# Patient Record
Sex: MALE | Race: WHITE | NOT HISPANIC OR LATINO | Employment: OTHER | URBAN - METROPOLITAN AREA
[De-identification: names, ages, dates, MRNs, and addresses within clinical notes are randomized per-mention and may not be internally consistent; named-entity substitution may affect disease eponyms.]

---

## 2017-06-27 ENCOUNTER — OFFICE VISIT (OUTPATIENT)
Dept: FAMILY MEDICINE | Facility: OTHER | Age: 82
End: 2017-06-27
Payer: COMMERCIAL

## 2017-06-27 ENCOUNTER — RADIANT APPOINTMENT (OUTPATIENT)
Dept: GENERAL RADIOLOGY | Facility: OTHER | Age: 82
End: 2017-06-27
Attending: PHYSICIAN ASSISTANT
Payer: COMMERCIAL

## 2017-06-27 VITALS
DIASTOLIC BLOOD PRESSURE: 86 MMHG | HEART RATE: 84 BPM | OXYGEN SATURATION: 99 % | BODY MASS INDEX: 26.49 KG/M2 | RESPIRATION RATE: 20 BRPM | TEMPERATURE: 98.2 F | HEIGHT: 67 IN | SYSTOLIC BLOOD PRESSURE: 148 MMHG | WEIGHT: 168.8 LBS

## 2017-06-27 DIAGNOSIS — M54.50 ACUTE BILATERAL LOW BACK PAIN WITHOUT SCIATICA: ICD-10-CM

## 2017-06-27 DIAGNOSIS — M54.50 ACUTE BILATERAL LOW BACK PAIN WITHOUT SCIATICA: Primary | ICD-10-CM

## 2017-06-27 PROCEDURE — 99214 OFFICE O/P EST MOD 30 MIN: CPT | Performed by: PHYSICIAN ASSISTANT

## 2017-06-27 PROCEDURE — 72100 X-RAY EXAM L-S SPINE 2/3 VWS: CPT

## 2017-06-27 RX ORDER — HYDROCODONE BITARTRATE AND ACETAMINOPHEN 5; 325 MG/1; MG/1
1-2 TABLET ORAL EVERY 8 HOURS PRN
Qty: 45 TABLET | Refills: 0 | Status: SHIPPED | OUTPATIENT
Start: 2017-06-27 | End: 2017-07-13

## 2017-06-27 RX ORDER — HYDROCODONE BITARTRATE AND ACETAMINOPHEN 5; 325 MG/1; MG/1
1-2 TABLET ORAL EVERY 8 HOURS PRN
Qty: 45 TABLET | Refills: 0 | Status: SHIPPED | OUTPATIENT
Start: 2017-06-27 | End: 2017-06-27

## 2017-06-27 ASSESSMENT — PAIN SCALES - GENERAL: PAINLEVEL: WORST PAIN (10)

## 2017-06-27 NOTE — NURSING NOTE
"No chief complaint on file.      Initial /86  Pulse 84  Temp 98.2  F (36.8  C) (Oral)  Resp 20  Ht 5' 6.93\" (1.7 m)  Wt 168 lb 12.8 oz (76.6 kg)  SpO2 99%  BMI 26.49 kg/m2 Estimated body mass index is 26.49 kg/(m^2) as calculated from the following:    Height as of this encounter: 5' 6.93\" (1.7 m).    Weight as of this encounter: 168 lb 12.8 oz (76.6 kg).  Medication Reconciliation: complete    "

## 2017-06-27 NOTE — MR AVS SNAPSHOT
After Visit Summary   6/27/2017    Luther Kam    MRN: [id number]           Patient Information     Date Of Birth          1935        Visit Information        Provider Department      6/27/2017 10:00 AM Sarah Hickey PA-C [facility]        Today's Diagnoses     Acute bilateral low back pain without sciatica    -  1      Care Instructions    - Pain medications - 2 per day (may take up to 4 per day)   - Can take over-the-counter Ibuprofen or Naproxen as needed for break through pain      Take Senna or Milk of Magnesia if gets constipated   - Make appointment with neurosurgery   - Daily stretching                      Acute Low Back Pain: Self Care at Home Instructions  Conservative Treatment    Remaining active supports a quicker recovery, keep moving. (ex. Walking, increase time & speed as tolerated).    Bed rest is not recommended. Minimize sitting. Do not remain in one position for extended periods of time.    Maintain routine activity with attention to correct posture; stop aggravating activities.    Over-the-counter pain medications for short term symptom control. (See below)    Muscle relaxants are sometimes helpful for few days, but may cause drowsiness. (See below)    Cold packs or heat based upon your preference.    Most people improve within 2 weeks; most have significant improvement within 4 weeks.    If symptoms worsen or you experience numbness, contact your provider immediately.    Medications for Pain Relief  Recommended over-the-counter non-steroidal anti-inflammatories:     Ibuprofen (Advil, Motrin) 600 mg. three times a day as needed for pain      OR   Naproxen Sodium (Aleve) 220-440 mg. two times per day as needed for pain      Low Back Pain Exercises   Exercises that stretch and strengthen the muscles of your abdomen and spine can help prevent back problems. If your back and abdominal muscles are strong, you can maintain good posture and keep  your spine in its correct position.     If your muscles are tight, take a warm shower or bath before doing the exercises. Exercise on a rug or mat. Stop doing any exercise that causes pain until you have talked with your provider.     These exercises are intended only as suggestions. Ask your provider or physical therapist to help you develop an exercise program. Check with your provider before starting the exercises. Ask your provider how many times a week you need to do the exercises.     Low Back Pain Exercises                                            Cat and camel: Get down on your hands and knees. Let your stomach sag, allowing your back to curve downward. Hold this position for 5 seconds. Then arch your back and hold for 5 seconds. Do 3 sets of 10.       Pelvic tilt: Lie on your back with your knees bent and your feet flat on the floor. Tighten your abdominal muscles and push your lower back into the floor. Hold this position for 5 seconds, then relax. Do 3 sets of 10.       Extension exercise: Lie face down on the floor for 5 minutes. If this hurts too much, lie face down with a pillow under your stomach. This should relieve your leg or back pain. When you can lie on your stomach for 5 minutes without a pillow, then you can continue with the rest of this exercise.     After lying on your stomach for 5 minutes, prop yourself up on your elbows for another 5 minutes. Lie flat again for 1 minute, then press down on your hands and extend your elbows while keeping your hips flat on the floor. Hold for 1 second and lower yourself to the floor. Repeat 10 times. Do 4 sets. Rest for 2 minutes between sets. You should have no pain in your legs when you do this, but it is normal to feel pain in your lower back. Do this several times a day.          Developed by [employer]   Published by [employer].   Last modified: 2009-02-08   Last reviewed: 2008-07-07   This content is reviewed periodically and is subject to change  as new health information becomes available. The information is intended to inform and educate and is not a replacement for medical evaluation, advice, diagnosis or treatment by a healthcare professional.   Adult Health Advisor 2009.1 Index  Adult Health Advisor 2009.1 Credits     2009 [facility] and/or its affiliates. All Rights Reserved.                  Low Back Pain            What is low back pain?   Low back pain is pain and stiffness in the lower back. It is one of the most common reasons people miss work.   How does it occur?   Your lower back is called your lumbar spine. It is made up of 5 bones called lumbar vertebrae. In between the vertebrae are shock absorbers called disks. Back pain can occur from an injury to the vertebrae or when a disk bulges or herniates.   Low back pain is usually caused when a ligament or muscle holding a vertebra in its proper position is strained. Vertebrae are bones that make up the spinal column through which the spinal cord passes. When these muscles or ligaments become weak or strained, the spine loses its stability, resulting in pain.   Low back pain can occur if your job involves lifting and carrying heavy objects, or if you spend a lot of time sitting or standing in one position or bending over. It can be caused by a fall or by unusually strenuous exercise. It can be brought on by the tension and stress that cause headaches in some people. It can even be brought on by violent sneezing or coughing.   People who are overweight may have low back pain because of the added stress on their back.   Back pain may occur when the muscles, joints, bones, and connective tissues of the back become inflamed as a result of an infection or an immune system problem. Arthritic disorders as well as some congenital and degenerative conditions may cause back pain.   Back pain accompanied by loss of bladder or bowel control, trouble moving your legs, or numbness or tingling in your arms or  legs requires immediate medical treatment.   What are the symptoms?   Symptoms include:   pain in the back or legs   stiffness, spasm, or limited motion   The pain may be constant or may happen only in certain positions. It may get worse when you cough, sneeze, bend, twist, or strain during a bowel movement. The pain may be in only one spot or may spread to other areas, most commonly down the buttocks and into the back of the thigh.   A low back strain typically does not produce pain past the knee into the calf or foot. Tingling or numbness in the calf or foot may indicate a herniated disk or pinched nerve.   Be sure to see your healthcare provider if:   You have weakness in your leg, especially if you cannot lift your foot, because this may be a sign of nerve damage.   You have new bowel or bladder problems as well as back pain, which may be a sign of severe injury to your spinal cord.   You have pain that gets worse despite treatment.   How is it diagnosed?   Your healthcare provider will review your medical history and examine you. You may have X-rays, an MRI, CT scan, or a bone scan.   How is it treated?   To treat this condition:   Put an ice pack, gel pack, or package of frozen vegetables, wrapped in a cloth on the area every 3 to 4 hours, for up to 20 minutes at a time for the first 2 or 3 days.   Use a heating pad or hot water bottle. Don't let the heating pad get too hot, and don't fall asleep with it. You could get a burn.   Rest in bed on a firm mattress. Often it helps to lie on your back with your knees raised on a pillow. However, some people prefer to lie on their side with their knees bent. It's best to try to stay active, so try not to rest in bed longer than 1 to 2 days.   Take muscle relaxants as recommended by your healthcare provider.   Take an anti-inflammatory such as ibuprofen, or other medicine as directed by your provider. Nonsteroidal anti-inflammatory medicines (NSAIDs) may cause stomach  bleeding and other problems. These risks increase with age. Read the label and take as directed. Unless recommended by your healthcare provider, do not take for more than 10 days.   Get a back massage by a trained person.   Wear a belt or corset to support your back.   Do the exercises recommended by your provider. Your provider may also prescribe physical therapy.   Talk with a counselor, if your back pain is related to tension caused by emotional problems.   When the pain is gone, ask your healthcare provider about starting an exercise program such as the following:   Exercise moderately every day, using stretching and warm-up exercises suggested by your provider or physical therapist.   Exercise vigorously for about 30 minutes 3 times a week by walking, swimming, using a stationary bicycle, or doing low-impact aerobics.   Exercising regularly will not only help your back, it will also help keep you healthier overall.   How long will the effects last?   The effects of back pain last as long as the cause exists or until your body recovers from the strain, usually a day or two but sometimes weeks.   How can I take care of myself?   In addition to the treatment described above, keep in mind these suggestions:   Practice good posture. Stand with your head up, shoulders straight, chest forward, weight balanced evenly on both feet, and pelvis tucked in.   Lose weight if you are overweight   Keep your core muscles strong. These are your abdominal and back muscles.   Sleep without a pillow under your head.   Pain is the best way to  the pace you should set in increasing your activity and exercise. Minor discomfort, stiffness, soreness, and mild aches need not interfere with activity. However, limit your activities temporarily if:   Your symptoms return.   The pain increases when you are more active.   The pain increases within 24 hours after a new or higher level of activity.   When can I return to my normal  activities?   Everyone recovers from an injury at a different rate. Return to your activities depends on how soon your back recovers, not by how many days or weeks it has been since your injury has occurred. In general, the longer you have symptoms before you start treatment, the longer it will take to get better. The goal is to return to your normal activities as soon as is safely possible. If you return too soon you may worsen your injury.   It is important that you have fully recovered from your low back pain before you return to any strenuous activity. You must be able to have the same range of motion that you had before your injury. You must be able to walk and twist without pain.   What can I do to help prevent low back pain?   You can reduce the strain on your back by doing the following:   Don't push with your arms when you move a heavy object. Turn around and push backwards so the strain is taken by your legs.   Whenever you sit, sit in a straight-backed chair and hold your spine against the back of the chair.   Bend your knees and hips and keep your back straight when you lift a heavy object.   Avoid lifting heavy objects higher than your waist.   Hold packages you carry close to your body, with your arms bent.   Use a footrest for one foot when you stand or sit in one spot for a long time. This keeps your back straight.   Bend your knees when you bend over.   Sit close to the pedals when you drive and use your seat belt and a hard backrest or pillow.   Lie on your side with your knees bent when you sleep or rest. It may help to put a pillow between your knees.   Put a pillow under your knees when you sleep on your back.   Raise the foot of the bed 8 inches to discourage sleeping on your stomach unless you have other problems that require that you keep your head elevated.   To rest your back, hold each of these positions for 5?minutes or longer:   Lie on your back, bend your knees, and put pillows under  your knees.   Lie on your back on the floor with a pillow under your neck. Bend your knees to a 90-degree angle, and put your lower legs and feet on a chair.   Lie on your back, bend your knees, and bring one knee up to your chest and hold it there. Repeat with the other knee, then bring both knees to your chest. When holding your knee to your chest, grab your thigh rather than your lower leg to avoid over flexing your knee.     Published by [employer].  This content is reviewed periodically and is subject to change as new health information becomes available. The information is intended to inform and educate and is not a replacement for medical evaluation, advice, diagnosis or treatment by a healthcare professional.   Developed by Nathalie Marx RN, [place], and [facility].   ? 2010 [facility] and/or its affiliates. All Rights Reserved.           Low Back Pain Exercise          Standing hamstring stretch: Put the heel of one leg on a stool about 15 inches high. Keep your leg straight. Lean forward, bending at the hips until you feel a mild stretch in the back of your thigh. Make sure you do not roll your shoulders or bend at the waist when doing this. You want to stretch your leg, not your lower back. Hold the stretch for 15 to 30 seconds. Repeat with each leg 3 times.   Cat and camel: Get down on your hands and knees. Let your stomach sag, allowing your back to curve downward. Hold this position for 5 seconds. Then arch your back and hold for 5 seconds. Do 3 sets of 10.   Quadruped arm and leg raise: Get down on your hands and knees. Pull in your belly button and tighten your abdominal muscles to stiffen your spine. While keeping your abdominals tight, raise one arm and the opposite leg away from you. Hold this position for 5 seconds. Lower your arm and leg slowly and change sides. Do this 10 times on each side.   Pelvic tilt: Lie on your back with your knees bent and your feet flat on the floor. Tighten your  abdominal muscles and push your lower back into the floor. Hold this position for 5 seconds, then relax. Do 3 sets of 10.   Partial curl: Lie on your back with your knees bent and your feet flat on the floor. Tighten your stomach muscles. Tuck your chin to your chest. With your hands stretched out in front of you, curl your upper body forward until your shoulders clear the floor. Hold this position for 3 seconds. Don't hold your breath. It helps to breathe out as you lift your shoulders up. Relax back to the floor. Repeat 10 times. Build to 3 sets of 10. To challenge yourself, clasp your hands behind your head and keep your elbows out to the side.   Gluteal stretch: Lie on your back with both knees bent. Rest the ankle of one leg over the knee of your other leg. Grasp the thigh of the bottom leg and pull toward your chest. You will feel a stretch along the buttocks and possibly along the outside of your hip. Hold the stretch for 15 to 30 seconds. Repeat 3 times with each leg.   Extension exercise:   0. Lie face down on the floor for 5 minutes. If this hurts too much, lie face down with a pillow under your stomach. This should relieve your leg or back pain. When you can lie on your stomach for 5 minutes without a pillow, you can continue with Part B of this exercise.   0. After lying on your stomach for 5 minutes, prop yourself up on your elbows for another 5 minutes. If you can do this without having more leg or buttock pain, you can start doing part C of this exercise.   0. Lie on your stomach with your hands under your shoulders. Then press down on your hands and extend your elbows while keeping your hips flat on the floor. Hold for 1 second and lower yourself to the floor. Do 3 to 5 sets of 10 repetitions. Rest for 1 minute between sets. You should have no pain in your legs when you do this, but it is normal to feel some pain in your lower back.   Do this exercise several times a day.   Side plank: Lie on your  side with your legs, hips, and shoulders in a straight line. Prop yourself up onto your forearm so your elbow is directly under your shoulder. Lift your hips off the floor and balance on your forearm and the outside of your foot. Try to hold this position for 15 seconds, then slowly lower your hip to the ground. Switch sides and repeat. Work up to holding for 1 minute or longer. This exercise can be made easier by starting with your knees and hips flexed toward your chest.   Published by [employer].  This content is reviewed periodically and is subject to change as new health information becomes available. The information is intended to inform and educate and is not a replacement for medical evaluation, advice, diagnosis or treatment by a healthcare professional.   Written by Makayla Ybarra MS, PT, and Nathalie Joshua PT, [facility], [facility] [place], for [facility]   ? 2010 [facility] and/or its affiliates. All Rights Reserved.         Copyright   [employer] Systems 2011                      Follow-ups after your visit        Additional Services     NEUROSURGERY REFERRAL       Your provider has referred you to: PREFERRED PROVIDERS:  [facility]: [facility] [phone]   [email or web address]    Please be aware that coverage of these services is subject to the terms and limitations of your health insurance plan.  Call member services at your health plan with any benefit or coverage questions.      Please bring the following with you to your appointment:    (1) Any X-Rays, CTs or MRIs which have been performed.  Contact the facility where they were done to arrange for  prior to your scheduled appointment.   (2) List of current medications  (3) This referral request   (4) Any documents/labs given to you for this referral                  Who to contact     If you have questions or need follow up information about today's clinic visit or your schedule please contact  "[facility] directly at [phone].  Normal or non-critical lab and imaging results will be communicated to you by MyChart, letter or phone within 4 business days after the clinic has received the results. If you do not hear from us within 7 days, please contact the clinic through MyChart or phone. If you have a critical or abnormal lab result, we will notify you by phone as soon as possible.  Submit refill requests through [employer] or call your pharmacy and they will forward the refill request to us. Please allow 3 business days for your refill to be completed.          Additional Information About Your Visit        [employer] Translate Information     [employer] lets you send messages to your doctor, view your test results, renew your prescriptions, schedule appointments and more. To sign up, go to [email or web address] . Click on \"Log in\" on the left side of the screen, which will take you to the Welcome page. Then click on \"Sign up Now\" on the right side of the page.     You will be asked to enter the access code listed below, as well as some personal information. Please follow the directions to create your username and password.     Your access code is: [email or web address]  Expires: 2017 10:51 AM     Your access code will  in 90 days. If you need help or a new code, please call your [facility] clinic or [phone].        Care EveryWhere ID     This is your Care EveryWhere ID. This could be used by other organizations to access your [facility] medical records  [id number]        Your Vitals Were     Pulse Temperature Respirations Height Pulse Oximetry BMI (Body Mass Index)    84 98.2  F (36.8  C) (Oral) 20 5' 6.93\" (1.7 m) 99% 26.49 kg/m2       Blood Pressure from Last 3 Encounters:   17 148/86   16 118/68   16 130/80    Weight from Last 3 Encounters:   17 168 lb 12.8 oz (76.6 kg)   16 172 lb 12.8 oz (78.4 kg)   16 175 lb (79.4 kg)              We Performed the Following "     NEUROSURGERY REFERRAL          Today's Medication Changes          These changes are accurate as of: 6/27/17 11:59 PM.  If you have any questions, ask your nurse or doctor.               Start taking these medicines.        Dose/Directions    HYDROcodone-acetaminophen 5-325 MG per tablet   Commonly known as:  NORCO   Used for:  Acute bilateral low back pain without sciatica   Started by:  Sarah Hickey PA-C        Dose:  1-2 tablet   Take 1-2 tablets by mouth every 8 hours as needed for moderate to severe pain (Max 4 per day)   Quantity:  45 tablet   Refills:  0            Where to get your medicines      Some of these will need a paper prescription and others can be bought over the counter.  Ask your nurse if you have questions.     Bring a paper prescription for each of these medications     HYDROcodone-acetaminophen 5-325 MG per tablet                Primary Care Provider Office Phone # Fax #    Osmar Kelby Natarajan PA-C [phone]       [address]  [facility] [address]        Equal Access to Services     DANII LEE : Hadii david ku hadasho Soomaali, waaxda luqadaha, qaybta kaalmada adeegyada, waxay maurilioin hayvalerie freeman . So [facility] [phone].    ATENCIÓN: Si habla español, tiene a coyne disposición servicios gratuitos de asistencia lingüística. [facility] [phone].    We comply with applicable federal civil rights laws and [place] laws. We do not discriminate on the basis of race, color, national origin, age, disability sex, sexual orientation or gender identity.            Thank you!     Thank you for choosing [facility]  for your care. Our goal is always to provide you with excellent care. Hearing back from our patients is one way we can continue to improve our services. Please take a few minutes to complete the written survey that you may receive in the mail after your visit with us. Thank you!              Your Updated Medication List - Protect others around you: Learn how to safely use, store and throw away your medicines at www.disposemymeds.org.          This list is accurate as of: 6/27/17 11:59 PM.  Always use your most recent med list.                   Brand Name Dispense Instructions for use Diagnosis    HYDROcodone-acetaminophen 5-325 MG per tablet    NORCO    45 tablet    Take 1-2 tablets by mouth every 8 hours as needed for moderate to severe pain (Max 4 per day)    Acute bilateral low back pain without sciatica       metroNIDAZOLE 0.75 % topical gel    METROGEL     Apply topically twice per day as needed

## 2017-06-27 NOTE — PATIENT INSTRUCTIONS
- Pain medications - 2 per day (may take up to 4 per day)   - Can take over-the-counter Ibuprofen or Naproxen as needed for break through pain      Take Senna or Milk of Magnesia if gets constipated   - Make appointment with neurosurgery   - Daily stretching                      Acute Low Back Pain: Self Care at Home Instructions  Conservative Treatment    Remaining active supports a quicker recovery, keep moving. (ex. Walking, increase time & speed as tolerated).    Bed rest is not recommended. Minimize sitting. Do not remain in one position for extended periods of time.    Maintain routine activity with attention to correct posture; stop aggravating activities.    Over-the-counter pain medications for short term symptom control. (See below)    Muscle relaxants are sometimes helpful for few days, but may cause drowsiness. (See below)    Cold packs or heat based upon your preference.    Most people improve within 2 weeks; most have significant improvement within 4 weeks.    If symptoms worsen or you experience numbness, contact your provider immediately.    Medications for Pain Relief  Recommended over-the-counter non-steroidal anti-inflammatories:     Ibuprofen (Advil, Motrin) 600 mg. three times a day as needed for pain      OR   Naproxen Sodium (Aleve) 220-440 mg. two times per day as needed for pain      Low Back Pain Exercises   Exercises that stretch and strengthen the muscles of your abdomen and spine can help prevent back problems. If your back and abdominal muscles are strong, you can maintain good posture and keep your spine in its correct position.     If your muscles are tight, take a warm shower or bath before doing the exercises. Exercise on a rug or mat. Stop doing any exercise that causes pain until you have talked with your provider.     These exercises are intended only as suggestions. Ask your provider or physical therapist to help you develop an exercise program. Check with your provider before  starting the exercises. Ask your provider how many times a week you need to do the exercises.     Low Back Pain Exercises                                            Cat and camel: Get down on your hands and knees. Let your stomach sag, allowing your back to curve downward. Hold this position for 5 seconds. Then arch your back and hold for 5 seconds. Do 3 sets of 10.       Pelvic tilt: Lie on your back with your knees bent and your feet flat on the floor. Tighten your abdominal muscles and push your lower back into the floor. Hold this position for 5 seconds, then relax. Do 3 sets of 10.       Extension exercise: Lie face down on the floor for 5 minutes. If this hurts too much, lie face down with a pillow under your stomach. This should relieve your leg or back pain. When you can lie on your stomach for 5 minutes without a pillow, then you can continue with the rest of this exercise.     After lying on your stomach for 5 minutes, prop yourself up on your elbows for another 5 minutes. Lie flat again for 1 minute, then press down on your hands and extend your elbows while keeping your hips flat on the floor. Hold for 1 second and lower yourself to the floor. Repeat 10 times. Do 4 sets. Rest for 2 minutes between sets. You should have no pain in your legs when you do this, but it is normal to feel pain in your lower back. Do this several times a day.          Developed by [employer]   Published by [employer].   Last modified: 2009-02-08   Last reviewed: 2008-07-07   This content is reviewed periodically and is subject to change as new health information becomes available. The information is intended to inform and educate and is not a replacement for medical evaluation, advice, diagnosis or treatment by a healthcare professional.   Adult Health Advisor 2009.1 Index  Adult Health Advisor 2009.1 Credits     2009 [facility] and/or its affiliates. All Rights Reserved.                  Low Back Pain            What is low  back pain?   Low back pain is pain and stiffness in the lower back. It is one of the most common reasons people miss work.   How does it occur?   Your lower back is called your lumbar spine. It is made up of 5 bones called lumbar vertebrae. In between the vertebrae are shock absorbers called disks. Back pain can occur from an injury to the vertebrae or when a disk bulges or herniates.   Low back pain is usually caused when a ligament or muscle holding a vertebra in its proper position is strained. Vertebrae are bones that make up the spinal column through which the spinal cord passes. When these muscles or ligaments become weak or strained, the spine loses its stability, resulting in pain.   Low back pain can occur if your job involves lifting and carrying heavy objects, or if you spend a lot of time sitting or standing in one position or bending over. It can be caused by a fall or by unusually strenuous exercise. It can be brought on by the tension and stress that cause headaches in some people. It can even be brought on by violent sneezing or coughing.   People who are overweight may have low back pain because of the added stress on their back.   Back pain may occur when the muscles, joints, bones, and connective tissues of the back become inflamed as a result of an infection or an immune system problem. Arthritic disorders as well as some congenital and degenerative conditions may cause back pain.   Back pain accompanied by loss of bladder or bowel control, trouble moving your legs, or numbness or tingling in your arms or legs requires immediate medical treatment.   What are the symptoms?   Symptoms include:   pain in the back or legs   stiffness, spasm, or limited motion   The pain may be constant or may happen only in certain positions. It may get worse when you cough, sneeze, bend, twist, or strain during a bowel movement. The pain may be in only one spot or may spread to other areas, most commonly down the  buttocks and into the back of the thigh.   A low back strain typically does not produce pain past the knee into the calf or foot. Tingling or numbness in the calf or foot may indicate a herniated disk or pinched nerve.   Be sure to see your healthcare provider if:   You have weakness in your leg, especially if you cannot lift your foot, because this may be a sign of nerve damage.   You have new bowel or bladder problems as well as back pain, which may be a sign of severe injury to your spinal cord.   You have pain that gets worse despite treatment.   How is it diagnosed?   Your healthcare provider will review your medical history and examine you. You may have X-rays, an MRI, CT scan, or a bone scan.   How is it treated?   To treat this condition:   Put an ice pack, gel pack, or package of frozen vegetables, wrapped in a cloth on the area every 3 to 4 hours, for up to 20 minutes at a time for the first 2 or 3 days.   Use a heating pad or hot water bottle. Don't let the heating pad get too hot, and don't fall asleep with it. You could get a burn.   Rest in bed on a firm mattress. Often it helps to lie on your back with your knees raised on a pillow. However, some people prefer to lie on their side with their knees bent. It's best to try to stay active, so try not to rest in bed longer than 1 to 2 days.   Take muscle relaxants as recommended by your healthcare provider.   Take an anti-inflammatory such as ibuprofen, or other medicine as directed by your provider. Nonsteroidal anti-inflammatory medicines (NSAIDs) may cause stomach bleeding and other problems. These risks increase with age. Read the label and take as directed. Unless recommended by your healthcare provider, do not take for more than 10 days.   Get a back massage by a trained person.   Wear a belt or corset to support your back.   Do the exercises recommended by your provider. Your provider may also prescribe physical therapy.   Talk with a counselor, if  your back pain is related to tension caused by emotional problems.   When the pain is gone, ask your healthcare provider about starting an exercise program such as the following:   Exercise moderately every day, using stretching and warm-up exercises suggested by your provider or physical therapist.   Exercise vigorously for about 30 minutes 3 times a week by walking, swimming, using a stationary bicycle, or doing low-impact aerobics.   Exercising regularly will not only help your back, it will also help keep you healthier overall.   How long will the effects last?   The effects of back pain last as long as the cause exists or until your body recovers from the strain, usually a day or two but sometimes weeks.   How can I take care of myself?   In addition to the treatment described above, keep in mind these suggestions:   Practice good posture. Stand with your head up, shoulders straight, chest forward, weight balanced evenly on both feet, and pelvis tucked in.   Lose weight if you are overweight   Keep your core muscles strong. These are your abdominal and back muscles.   Sleep without a pillow under your head.   Pain is the best way to  the pace you should set in increasing your activity and exercise. Minor discomfort, stiffness, soreness, and mild aches need not interfere with activity. However, limit your activities temporarily if:   Your symptoms return.   The pain increases when you are more active.   The pain increases within 24 hours after a new or higher level of activity.   When can I return to my normal activities?   Everyone recovers from an injury at a different rate. Return to your activities depends on how soon your back recovers, not by how many days or weeks it has been since your injury has occurred. In general, the longer you have symptoms before you start treatment, the longer it will take to get better. The goal is to return to your normal activities as soon as is safely possible. If you  return too soon you may worsen your injury.   It is important that you have fully recovered from your low back pain before you return to any strenuous activity. You must be able to have the same range of motion that you had before your injury. You must be able to walk and twist without pain.   What can I do to help prevent low back pain?   You can reduce the strain on your back by doing the following:   Don't push with your arms when you move a heavy object. Turn around and push backwards so the strain is taken by your legs.   Whenever you sit, sit in a straight-backed chair and hold your spine against the back of the chair.   Bend your knees and hips and keep your back straight when you lift a heavy object.   Avoid lifting heavy objects higher than your waist.   Hold packages you carry close to your body, with your arms bent.   Use a footrest for one foot when you stand or sit in one spot for a long time. This keeps your back straight.   Bend your knees when you bend over.   Sit close to the pedals when you drive and use your seat belt and a hard backrest or pillow.   Lie on your side with your knees bent when you sleep or rest. It may help to put a pillow between your knees.   Put a pillow under your knees when you sleep on your back.   Raise the foot of the bed 8 inches to discourage sleeping on your stomach unless you have other problems that require that you keep your head elevated.   To rest your back, hold each of these positions for 5?minutes or longer:   Lie on your back, bend your knees, and put pillows under your knees.   Lie on your back on the floor with a pillow under your neck. Bend your knees to a 90-degree angle, and put your lower legs and feet on a chair.   Lie on your back, bend your knees, and bring one knee up to your chest and hold it there. Repeat with the other knee, then bring both knees to your chest. When holding your knee to your chest, grab your thigh rather than your lower leg to avoid  over flexing your knee.     Published by [employer].  This content is reviewed periodically and is subject to change as new health information becomes available. The information is intended to inform and educate and is not a replacement for medical evaluation, advice, diagnosis or treatment by a healthcare professional.   Developed by Nathalie Marx RN, [place], and [facility].   ? 2010 [facility] and/or its affiliates. All Rights Reserved.           Low Back Pain Exercise          Standing hamstring stretch: Put the heel of one leg on a stool about 15 inches high. Keep your leg straight. Lean forward, bending at the hips until you feel a mild stretch in the back of your thigh. Make sure you do not roll your shoulders or bend at the waist when doing this. You want to stretch your leg, not your lower back. Hold the stretch for 15 to 30 seconds. Repeat with each leg 3 times.   Cat and camel: Get down on your hands and knees. Let your stomach sag, allowing your back to curve downward. Hold this position for 5 seconds. Then arch your back and hold for 5 seconds. Do 3 sets of 10.   Quadruped arm and leg raise: Get down on your hands and knees. Pull in your belly button and tighten your abdominal muscles to stiffen your spine. While keeping your abdominals tight, raise one arm and the opposite leg away from you. Hold this position for 5 seconds. Lower your arm and leg slowly and change sides. Do this 10 times on each side.   Pelvic tilt: Lie on your back with your knees bent and your feet flat on the floor. Tighten your abdominal muscles and push your lower back into the floor. Hold this position for 5 seconds, then relax. Do 3 sets of 10.   Partial curl: Lie on your back with your knees bent and your feet flat on the floor. Tighten your stomach muscles. Tuck your chin to your chest. With your hands stretched out in front of you, curl your upper body forward until your shoulders clear the floor. Hold this position for 3  seconds. Don't hold your breath. It helps to breathe out as you lift your shoulders up. Relax back to the floor. Repeat 10 times. Build to 3 sets of 10. To challenge yourself, clasp your hands behind your head and keep your elbows out to the side.   Gluteal stretch: Lie on your back with both knees bent. Rest the ankle of one leg over the knee of your other leg. Grasp the thigh of the bottom leg and pull toward your chest. You will feel a stretch along the buttocks and possibly along the outside of your hip. Hold the stretch for 15 to 30 seconds. Repeat 3 times with each leg.   Extension exercise:   0. Lie face down on the floor for 5 minutes. If this hurts too much, lie face down with a pillow under your stomach. This should relieve your leg or back pain. When you can lie on your stomach for 5 minutes without a pillow, you can continue with Part B of this exercise.   0. After lying on your stomach for 5 minutes, prop yourself up on your elbows for another 5 minutes. If you can do this without having more leg or buttock pain, you can start doing part C of this exercise.   0. Lie on your stomach with your hands under your shoulders. Then press down on your hands and extend your elbows while keeping your hips flat on the floor. Hold for 1 second and lower yourself to the floor. Do 3 to 5 sets of 10 repetitions. Rest for 1 minute between sets. You should have no pain in your legs when you do this, but it is normal to feel some pain in your lower back.   Do this exercise several times a day.   Side plank: Lie on your side with your legs, hips, and shoulders in a straight line. Prop yourself up onto your forearm so your elbow is directly under your shoulder. Lift your hips off the floor and balance on your forearm and the outside of your foot. Try to hold this position for 15 seconds, then slowly lower your hip to the ground. Switch sides and repeat. Work up to holding for 1 minute or longer. This exercise can be made  easier by starting with your knees and hips flexed toward your chest.   Published by [employer].  This content is reviewed periodically and is subject to change as new health information becomes available. The information is intended to inform and educate and is not a replacement for medical evaluation, advice, diagnosis or treatment by a healthcare professional.   Written by Makayla Ybarra, MS, PT, and Nathalie Joshua PT, [facility], [facility], for [facility]   ? 2010 [facility] and/or its affiliates. All Rights Reserved.         Copyright   [employer] 2011

## 2017-06-27 NOTE — PROGRESS NOTES
SUBJECTIVE:                                                    Luther Kam is a 82 year old male who presents to clinic today for the following health issues:    Back Pain       Duration: June         Specific cause: none    Description:   Location of pain: low back both  Character of pain: sharp  Pain radiation:none  New numbness or weakness in legs, not attributed to pain:  no     Intensity: Currently 6/10, At its worst 10/10    History:   Pain interferes with job: Not applicable  History of back problems: Yes 1980 was a work comp case   Any previous MRI or X-rays: MRI but unsure where it was, pt was told he had a herniated disc    Sees a specialist for back pain:  Yes unsure   Therapies tried without relief: none     Alleviating factors:   Improved by: none       Precipitating factors:  Worsened by: Lifting, Bending and twisting, leaning     Functional and Psychosocial Screen (Aung STarT Back):      Not performed today      Accompanying Signs & Symptoms:  Risk of Fracture:  Age >64  Risk of Cauda Equina:  None  Risk of Infection:  None  Risk of Cancer:  None  Risk of Ankylosing Spondylitis:  Onset at age <35, male, AND morning back stiffness. no     - PT states when he is in a lot of pain when trying to get out of bed, 10/10 almost can't do it   - History of back problems in the 80's and 90's   - Told back then herniated disc   - Got some pain pills in the ED   - Only took 1 per day, would take at noon, did really help just didn't last long       Problem list and histories reviewed & adjusted, as indicated.  Additional history: as documented    Labs reviewed in EPIC    Reviewed and updated as needed this visit by clinical staff  Tobacco  Allergies  Meds  Problems  Med Hx  Surg Hx  Fam Hx  Soc Hx        Reviewed and updated as needed this visit by Provider  Allergies  Meds  Problems         ROS:  Constitutional, HEENT, cardiovascular, pulmonary, gi and gu systems are negative, except as otherwise  "noted.    OBJECTIVE:   /86  Pulse 84  Temp 98.2  F (36.8  C) (Oral)  Resp 20  Ht 5' 6.93\" (1.7 m)  Wt 168 lb 12.8 oz (76.6 kg)  SpO2 99%  BMI 26.49 kg/m2  Body mass index is 26.49 kg/(m^2).  GENERAL APPEARANCE: healthy, alert and no distress  EYES: Eyes grossly normal to inspection, PERRLA, conjunctivae and sclerae without injection or discharge, EOM intact   MS: No musculoskeletal defects are noted and gait is age appropriate without ataxia   SKIN: No suspicious lesions or rashes, hydration status appears adeuqate with normal skin turgor   NEURO: Strength 5+ bilateral lower extremities, sensation intact in distal bilateral lower extremities, mentation- intact, speech- normal, reflexes- symmetric in bilateral lower extremities  BACK: No CVA tenderness, bilateral upper paralumbar tenderness, upper lumbar midline tenderness, decreased ROM with all movements    PSYCH: Alert and oriented x3; speech- coherent , normal rate and volume; able to articulate logical thoughts, able to abstract reason, no tangential thoughts, no hallucinations or delusions, mentation appears normal, Mood is euthymic. Affect is appropriate for this mood state and bright. Thought content is free of suicidal ideation, hallucinations, and delusions. Dress is adequate and upkept. Eye contact is good during conversation.       Diagnostic Test Results:  XR lumbar spine: Preliminary reading - normal bone structure with no evidence of fracture. Final pending review by radiology.       ASSESSMENT/PLAN:       ICD-10-CM    1. Acute bilateral low back pain without sciatica M54.5 XR Lumbar Spine 2/3 Views     NEUROSURGERY REFERRAL     HYDROcodone-acetaminophen (NORCO) 5-325 MG per tablet     - Concern for pathology due to pain   - Recommend more aggressive evaluation and treatment than conservative therapy   - Pain medications - 2 per day (may take up to 4 per day)      Discussed use and side effects including sedation and addiction      Watch " for sedation and falls that could happen if too sedated   - Can take over-the-counter Ibuprofen or Naproxen as needed for break through pain      Take Senna or Milk of Magnesia if gets constipated, watch for this carefully   - Make appointment with neurosurgery   - Daily stretching     Hand out given    The patient indicates understanding of these issues and agrees with the plan.    Follow up: with specialist       Sarah Hickey PA-C  [facility]

## 2017-06-28 ENCOUNTER — TELEPHONE (OUTPATIENT)
Dept: FAMILY MEDICINE | Facility: OTHER | Age: 82
End: 2017-06-28

## 2017-06-28 NOTE — TELEPHONE ENCOUNTER
Patient stopped by with his After visit summary, he noticed it said something about his wrist on there and he never talked about that.( it was under his diagnosis) Can you please take that off?   He would like to stop by tomorrow and get a new one without anything about his wrist being on there.

## 2017-07-13 ENCOUNTER — OFFICE VISIT (OUTPATIENT)
Dept: NEUROSURGERY | Facility: OTHER | Age: 82
End: 2017-07-13
Payer: COMMERCIAL

## 2017-07-13 VITALS — WEIGHT: 165 LBS | HEIGHT: 67 IN | BODY MASS INDEX: 25.9 KG/M2 | TEMPERATURE: 97.6 F

## 2017-07-13 DIAGNOSIS — M54.50 ACUTE BILATERAL LOW BACK PAIN WITHOUT SCIATICA: ICD-10-CM

## 2017-07-13 DIAGNOSIS — M54.50 CHRONIC BILATERAL LOW BACK PAIN WITHOUT SCIATICA: Primary | ICD-10-CM

## 2017-07-13 DIAGNOSIS — G89.29 CHRONIC BILATERAL LOW BACK PAIN WITHOUT SCIATICA: Primary | ICD-10-CM

## 2017-07-13 PROCEDURE — 99203 OFFICE O/P NEW LOW 30 MIN: CPT | Performed by: PHYSICIAN ASSISTANT

## 2017-07-13 RX ORDER — HYDROCODONE BITARTRATE AND ACETAMINOPHEN 5; 325 MG/1; MG/1
1-2 TABLET ORAL EVERY 8 HOURS PRN
Qty: 45 TABLET | Refills: 0 | Status: SHIPPED | OUTPATIENT
Start: 2017-07-13 | End: 2017-08-08

## 2017-07-13 NOTE — TELEPHONE ENCOUNTER
Reason for Call:  Medication or medication refill:    Do you use a [facility]?  Name of the pharmacy and phone number for the current request:  [facility] - [phone]    Name of the medication requested: HYDROcodone-acetaminophen (NORCO) 5-325 MG per tablet    Other request:  Pt states needs refill of medication please advise and contact pt with questions or once rx is ready. Pt states voicemail isnt activated but will stop in on Monday to check on refill    Can we leave a detailed message on this number? YES    Phone number patient can be reached at: Home number on file [phone] (home)    Best Time: ANY    Call taken on 7/13/2017 at 10:23 AM by Elizabeth Henao

## 2017-07-13 NOTE — TELEPHONE ENCOUNTER
Medication(s) reviewed and approved. Rx. was signed and placed in MA task.       Please notify that this has been done.      Please close the encounter when finished with it.     Sarah Hickey PA-C

## 2017-07-13 NOTE — TELEPHONE ENCOUNTER
Tried to contact patient regarding message below. No answer/no VM- will try later.  Prescription brought to the .  Nayeli Kline CMA ([facility])

## 2017-07-13 NOTE — PROGRESS NOTES
"Dr. Shiv Flores  [facility]  Neurosurgery Clinic Visit      CC: Low back pain    Primary care Provider: Osmar Natarajan      Reason For Visit:   I was asked by Sarah Hickey PA-C to consult on the patient for low back pain.      HPI: Luther Kam is a 82 year old male who presents for evaluation of low back pain x 1 month with no specific injury. Pain is located in bilateral low back R>L. Describes the pain as intermittent and sharp. He has a remote history of a back injury where he was told he had a \"slipped disc\". Pain worsens with getting out of bed. Cannot recall anything that causes the pain to decrease. He has been using a heating pad which works for a short period. No physical therapy, injections, or previous back surgeries. Denies bladder or bowel incontinence.  Current pain: 2/10 At worst: 10/10    Past Medical History:   Diagnosis Date     Irritable bowel syndrome      Rosacea        Past Medical History reviewed with patient during visit.    Past Surgical History:   Procedure Laterality Date     HC HEMORRHOIDECT EXTERNAL, COMPLETE  1962     HC REPAIR ING HERNIA,5+Y/O,REDUCIBL  2007    right     Past Surgical History reviewed with patient during visit.    Current Outpatient Prescriptions   Medication     HYDROcodone-acetaminophen (NORCO) 5-325 MG per tablet     metroNIDAZOLE (METROGEL) 0.75 % gel     No current facility-administered medications for this visit.        No Known Allergies    Social History     Social History     Marital status:      Spouse name: N/A     Number of children: N/A     Years of education: N/A     Social History Main Topics     Smoking status: Former Smoker     Packs/day: 0.50     Types: Cigarettes     Smokeless tobacco: Never Used      Comment: socially, not daily     Alcohol use Yes      Comment: 3-4 day to rare     Drug use: No     Sexual activity: Not Currently     Partners: Female      Comment: no bc     Other Topics Concern     " Not on file     Social History Narrative       Family History   Problem Relation Age of Onset     Hypertension Mother           ROS: 10 point ROS neg other than the symptoms noted above in the HPI.    Vital Signs: There were no vitals taken for this visit.    Examination:  Constitutional:  Alert, well nourished, NAD.  HEENT: Normocephalic, atraumatic.   Pulmonary:  Without shortness of breath, normal effort.   Lymph: no lymphadenopathy to low back or LE.   Integumentary: Skin is free of rashes or lesions.   Cardiovascular:  No pitting edema of BLE.      Neurological:  Awake  Alert  Oriented x 3  Speech clear  Cranial nerves II - XII grossly intact  PERRL  EOMI  Face symmetric  Tongue midline  Motor exam   Hip Flexor:                Right: 5/5  Left:  5/5  Hip Adductor:             Right:  5/5  Left:  5/5  Hip Abductor:             Right:  5/5  Left:  5/5  Gastroc Soleus:        Right:  5/5  Left:  5/5  Tib/Ant:                      Right:  5/5  Left:  5/5  EHL:                          Right:  5/5  Left:  5/5       Sensation normal to bilateral upper and lower extremities.    Reflexes are 2+ in the patellar and Achilles. There is no clonus. Downgoing Babinski.  Musculoskeletal:  Gait: Able to stand from a seated position. Ambulates with a cane.  Able to heel/toe walk without loss of balance  Lumbar examination reveals no tenderness of the spine. Tenderness noted to right paraspinous muscles.  Hip height is symmetrical. Negative SI joint, sciatic notch or greater trochanteric tenderness to palpation bilaterally.  Straight leg raise is negative bilaterally.      Imaging:   XR LUMBAR SPINE 2-3 VIEWS 6/27/2017 10:21 AM     HISTORY: Pain.     COMPARISON: None.      IMPRESSION: There is height loss of the T12 and L1 vertebral bodies, suggestive of old compression injuries. Grade 1 retrolisthesis of L2 on L3. Mild to moderate degenerative changes with anterior osteophytes.     BAILEY BECERRA MD    Assessment/Plan:   Luther  Eddy is a 82 year old male who presents for evaluation of low back pain x 1 month with no specific injury. Pain is located in bilateral low back R>L. Describes the pain as intermittent and sharp. I have reviewed xray with patient and discussed that obtaining an MRI would be beneficial, which patient is in agreement with. I will have him follow-up in clinic after his MRI is completed. Patient voiced understanding and agreement.          Yuni Lancaster PA-C  [facility]  [address] [address]    Tel [phone]  Pager [phone]

## 2017-07-13 NOTE — MR AVS SNAPSHOT
"              After Visit Summary   7/13/2017    Luther Kam    MRN: [id number]           Patient Information     Date Of Birth          1935        Visit Information        Provider Department      7/13/2017 10:05 AM Yuni Lancaster PA-C [facility]        Today's Diagnoses     Chronic bilateral low back pain without sciatica    -  1      Care Instructions    Lumbar MRI ordered - I will call you with the results    Call [phone] to schedule          Follow-ups after your visit        Future tests that were ordered for you today     Open Future Orders        Priority Expected Expires Ordered    MR Lumbar Spine w/o Contrast Routine  7/13/2018 7/13/2017            Who to contact     If you have questions or need follow up information about today's clinic visit or your schedule please contact [facility] directly at [phone].  Normal or non-critical lab and imaging results will be communicated to you by [email or web address], letter or phone within 4 business days after the clinic has received the results. If you do not hear from us within 7 days, please contact the clinic through MyChart or phone. If you have a critical or abnormal lab result, we will notify you by phone as soon as possible.  Submit refill requests through [employer] or call your pharmacy and they will forward the refill request to us. Please allow 3 business days for your refill to be completed.          Additional Information About Your Visit        MyChart Information     [employer] lets you send messages to your doctor, view your test results, renew your prescriptions, schedule appointments and more. To sign up, go to [email or web address] . Click on \"Log in\" on the left side of the screen, which will take you to the Welcome page. Then click on \"Sign up Now\" on the right side of the page.     You will be asked to enter the access code listed below, as well as some personal information. Please follow the directions to " "create your username and password.     Your access code is: [email or web address]  Expires: 2017 10:51 AM     Your access code will  in 90 days. If you need help or a new code, please call your [facility] clinic or [phone].        Care EveryWhere ID     This is your Care EveryWhere ID. This could be used by other organizations to access your [facility] medical records  [id number]        Your Vitals Were     Temperature Height BMI (Body Mass Index)             97.6  F (36.4  C) (Temporal) 5' 6.93\" (1.7 m) 25.9 kg/m2          Blood Pressure from Last 3 Encounters:   17 148/86   16 118/68   16 130/80    Weight from Last 3 Encounters:   17 165 lb (74.8 kg)   17 168 lb 12.8 oz (76.6 kg)   16 172 lb 12.8 oz (78.4 kg)               Primary Care Provider Office Phone # Fax #    Osmar Natarajan PA-C [phone]       [facility] [address]  [facility] [address]        Equal Access to Services     DANII LEE AH: Hadii david capone hadasho Soomaali, waaxda luqadaha, qaybta kaalmada adeegyada, elke freeman . So [facility] [phone].    ATENCIÓN: Si habla español, tiene a coyne disposición servicios gratuitos de asistencia lingüística. Llame al [phone].    We comply with applicable federal civil rights laws and [place] laws. We do not discriminate on the basis of race, color, national origin, age, disability sex, sexual orientation or gender identity.            Thank you!     Thank you for choosing [facility]  for your care. Our goal is always to provide you with excellent care. Hearing back from our patients is one way we can continue to improve our services. Please take a few minutes to complete the written survey that you may receive in the mail after your visit with us. Thank you!             Your Updated Medication List - Protect others around you: Learn how to safely use, store and throw away your " medicines at www.disposemymeds.org.          This list is accurate as of: 7/13/17 10:18 AM.  Always use your most recent med list.                   Brand Name Dispense Instructions for use Diagnosis    HYDROcodone-acetaminophen 5-325 MG per tablet    NORCO    45 tablet    Take 1-2 tablets by mouth every 8 hours as needed for moderate to severe pain (Max 4 per day)    Acute bilateral low back pain without sciatica       metroNIDAZOLE 0.75 % topical gel    METROGEL     Apply topically twice per day as needed

## 2017-07-13 NOTE — NURSING NOTE
"Luther Kam is a 82 year old male who presents for:  Chief Complaint   Patient presents with     Consult     low back pain     Neurologic Problem        Initial Vitals:  Temp 97.6  F (36.4  C) (Temporal)  Ht 5' 6.93\" (1.7 m)  Wt 165 lb (74.8 kg)  BMI 25.9 kg/m2 Estimated body mass index is 25.9 kg/(m^2) as calculated from the following:    Height as of this encounter: 5' 6.93\" (1.7 m).    Weight as of this encounter: 165 lb (74.8 kg).. Body surface area is 1.88 meters squared. BP completed using cuff size: NA (Not Taken)  Data Unavailable    Do you feel safe in your environment?  Yes  Do you need any refills today? No    Nursing Comments:         Mukund Marquez    "

## 2017-07-13 NOTE — TELEPHONE ENCOUNTER
Norco       Last Written Prescription Date: 06/27/17  Last Fill Quantity: 45,  # refills: 0   Last Office Visit with G, P or [facility] prescribing provider: 06/27/17                                             Angelica Marinelli MA  July 13, 2017

## 2017-07-20 ENCOUNTER — HOSPITAL ENCOUNTER (OUTPATIENT)
Dept: MRI IMAGING | Facility: CLINIC | Age: 82
Discharge: HOME | End: 2017-07-20
Attending: PHYSICIAN ASSISTANT | Admitting: PHYSICIAN ASSISTANT
Payer: MEDICARE

## 2017-07-20 DIAGNOSIS — M54.50 CHRONIC BILATERAL LOW BACK PAIN WITHOUT SCIATICA: ICD-10-CM

## 2017-07-20 DIAGNOSIS — G89.29 CHRONIC BILATERAL LOW BACK PAIN WITHOUT SCIATICA: ICD-10-CM

## 2017-07-20 PROCEDURE — 72148 MRI LUMBAR SPINE W/O DYE: CPT

## 2017-07-21 ENCOUNTER — TELEPHONE (OUTPATIENT)
Dept: NEUROSURGERY | Facility: CLINIC | Age: 82
End: 2017-07-21

## 2017-07-21 DIAGNOSIS — S32.000A COMPRESSION FRACTURE OF LUMBAR VERTEBRA, CLOSED, INITIAL ENCOUNTER (H): Primary | ICD-10-CM

## 2017-07-21 NOTE — TELEPHONE ENCOUNTER
Called patient but does not have VM set up. MRI reveals acute/subacute T12 and L1 compression fractures. Will order José brace and have f/u in 6 weeks with lumbar xray prior.

## 2017-08-07 NOTE — PROGRESS NOTES
SUBJECTIVE:                                                    Luther Kam is a 82 year old male who presents to clinic today for the following health issues:      HPI    Pt would also like to discuss swelling in feet, left foot worse than right  - Just noticed it about a week ago  - No other sx's just the swelling  - Did have this in the past, was told due to twisted spine, walking kept under control  - Always left foot worse   - 1+ pitting left, barely any on right   - Doesn't hurt         Back Pain       Duration: ongoing - since early june        Specific cause: none    Description:   Location of pain: low back middle  Character of pain: sharp, dull ache and stabbing  Pain radiation:radiates into the right buttocks, radiates into the right leg, radiates into the left buttocks and radiates into the left leg  New numbness or weakness in legs, not attributed to pain:  no     Intensity: Currently 1/10    History:   Pain interferes with job: Not applicable  History of back problems: no prior back problems  Any previous MRI or X-rays: Yes-  Sees a specialist for back pain:  No  Therapies tried without relief: none    Alleviating factors:   Improved by: opioids and sitting      Precipitating factors:  Worsened by: Lifting, Bending, Standing and Walking    Functional and Psychosocial Screen (Aung STarT Back):      Not performed today    Accompanying Signs & Symptoms:  Risk of Fracture:  None  Risk of Cauda Equina:  None  Risk of Infection:  None  Risk of Cancer:  None  Risk of Ankylosing Spondylitis:  Onset at age <35, male, AND morning back stiffness. no     - 4 per day Norco was prescribed, avoid as much as possible, <2/day   - Wearing brace   - BP in stores 130/89   - No family history of blood pressure issues   - No drinking in 28 days, prior to was drinking a bit more, 4-6/night 2-3x/week   - Tight socks   - No SHORTNESS OF BREATH, no chest pain, no dyspnea   - Can walk 2 miles without issues before the brace  for back      Plan from last visit on 6/27/17  - Concern for pathology due to pain   - Recommend more aggressive evaluation and treatment than conservative therapy   - Pain medications - 2 per day (may take up to 4 per day)      Discussed use and side effects including sedation and addiction      Watch for sedation and falls that could happen if too sedated   - Can take over-the-counter Ibuprofen or Naproxen as needed for break through pain      Take Senna or Milk of Magnesia if gets constipated, watch for this carefully   - Make appointment with neurosurgery   - Daily stretching     Hand out given    MRI on 7/21/17    Note from neurosurgery  Called patient but does not have VM set up. MRI reveals acute/subacute T12 and L1 compression fractures. Will order José brace and have f/u in 6 weeks with lumbar xray prior.       Problem list and histories reviewed & adjusted, as indicated.  Additional history: as documented    ROS:  Constitutional, HEENT, cardiovascular, pulmonary, gi and gu systems are negative, except as otherwise noted.      OBJECTIVE:   /86  Pulse 91  Temp 98.1  F (36.7  C) (Oral)  Resp 20  Wt 176 lb (79.8 kg)  SpO2 97%  BMI 27.62 kg/m2  Body mass index is 27.62 kg/(m^2).  GENERAL APPEARANCE: healthy, alert and no distress  EYES: Eyes grossly normal to inspection, PERRLA, conjunctivae and sclerae without injection or discharge, EOM intact   RESP: Lungs clear to auscultation - no rales, rhonchi or wheezes   CV: Regular rates and rhythm, normal S1 S2, no S3 or S4, no murmur, click or rub, 1+ pitting edema on  peripheral edema and peripheral pulses strong and symmetric bilaterally   MS: No musculoskeletal defects are noted and gait is age appropriate without ataxia   SKIN: No suspicious lesions or rashes, hydration status appears adeuqate with normal skin turgor   BACK: Deferred - patient in brace   PSYCH: Alert and oriented x3; speech- coherent , normal rate and volume; able to articulate  logical thoughts, able to abstract reason, no tangential thoughts, no hallucinations or delusions, mentation appears normal, Mood is euthymic. Affect is appropriate for this mood state and bright. Thought content is free of suicidal ideation, hallucinations, and delusions. Dress is adequate and upkept. Eye contact is good during conversation.       Diagnostic Test Results:  See orders pending in Epic         ASSESSMENT/PLAN:       ICD-10-CM    1. Acute bilateral low back pain without sciatica M54.5 HYDROcodone-acetaminophen (NORCO) 5-325 MG per tablet   2. T12 compression fracture, with routine healing, subsequent encounter S22.080D    3. Compression fracture of L1 lumbar vertebra, with routine healing, subsequent encounter S32.010D    4. Alcohol abuse, episodic drinking behavior F10.10 Comprehensive metabolic panel   5. CARDIOVASCULAR SCREENING; LDL GOAL LESS THAN 160 Z13.6 **Lipid panel reflex to direct LDL FUTURE 2mo     Comprehensive metabolic panel   6. Screening for prostate cancer Z12.5 PSA, screen   7. Edema, unspecified type R60.9 TSH with free T4 reflex     CBC with platelets     1. Back Pain  - Patient continues to follow with neurosurgery recommendations   - Wearing brace as tolerated   - Using 1-2 Norco per day, reviewed use and side effects, ok to take up to 4 if pain severe, does have 2 compression fractures   - Plan to follow up with neurosurgery 6 weeks after he got his brace (took awhile, states only had it a week or two)   - Return to clinic as needed for pain medication refill   -  reviewed - as expected   - Encouraged sobriety, absolutely never should he drink while using his pain medications, discussed severe risks      2. Edema   - Unclear etiology and this time, however no signs of CHF and per chart review and patient report it does seem like hereditary lymphedema has been worked up in the past   - Recommend labs today to rule out other etiology   - Recommend compression socks and  "elevation of legs as tolerated   - If remains greater than 2 weeks or findings on labs, will need him to return to clinic   - Discussed warning signs that would warrant return to clinic     - Patient states \"been a long time\" since he had labs, would like all screenings   - Orders as above   - Results will be communicated to patient when available and appropriate medication changes made if necessary     - BP continues to be elevated, but comes down a bit with rest, patient does have significant pain, labs today will help to rule out reversible etiology, will continue to monitor     The patient indicates understanding of these issues and agrees with the plan.    Follow up: as outlined above          Sarah Hickey PA-C  [facility]  "

## 2017-08-08 ENCOUNTER — OFFICE VISIT (OUTPATIENT)
Dept: FAMILY MEDICINE | Facility: OTHER | Age: 82
End: 2017-08-08
Payer: COMMERCIAL

## 2017-08-08 VITALS
BODY MASS INDEX: 27.62 KG/M2 | WEIGHT: 176 LBS | OXYGEN SATURATION: 97 % | HEART RATE: 91 BPM | RESPIRATION RATE: 20 BRPM | SYSTOLIC BLOOD PRESSURE: 140 MMHG | TEMPERATURE: 98.1 F | DIASTOLIC BLOOD PRESSURE: 86 MMHG

## 2017-08-08 DIAGNOSIS — M54.50 ACUTE BILATERAL LOW BACK PAIN WITHOUT SCIATICA: Primary | ICD-10-CM

## 2017-08-08 DIAGNOSIS — F10.10 ALCOHOL ABUSE, EPISODIC DRINKING BEHAVIOR: ICD-10-CM

## 2017-08-08 DIAGNOSIS — R03.0 ELEVATED BLOOD PRESSURE READING WITHOUT DIAGNOSIS OF HYPERTENSION: ICD-10-CM

## 2017-08-08 DIAGNOSIS — S32.010D COMPRESSION FRACTURE OF L1 LUMBAR VERTEBRA, WITH ROUTINE HEALING, SUBSEQUENT ENCOUNTER: ICD-10-CM

## 2017-08-08 DIAGNOSIS — Z13.6 CARDIOVASCULAR SCREENING; LDL GOAL LESS THAN 160: ICD-10-CM

## 2017-08-08 DIAGNOSIS — R60.9 EDEMA, UNSPECIFIED TYPE: ICD-10-CM

## 2017-08-08 DIAGNOSIS — S22.080D T12 COMPRESSION FRACTURE, WITH ROUTINE HEALING, SUBSEQUENT ENCOUNTER: ICD-10-CM

## 2017-08-08 DIAGNOSIS — Z12.5 SCREENING FOR PROSTATE CANCER: ICD-10-CM

## 2017-08-08 LAB
ERYTHROCYTE [DISTWIDTH] IN BLOOD BY AUTOMATED COUNT: 14 % (ref 10–15)
HCT VFR BLD AUTO: 38.6 % (ref 40–53)
HGB BLD-MCNC: 12.6 G/DL (ref 13.3–17.7)
MCH RBC QN AUTO: 31.3 PG (ref 26.5–33)
MCHC RBC AUTO-ENTMCNC: 32.6 G/DL (ref 31.5–36.5)
MCV RBC AUTO: 96 FL (ref 78–100)
PLATELET # BLD AUTO: 290 10E9/L (ref 150–450)
RBC # BLD AUTO: 4.03 10E12/L (ref 4.4–5.9)
WBC # BLD AUTO: 6.4 10E9/L (ref 4–11)

## 2017-08-08 PROCEDURE — G0103 PSA SCREENING: HCPCS | Performed by: PHYSICIAN ASSISTANT

## 2017-08-08 PROCEDURE — 80053 COMPREHEN METABOLIC PANEL: CPT | Performed by: PHYSICIAN ASSISTANT

## 2017-08-08 PROCEDURE — 84443 ASSAY THYROID STIM HORMONE: CPT | Performed by: PHYSICIAN ASSISTANT

## 2017-08-08 PROCEDURE — 99214 OFFICE O/P EST MOD 30 MIN: CPT | Performed by: PHYSICIAN ASSISTANT

## 2017-08-08 PROCEDURE — 85027 COMPLETE CBC AUTOMATED: CPT | Performed by: PHYSICIAN ASSISTANT

## 2017-08-08 PROCEDURE — 36415 COLL VENOUS BLD VENIPUNCTURE: CPT | Performed by: PHYSICIAN ASSISTANT

## 2017-08-08 RX ORDER — HYDROCODONE BITARTRATE AND ACETAMINOPHEN 5; 325 MG/1; MG/1
1-2 TABLET ORAL EVERY 8 HOURS PRN
Qty: 60 TABLET | Refills: 0 | Status: SHIPPED | OUTPATIENT
Start: 2017-08-08 | End: 2017-10-06

## 2017-08-08 NOTE — MR AVS SNAPSHOT
After Visit Summary   8/8/2017    Luther Kam    MRN: [id number]           Patient Information     Date Of Birth          1935        Visit Information        Provider Department      8/8/2017 1:30 PM Sarah Hickey PA-C [facility]        Today's Diagnoses     Acute bilateral low back pain without sciatica    -  1    T12 compression fracture, with routine healing, subsequent encounter        Compression fracture of L1 lumbar vertebra, with routine healing, subsequent encounter        Alcohol abuse, episodic drinking behavior        CARDIOVASCULAR SCREENING; LDL GOAL LESS THAN 160        Screening for prostate cancer        Edema, unspecified type          Care Instructions    - Return to clinic in 2 weeks if swelling/edema remains           Leg Swelling in Both Legs    Swelling of the feet, ankles, and legs is called edema. It is caused by excess fluid that has collected in the tissues. Extra fluid in the body settles in the lowest part because of gravity. This is why the legs and feet are most affected.  Some of the causes for edema include:    Disease of the heart like congestive heart failure    Standing or sitting for long periods of time    Infection of the feet or legs    Blood pooling in the veins of your legs (venous insufficiency)    Dilated veins in your lower leg (varicose veins)    Garters or other clothing that is tight on your legs. This will cause blood to pool in your legs because the clothing limits blood flow.    Some medicines such as hormones like birth control pills, some blood pressure medicines like calcium channel blockers (amlodipine) and steroids, some antidepressants like MAO inhibitors and tricyclics    Menstrual periods that cause you to retain fluids    Many types of renal disease    Liver failure or cirrhosis    Pregnancy, some swelling is normal, but a sudden increase in leg swelling or weight gain can be a sign of a dangerous  complication of pregnancy    Poor nutrition    Thyroid disease  Medical treatment will depend on what is causing the swelling in your legs. Your healthcare provider may prescribe water pills (diuretics) to get rid of the extra fluid.  Home care  Follow these guidelines when caring for yourself at home:    Don't wear clothing like garters that is tight on your legs.    Keep your legs up while lying or sitting.    If infection, injury, or recent surgery is causing the swelling, stay off your legs as much as possible until symptoms get better.    If your healthcare provider says that your leg swelling is caused by venous insufficiency or varicose veins, don't sit or  one place for long periods of time. Take breaks and walk about every few hours. Brisk walking is a good exercise. It helps circulate the blood that has collected in your leg. Talk with your provider about using support stockings to stop daytime leg swelling.    If your provider says that heart disease is causing your leg swelling, follow a low-salt diet to stop extra fluid from staying in your body. You may also need medicine.  Follow-up care  Follow up with your healthcare provider, or as advised.  When to seek medical advice  Call your healthcare provider right away if any of these occur:    New shortness of breath or chest pain    Shortness of breath or chest pain that gets worse    Swelling in both legs or ankles that gets worse    Swelling of the abdomen    Redness, warmth, or swelling in one leg    Fever of 100.4 F (38 C) or higher, or as directed by your healthcare provider    Yellow color to your skin or eyes    Rapid, unexplained weight gain    Having to sleep upright or use an increased number of pillows  Date Last Reviewed: 3/31/2016    [id number] [employer]. [address] [place] [address], [place] [address]. All rights reserved. This information is not intended as a substitute for professional medical care. Always follow your  "healthcare professional's instructions.                Follow-ups after your visit        Future tests that were ordered for you today     Open Future Orders        Priority Expected Expires Ordered    **Lipid panel reflex to direct LDL FUTURE 2mo Routine 10/7/2017 2017 2017            Who to contact     If you have questions or need follow up information about today's clinic visit or your schedule please contact [facility] [place] directly at [phone].  Normal or non-critical lab and imaging results will be communicated to you by [email or web address], letter or phone within 4 business days after the clinic has received the results. If you do not hear from us within 7 days, please contact the clinic through [employer] or phone. If you have a critical or abnormal lab result, we will notify you by phone as soon as possible.  Submit refill requests through [employer] or call your pharmacy and they will forward the refill request to us. Please allow 3 business days for your refill to be completed.          Additional Information About Your Visit        [employer] Information     [employer] lets you send messages to your doctor, view your test results, renew your prescriptions, schedule appointments and more. To sign up, go to [email or web address] . Click on \"Log in\" on the left side of the screen, which will take you to the Welcome page. Then click on \"Sign up Now\" on the right side of the page.     You will be asked to enter the access code listed below, as well as some personal information. Please follow the directions to create your username and password.     Your access code is: [email or web address]  Expires: 2017 10:51 AM     Your access code will  in 90 days. If you need help or a new code, please call your [facility] or [phone].        Care EveryWhere ID     This is your Care EveryWhere ID. This could be used by other organizations to access your [facility] medical records  [id number]        Your " Vitals Were     Pulse Temperature Respirations Pulse Oximetry BMI (Body Mass Index)       91 98.1  F (36.7  C) (Oral) 20 97% 27.62 kg/m2        Blood Pressure from Last 3 Encounters:   08/08/17 140/86   06/27/17 148/86   11/16/16 118/68    Weight from Last 3 Encounters:   08/08/17 176 lb (79.8 kg)   07/13/17 165 lb (74.8 kg)   06/27/17 168 lb 12.8 oz (76.6 kg)              We Performed the Following     CBC with platelets     Comprehensive metabolic panel     PSA, screen     TSH with free T4 reflex          Where to get your medicines      Some of these will need a paper prescription and others can be bought over the counter.  Ask your nurse if you have questions.     Bring a paper prescription for each of these medications     HYDROcodone-acetaminophen 5-325 MG per tablet          Primary Care Provider Office Phone # Fax #    Osmar Natarajan PA-C [phone]       [address]  [facility] [address]        Equal Access to Services     DANII LEE : Hadii aad ku hadasho Soomaali, waaxda luqadaha, qaybta kaalmada adeegyasolitario, elke freeman . So [facility] [phone].    ATENCIÓN: Si habla español, tiene a coyne disposición servicios gratuitos de asistencia lingüística. [facility] [phone].    We comply with applicable federal civil rights laws and [place] laws. We do not discriminate on the basis of race, color, national origin, age, disability sex, sexual orientation or gender identity.            Thank you!     Thank you for choosing [facility]  for your care. Our goal is always to provide you with excellent care. Hearing back from our patients is one way we can continue to improve our services. Please take a few minutes to complete the written survey that you may receive in the mail after your visit with us. Thank you!             Your Updated Medication List - Protect others around you: Learn how to safely use, store and  throw away your medicines at www.disposemymeds.org.          This list is accurate as of: 8/8/17  2:11 PM.  Always use your most recent med list.                   Brand Name Dispense Instructions for use Diagnosis    HYDROcodone-acetaminophen 5-325 MG per tablet    NORCO    60 tablet    Take 1-2 tablets by mouth every 8 hours as needed for moderate to severe pain (Max 4 per day)    Acute bilateral low back pain without sciatica       metroNIDAZOLE 0.75 % topical gel    METROGEL     Apply topically twice per day as needed

## 2017-08-08 NOTE — NURSING NOTE
"Chief Complaint   Patient presents with     Edema     Back Pain     Panel Management     pcv, myhcart, fall risk, honoring choices, smoking cessation       Initial /86 (BP Location: Right arm, Patient Position: Chair, Cuff Size: Adult Regular)  Pulse 91  Temp 98.1  F (36.7  C) (Oral)  Resp 20  Wt 176 lb (79.8 kg)  SpO2 97%  BMI 27.62 kg/m2 Estimated body mass index is 27.62 kg/(m^2) as calculated from the following:    Height as of 7/13/17: 5' 6.93\" (1.7 m).    Weight as of this encounter: 176 lb (79.8 kg).  Medication Reconciliation: complete  "

## 2017-08-08 NOTE — PATIENT INSTRUCTIONS
- Return to clinic in 2 weeks if swelling/edema remains           Leg Swelling in Both Legs    Swelling of the feet, ankles, and legs is called edema. It is caused by excess fluid that has collected in the tissues. Extra fluid in the body settles in the lowest part because of gravity. This is why the legs and feet are most affected.  Some of the causes for edema include:    Disease of the heart like congestive heart failure    Standing or sitting for long periods of time    Infection of the feet or legs    Blood pooling in the veins of your legs (venous insufficiency)    Dilated veins in your lower leg (varicose veins)    Garters or other clothing that is tight on your legs. This will cause blood to pool in your legs because the clothing limits blood flow.    Some medicines such as hormones like birth control pills, some blood pressure medicines like calcium channel blockers (amlodipine) and steroids, some antidepressants like MAO inhibitors and tricyclics    Menstrual periods that cause you to retain fluids    Many types of renal disease    Liver failure or cirrhosis    Pregnancy, some swelling is normal, but a sudden increase in leg swelling or weight gain can be a sign of a dangerous complication of pregnancy    Poor nutrition    Thyroid disease  Medical treatment will depend on what is causing the swelling in your legs. Your healthcare provider may prescribe water pills (diuretics) to get rid of the extra fluid.  Home care  Follow these guidelines when caring for yourself at home:    Don't wear clothing like garters that is tight on your legs.    Keep your legs up while lying or sitting.    If infection, injury, or recent surgery is causing the swelling, stay off your legs as much as possible until symptoms get better.    If your healthcare provider says that your leg swelling is caused by venous insufficiency or varicose veins, don't sit or  one place for long periods of time. Take breaks and walk about  every few hours. Brisk walking is a good exercise. It helps circulate the blood that has collected in your leg. Talk with your provider about using support stockings to stop daytime leg swelling.    If your provider says that heart disease is causing your leg swelling, follow a low-salt diet to stop extra fluid from staying in your body. You may also need medicine.  Follow-up care  Follow up with your healthcare provider, or as advised.  When to seek medical advice  Call your healthcare provider right away if any of these occur:    New shortness of breath or chest pain    Shortness of breath or chest pain that gets worse    Swelling in both legs or ankles that gets worse    Swelling of the abdomen    Redness, warmth, or swelling in one leg    Fever of 100.4 F (38 C) or higher, or as directed by your healthcare provider    Yellow color to your skin or eyes    Rapid, unexplained weight gain    Having to sleep upright or use an increased number of pillows  Date Last Reviewed: 3/31/2016    [id number] [employer]. [address] [place] [address], [place] [address]. All rights reserved. This information is not intended as a substitute for professional medical care. Always follow your healthcare professional's instructions.

## 2017-08-09 ENCOUNTER — TELEPHONE (OUTPATIENT)
Dept: FAMILY MEDICINE | Facility: OTHER | Age: 82
End: 2017-08-09

## 2017-08-09 DIAGNOSIS — R79.89 ELEVATED SERUM CREATININE: Primary | ICD-10-CM

## 2017-08-09 LAB
ALBUMIN SERPL-MCNC: 3.8 G/DL (ref 3.4–5)
ALP SERPL-CCNC: 70 U/L (ref 40–150)
ALT SERPL W P-5'-P-CCNC: 25 U/L (ref 0–70)
ANION GAP SERPL CALCULATED.3IONS-SCNC: 7 MMOL/L (ref 3–14)
AST SERPL W P-5'-P-CCNC: 21 U/L (ref 0–45)
BILIRUB SERPL-MCNC: 0.6 MG/DL (ref 0.2–1.3)
BUN SERPL-MCNC: 23 MG/DL (ref 7–30)
CALCIUM SERPL-MCNC: 9 MG/DL (ref 8.5–10.1)
CHLORIDE SERPL-SCNC: 104 MMOL/L (ref 94–109)
CO2 SERPL-SCNC: 26 MMOL/L (ref 20–32)
CREAT SERPL-MCNC: 1.46 MG/DL (ref 0.66–1.25)
GFR SERPL CREATININE-BSD FRML MDRD: 46 ML/MIN/1.7M2
GLUCOSE SERPL-MCNC: 91 MG/DL (ref 70–99)
POTASSIUM SERPL-SCNC: 4.6 MMOL/L (ref 3.4–5.3)
PROT SERPL-MCNC: 7.3 G/DL (ref 6.8–8.8)
PSA SERPL-ACNC: 1.81 UG/L (ref 0–4)
SODIUM SERPL-SCNC: 137 MMOL/L (ref 133–144)
TSH SERPL DL<=0.005 MIU/L-ACNC: 2.13 MU/L (ref 0.4–4)

## 2017-08-09 NOTE — TELEPHONE ENCOUNTER
Please notify patient that his hemoglobin is stable along with normal PSA and thyroid testing. His glucose, electrolytes and liver function are normal but his kidney function is slightly lower than normal. This can sometime be due to dehydration so he should make sure he drinks at least 60 oz of water daily and recheck kidney labs in 1 week.    Osmar Natarajan PA-C

## 2017-08-09 NOTE — TELEPHONE ENCOUNTER
Patient presented to the clinic looking to hear his lab results from 08/08/2017. Routing to  to review and advise on labs. Patient is currently in the lobby waiting on results. Beth Goodson RN, BSN

## 2017-08-09 NOTE — TELEPHONE ENCOUNTER
Discussed lab values with the patient. Drinking about 6 cups of coffee per day. Discussed increase water for the next week and repeating the lab in 1 week. Patient has been wearing compression stockings. Scheduled lab appointment.   Beth Goodson RN, BSN

## 2017-08-10 ENCOUNTER — TELEPHONE (OUTPATIENT)
Dept: FAMILY MEDICINE | Facility: OTHER | Age: 82
End: 2017-08-10

## 2017-08-10 PROBLEM — R03.0 ELEVATED BLOOD PRESSURE READING WITHOUT DIAGNOSIS OF HYPERTENSION: Status: ACTIVE | Noted: 2017-08-10

## 2017-08-10 NOTE — PROGRESS NOTES
Please call patient with the following message    I would like him to make an appointment to discuss some findings on his lab results.     Ruddy Hickey PA-C

## 2017-08-10 NOTE — TELEPHONE ENCOUNTER
----- Message from Sarah Hickey PA-C sent at 8/10/2017  9:01 AM CDT -----  Please call patient with the following message    I would like him to make an appointment to discuss some findings on his lab results.     Ruddy Hickey PA-C

## 2017-08-11 NOTE — TELEPHONE ENCOUNTER
Luther said that he has already discussed this with a nurse and he feels comfortable with results, and doesn't feel he needs to schedule an appointment.   He received a print out on 8/8.     He has another lab on 8/17.

## 2017-08-15 NOTE — PROGRESS NOTES
SUBJECTIVE:                                                    Luther Kam is a 82 year old male who presents to clinic today for the following health issues:      HPI    Would like foot swelling recheck, left foot  Just noticed it a couple weeks ago - better after sleep  Does wear compression stockings, but doesn't seem to help with the swelling  - Sleeps with feet elevated   - Throughout the day comes back   - Really started since stopped walking due to compression fractures in back    - Used to walk 3 miles per day, not since hurt his back     Review labs from 8/8/17 and 8/17/17   - Drinks 8 cups coffee per day  - Since stopped in, added in 8 cups of water     - Frequent urination   - Getting up 6x/night to sleep         Problem list and histories reviewed & adjusted, as indicated.  Additional history: as documented    BP Readings from Last 3 Encounters:   08/08/17 140/86   06/27/17 148/86   11/16/16 118/68    Wt Readings from Last 3 Encounters:   08/08/17 176 lb (79.8 kg)   07/13/17 165 lb (74.8 kg)   06/27/17 168 lb 12.8 oz (76.6 kg)            Labs reviewed in EPIC      Message from 8/9/17  Please notify patient that his hemoglobin is stable along with normal PSA and thyroid testing. His glucose, electrolytes and liver function are normal but his kidney function is slightly lower than normal. This can sometime be due to dehydration so he should make sure he drinks at least 60 oz of water daily and recheck kidney labs in 1 week.          ROS:  Constitutional, HEENT, cardiovascular, pulmonary, gi and gu systems are negative, except as otherwise noted.      OBJECTIVE:   /70 (BP Location: Left arm, Patient Position: Chair, Cuff Size: Adult Regular)  Pulse 100  Temp 97.9  F (36.6  C) (Oral)  Resp 20  Wt 172 lb (78 kg)  SpO2 100%  BMI 27 kg/m2  Body mass index is 27 kg/(m^2).  GENERAL APPEARANCE: healthy, alert and no distress  EYES: Eyes grossly normal to inspection, PERRLA, conjunctivae and sclerae  without injection or discharge, EOM intact   MS: No musculoskeletal defects are noted and gait is age appropriate without ataxia   SKIN: No suspicious lesions or rashes, hydration status appears adeuqate with normal skin turgor   PSYCH: Alert and oriented x3; speech- coherent , normal rate and volume; able to articulate logical thoughts, able to abstract reason, no tangential thoughts, no hallucinations or delusions, mentation appears normal, Mood is euthymic. Affect is appropriate for this mood state and bright. Thought content is free of suicidal ideation, hallucinations, and delusions. Dress is adequate and upkept. Eye contact is good during conversation.       Diagnostic Test Results:  Results for orders placed or performed in visit on 08/17/17   **Lipid panel reflex to direct LDL FUTURE 2mo   Result Value Ref Range    Cholesterol 153 <200 mg/dL    Triglycerides 47 <150 mg/dL    HDL Cholesterol 82 >39 mg/dL    LDL Cholesterol Calculated 62 <100 mg/dL    Non HDL Cholesterol 71 <130 mg/dL   Basic metabolic panel  (Ca, Cl, CO2, Creat, Gluc, K, Na, BUN)   Result Value Ref Range    Sodium 140 133 - 144 mmol/L    Potassium 4.4 3.4 - 5.3 mmol/L    Chloride 104 94 - 109 mmol/L    Carbon Dioxide 29 20 - 32 mmol/L    Anion Gap 7 3 - 14 mmol/L    Glucose 83 70 - 99 mg/dL    Urea Nitrogen 21 7 - 30 mg/dL    Creatinine 0.82 0.66 - 1.25 mg/dL    GFR Estimate 90 >60 mL/min/1.7m2    GFR Estimate If Black >90 >60 mL/min/1.7m2    Calcium 9.1 8.5 - 10.1 mg/dL       Labs from 8/8/17    Creatinine 1.46 (H) 0.66 - 1.25 mg/dL Final 08/08/2017  2:14 PM [facility]   GFR Estimate 46 (L) >60 mL/min/1.7m2 Final 08/08/2017  2:14 PM [facility]   Comment:   Non  GFR Calc   GFR Estimate If Black 56 (L              ASSESSMENT/PLAN:       ICD-10-CM    1. Elevated blood pressure reading without diagnosis of hypertension R03.0    2. 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 Localized edema R60.0    4. Urinary frequency R35.0 tamsulosin  (FLOMAX) 0.4 MG capsule     DISCONTINUED: tamsulosin (FLOMAX) 0.4 MG capsule   5. Compression fracture of L1 lumbar vertebra, with routine healing, subsequent encounter S32.010D    6. Compression fracture of T12 vertebra, with routine healing, subsequent encounter S22.080D      - BP lower today, likely elevated at past visits due to his pain   - Reviewed all labs as above, slight increase in Cr likely due to rehydration, has now improved, spent time discussing each result   - No further concerns, recheck yearly   - Discussed hemoglobin was not recheck, borderline low, no concerns, patient then stated has been told that since his early 20's   - Edema      Labs support no concerns, no exam findings for edema this time, no concerns for HF      Discussed likely venous insufficiency, worsened due to lack of exercise from his back injury      Continue to elevate and wear compression socks     Likely will resolve when he is able to walk again once back is healed   - Urinary frequency      Again, labs show nothing concerning, PSA in normal range     Been ongoing for year     Likely due to BPH      Discussed trial of Flomax, discussed use and side effects      Should also limit fluid intake after 8 pm      Recheck 1 month   - Continues to use pain medication periodically for his compression fractures, doesn't need refill today, has follow up with neurosurgery, wearing his back brace daily       The patient indicates understanding of these issues and agrees with the plan.    Follow up: 1 month       Sarah Hickey PA-C  [facility]

## 2017-08-17 DIAGNOSIS — R79.89 ELEVATED SERUM CREATININE: ICD-10-CM

## 2017-08-17 DIAGNOSIS — Z13.6 CARDIOVASCULAR SCREENING; LDL GOAL LESS THAN 160: ICD-10-CM

## 2017-08-17 LAB
ANION GAP SERPL CALCULATED.3IONS-SCNC: 7 MMOL/L (ref 3–14)
BUN SERPL-MCNC: 21 MG/DL (ref 7–30)
CALCIUM SERPL-MCNC: 9.1 MG/DL (ref 8.5–10.1)
CHLORIDE SERPL-SCNC: 104 MMOL/L (ref 94–109)
CHOLEST SERPL-MCNC: 153 MG/DL
CO2 SERPL-SCNC: 29 MMOL/L (ref 20–32)
CREAT SERPL-MCNC: 0.82 MG/DL (ref 0.66–1.25)
GFR SERPL CREATININE-BSD FRML MDRD: 90 ML/MIN/1.7M2
GLUCOSE SERPL-MCNC: 83 MG/DL (ref 70–99)
HDLC SERPL-MCNC: 82 MG/DL
LDLC SERPL CALC-MCNC: 62 MG/DL
NONHDLC SERPL-MCNC: 71 MG/DL
POTASSIUM SERPL-SCNC: 4.4 MMOL/L (ref 3.4–5.3)
SODIUM SERPL-SCNC: 140 MMOL/L (ref 133–144)
TRIGL SERPL-MCNC: 47 MG/DL

## 2017-08-17 PROCEDURE — 36415 COLL VENOUS BLD VENIPUNCTURE: CPT | Performed by: PHYSICIAN ASSISTANT

## 2017-08-17 PROCEDURE — 80048 BASIC METABOLIC PNL TOTAL CA: CPT | Performed by: PHYSICIAN ASSISTANT

## 2017-08-17 PROCEDURE — 80061 LIPID PANEL: CPT | Performed by: PHYSICIAN ASSISTANT

## 2017-08-18 ENCOUNTER — OFFICE VISIT (OUTPATIENT)
Dept: FAMILY MEDICINE | Facility: OTHER | Age: 82
End: 2017-08-18
Payer: COMMERCIAL

## 2017-08-18 VITALS
DIASTOLIC BLOOD PRESSURE: 70 MMHG | RESPIRATION RATE: 20 BRPM | HEART RATE: 100 BPM | SYSTOLIC BLOOD PRESSURE: 124 MMHG | TEMPERATURE: 97.9 F | BODY MASS INDEX: 27 KG/M2 | OXYGEN SATURATION: 100 % | WEIGHT: 172 LBS

## 2017-08-18 DIAGNOSIS — R35.0 URINARY FREQUENCY: ICD-10-CM

## 2017-08-18 DIAGNOSIS — S32.010D COMPRESSION FRACTURE OF L1 LUMBAR VERTEBRA, WITH ROUTINE HEALING, SUBSEQUENT ENCOUNTER: ICD-10-CM

## 2017-08-18 DIAGNOSIS — D64.9 LOW HEMOGLOBIN: ICD-10-CM

## 2017-08-18 DIAGNOSIS — S22.080D COMPRESSION FRACTURE OF T12 VERTEBRA, WITH ROUTINE HEALING, SUBSEQUENT ENCOUNTER: ICD-10-CM

## 2017-08-18 DIAGNOSIS — R60.0 LOCALIZED EDEMA: ICD-10-CM

## 2017-08-18 DIAGNOSIS — R03.0 ELEVATED BLOOD PRESSURE READING WITHOUT DIAGNOSIS OF HYPERTENSION: Primary | ICD-10-CM

## 2017-08-18 PROCEDURE — 99214 OFFICE O/P EST MOD 30 MIN: CPT | Performed by: PHYSICIAN ASSISTANT

## 2017-08-18 RX ORDER — TAMSULOSIN HYDROCHLORIDE 0.4 MG/1
0.4 CAPSULE ORAL DAILY
Qty: 30 CAPSULE | Refills: 1 | Status: SHIPPED | OUTPATIENT
Start: 2017-08-18 | End: 2018-02-05

## 2017-08-18 RX ORDER — TAMSULOSIN HYDROCHLORIDE 0.4 MG/1
0.4 CAPSULE ORAL DAILY
Qty: 30 CAPSULE | Refills: 1 | Status: SHIPPED | OUTPATIENT
Start: 2017-08-18 | End: 2017-08-18

## 2017-08-18 NOTE — NURSING NOTE
"Chief Complaint   Patient presents with     RECHECK     labs     Panel Management     mychart, pcv, fall risk, honoring choices, smoking cessation.         Initial /70 (BP Location: Left arm, Patient Position: Chair, Cuff Size: Adult Regular)  Pulse 100  Temp 97.9  F (36.6  C) (Oral)  Resp 20  Wt 172 lb (78 kg)  SpO2 100%  BMI 27 kg/m2 Estimated body mass index is 27 kg/(m^2) as calculated from the following:    Height as of 7/13/17: 5' 6.93\" (1.7 m).    Weight as of this encounter: 172 lb (78 kg).  Medication Reconciliation: complete  "

## 2017-08-18 NOTE — MR AVS SNAPSHOT
"              After Visit Summary   8/18/2017    Luther Kam    MRN: [id number]           Patient Information     Date Of Birth          1935        Visit Information        Provider Department      8/18/2017 11:00 AM Sarah Hickey PA-C [facility]        Today's Diagnoses     Elevated blood pressure reading without diagnosis of hypertension    -  1    Tobacco use disorder        Need for prophylactic vaccination against Streptococcus pneumoniae (pneumococcus)        Alcohol abuse, episodic drinking behavior        Urinary frequency          Care Instructions    - Flomax tablet once a day  - Recheck 1 month   - No drinking after 8 pm               Follow-ups after your visit        Who to contact     If you have questions or need follow up information about today's clinic visit or your schedule please contact [facility] directly at [phone].  Normal or non-critical lab and imaging results will be communicated to you by AmVachart, letter or phone within 4 business days after the clinic has received the results. If you do not hear from us within 7 days, please contact the clinic through AmVachart or phone. If you have a critical or abnormal lab result, we will notify you by phone as soon as possible.  Submit refill requests through UUCUN or call your pharmacy and they will forward the refill request to us. Please allow 3 business days for your refill to be completed.          Additional Information About Your Visit        MyChart Information     UUCUN lets you send messages to your doctor, view your test results, renew your prescriptions, schedule appointments and more. To sign up, go to [email or web address] . Click on \"Log in\" on the left side of the screen, which will take you to the Welcome page. Then click on \"Sign up Now\" on the right side of the page.     You will be asked to enter the access code listed below, as well as some personal information. " Please follow the directions to create your username and password.     Your access code is: [email or web address]  Expires: 2017 10:51 AM     Your access code will  in 90 days. If you need help or a new code, please call your [place] clinic or [phone].        Care EveryWhere ID     This is your Care EveryWhere ID. This could be used by other organizations to access your [place] medical records  [id number]        Your Vitals Were     Pulse Temperature Respirations Pulse Oximetry BMI (Body Mass Index)       100 97.9  F (36.6  C) (Oral) 20 100% 27 kg/m2        Blood Pressure from Last 3 Encounters:   17 124/70   17 140/86   17 148/86    Weight from Last 3 Encounters:   17 172 lb (78 kg)   17 176 lb (79.8 kg)   17 165 lb (74.8 kg)              We Performed the Following     TOBACCO CESSATION ORDER FOR [facility]          Today's Medication Changes          These changes are accurate as of: 17 11:27 AM.  If you have any questions, ask your nurse or doctor.               Start taking these medicines.        Dose/Directions    tamsulosin 0.4 MG capsule   Commonly known as:  FLOMAX   Used for:  Urinary frequency   Started by:  Sarah Hickey PA-C        Dose:  0.4 mg   Take 1 capsule (0.4 mg) by mouth daily   Quantity:  30 capsule   Refills:  1            Where to get your medicines      These medications were sent to [facility] [address] [place] [id number] [address] [address]     Phone:  [phone]     tamsulosin 0.4 MG capsule                Primary Care Provider Office Phone # Fax #    Osmar Natarajan PA-C [phone]       [address] [place] [address]        Equal Access to Services     MITZY LEE AH: Michael bellamy Somaddie, waleti luqadaha, qaybta kaalelke wilson. [facility] [phone].    ATENCIÓN: Si olga joyce coyne  disposición servicios gratuitos de asistencia lingüística. Stacia thompson [phone].    We comply with applicable federal civil rights laws and [place] laws. We do not discriminate on the basis of race, color, national origin, age, disability sex, sexual orientation or gender identity.            Thank you!     Thank you for choosing [facility]  for your care. Our goal is always to provide you with excellent care. Hearing back from our patients is one way we can continue to improve our services. Please take a few minutes to complete the written survey that you may receive in the mail after your visit with us. Thank you!             Your Updated Medication List - Protect others around you: Learn how to safely use, store and throw away your medicines at www.disposemymeds.org.          This list is accurate as of: 8/18/17 11:27 AM.  Always use your most recent med list.                   Brand Name Dispense Instructions for use Diagnosis    HYDROcodone-acetaminophen 5-325 MG per tablet    NORCO    60 tablet    Take 1-2 tablets by mouth every 8 hours as needed for moderate to severe pain (Max 4 per day)    Acute bilateral low back pain without sciatica       metroNIDAZOLE 0.75 % topical gel    METROGEL     Apply topically twice per day as needed        tamsulosin 0.4 MG capsule    FLOMAX    30 capsule    Take 1 capsule (0.4 mg) by mouth daily    Urinary frequency

## 2017-09-07 ENCOUNTER — RADIANT APPOINTMENT (OUTPATIENT)
Dept: GENERAL RADIOLOGY | Facility: OTHER | Age: 82
End: 2017-09-07
Attending: PHYSICIAN ASSISTANT
Payer: COMMERCIAL

## 2017-09-07 ENCOUNTER — OFFICE VISIT (OUTPATIENT)
Dept: NEUROSURGERY | Facility: OTHER | Age: 82
End: 2017-09-07
Payer: COMMERCIAL

## 2017-09-07 VITALS — BODY MASS INDEX: 27 KG/M2 | HEIGHT: 67 IN | WEIGHT: 172 LBS | TEMPERATURE: 97.7 F

## 2017-09-07 DIAGNOSIS — S32.000A COMPRESSION FRACTURE OF LUMBAR VERTEBRA, CLOSED, INITIAL ENCOUNTER (H): ICD-10-CM

## 2017-09-07 DIAGNOSIS — S22.080D COMPRESSION FRACTURE OF T12 VERTEBRA, WITH ROUTINE HEALING, SUBSEQUENT ENCOUNTER: Primary | ICD-10-CM

## 2017-09-07 DIAGNOSIS — S32.010D COMPRESSION FRACTURE OF L1 LUMBAR VERTEBRA WITH ROUTINE HEALING: ICD-10-CM

## 2017-09-07 PROCEDURE — 99213 OFFICE O/P EST LOW 20 MIN: CPT | Performed by: PHYSICIAN ASSISTANT

## 2017-09-07 PROCEDURE — 72100 X-RAY EXAM L-S SPINE 2/3 VWS: CPT

## 2017-09-07 NOTE — MR AVS SNAPSHOT
"              After Visit Summary   9/7/2017    Luther Kam    MRN: [id number]           Patient Information     Date Of Birth          1935        Visit Information        Provider Department      9/7/2017 11:05 AM Yuni Lancaster PA-C [facility]        Today's Diagnoses     Compression fracture of T12 vertebra, with routine healing, subsequent encounter    -  1    Compression fracture of L1 lumbar vertebra with routine healing          Care Instructions    1. Follow up in 6 weeks with xray prior    2. Continue to wear brace when out of bed          Follow-ups after your visit        Future tests that were ordered for you today     Open Future Orders        Priority Expected Expires Ordered    XR Lumbar Spine 2/3 Views Routine 10/19/2017 9/7/2018 9/7/2017            Who to contact     If you have questions or need follow up information about today's clinic visit or your schedule please contact [facility] directly at [phone].  Normal or non-critical lab and imaging results will be communicated to you by MyChart, letter or phone within 4 business days after the clinic has received the results. If you do not hear from us within 7 days, please contact the clinic through Global Renewableshart or phone. If you have a critical or abnormal lab result, we will notify you by phone as soon as possible.  Submit refill requests through [employer] or call your pharmacy and they will forward the refill request to us. Please allow 3 business days for your refill to be completed.          Additional Information About Your Visit        Global Renewableshart Information     F[employer] Focus lets you send messages to your doctor, view your test results, renew your prescriptions, schedule appointments and more. To sign up, go to [email or web address] . Click on \"Log in\" on the left side of the screen, which will take you to the Welcome page. Then click on \"Sign up Now\" on the right side of the page.     You will be asked to " "enter the access code listed below, as well as some personal information. Please follow the directions to create your username and password.     Your access code is: [email or web address]  Expires: 2017 10:51 AM     Your access code will  in 90 days. If you need help or a new code, please call your [place] clinic or [phone].        Care EveryWhere ID     This is your Care EveryWhere ID. This could be used by other organizations to access your [place] medical records  [id number]        Your Vitals Were     Temperature Height BMI (Body Mass Index)             97.7  F (36.5  C) (Temporal) 5' 6.93\" (1.7 m) 27 kg/m2          Blood Pressure from Last 3 Encounters:   17 124/70   17 140/86   17 148/86    Weight from Last 3 Encounters:   17 172 lb (78 kg)   17 172 lb (78 kg)   17 176 lb (79.8 kg)               Primary Care Provider Office Phone # Fax #    Osmar Natarajan PA-C [phone]       [address] [facility] [address]  [facility] [address]        Equal Access to Services     MITZY LEE : Hadii david capone hadasho Soomaali, waaxda luqadaha, qaybta kaalmada adeegyada, elke freeman . So [facility] [phone].    ATENCIÓN: Si habla español, tiene a coyne disposición servicios gratuitos de asistencia lingüística. Llame al [phone].    We comply with applicable federal civil rights laws and [place] laws. We do not discriminate on the basis of race, color, national origin, age, disability sex, sexual orientation or gender identity.            Thank you!     Thank you for choosing [facility]  for your care. Our goal is always to provide you with excellent care. Hearing back from our patients is one way we can continue to improve our services. Please take a few minutes to complete the written survey that you may receive in the mail after your visit with us. Thank you!             Your Updated Medication List - Protect others around " you: Learn how to safely use, store and throw away your medicines at www.disposemymeds.org.          This list is accurate as of: 9/7/17 11:25 AM.  Always use your most recent med list.                   Brand Name Dispense Instructions for use Diagnosis    HYDROcodone-acetaminophen 5-325 MG per tablet    NORCO    60 tablet    Take 1-2 tablets by mouth every 8 hours as needed for moderate to severe pain (Max 4 per day)    Acute bilateral low back pain without sciatica       metroNIDAZOLE 0.75 % topical gel    METROGEL     Apply topically twice per day as needed        tamsulosin 0.4 MG capsule    FLOMAX    30 capsule    Take 1 capsule (0.4 mg) by mouth daily    Urinary frequency

## 2017-09-07 NOTE — NURSING NOTE
"Luther Kam is a 82 year old male who presents for:  Chief Complaint   Patient presents with     Consult     LBP with sciatica     Neurologic Problem        Initial Vitals:  Temp 97.7  F (36.5  C) (Temporal)  Ht 5' 6.93\" (1.7 m)  Wt 172 lb (78 kg)  BMI 27 kg/m2 Estimated body mass index is 27 kg/(m^2) as calculated from the following:    Height as of this encounter: 5' 6.93\" (1.7 m).    Weight as of this encounter: 172 lb (78 kg).. Body surface area is 1.92 meters squared. BP completed using cuff size: NA (Not Taken)  Data Unavailable    Do you feel safe in your environment?  Yes  Do you need any refills today? No    Nursing Comments:         Mukund Marquez    "

## 2017-09-07 NOTE — PROGRESS NOTES
[facility]  Neurosurgery followup:    HPI: 82 year old male presents for a 7 week f/u of an acute/subacute T12 and L1 compression fracture. He has been wearing [place] brace. States his pain is still located in low back and has been improving. States when he is sitting still he does not have pain. Pain mainly worsens with walking and bending. He has been taking Norco for pain, which is helpful. Denies radicular pain.   Exam:  Constitutional:  Alert, well nourished, NAD.  HEENT: Normocephalic, atraumatic.   Pulm:  Without shortness of breath   CV:  No pitting edema of BLE.      Neurological:  Awake  Alert  Oriented x 3  Motor exam:        IP Q DF PF EHL  R   5  5   5   5    5  L   5  5   5   5    5     Reflexes are 2+ in the patellar and Achilles. There is no clonus. Downgoing Babinski.    Able to spontaneously move L/E bilaterally  Sensation intact throughout all L/E dermatomes  Imaging:   LUMBAR SPINE TWO TO THREE VIEWS   9/7/2017 11:14 AM      HISTORY: Wedge compression fracture of unspecified lumbar vertebra,  initial encounter for closed fracture.     COMPARISON: MRI lumbar spine dated 7/20/2017 and plain films lumbar spine dated 6/27/2017.     FINDINGS:  There are five non-rib bearing lumbar type vertebrae. There has been further progression of the superoanterior endplate compression fracture of T12 which now measures approximately 50% of vertebral body height. Superior endplate compression of L1 is again noted and is unchanged since the prior MRI. No other fractures are identified. Anterior spondylotic spurring is seen throughout the lumbar spine. Left lateral spondylotic spurring is also noted in the upper lumbar spine. Disc spaces are grossly maintained. Facet arthropathy seen from L4 through S1, worst at L5-S1. Pedicles are  intact. SI joints demonstrate mild degenerative change but otherwise unremarkable. Psoas margins are maintained. Moderate amount of stool is projected over the colon. Aortic  atherosclerotic calculations are noted.      IMPRESSION:  1. Slight progression of the T12 vertebral compression fracture since the prior study.  2. No change in the superior endplate compression fracture of L1.  3. Mild degenerative changes throughout lumbar spine are again noted.  4. Aortic atherosclerosis.  A/P: 7 week f/u of an acute/subacute T12 and L1 compression fracture. His pain is improving, which he is happy about. Lumbar xray reviewed in office with patient today. Advised to continue to wear brace when out of bed and follow up in 6 weeks with xray prior. Patient voiced understanding and agreement.      Yuni Lancaster PA-C  [facility]  [address] [address]    Tel [phone]  Pager [phone]

## 2017-10-05 NOTE — PROGRESS NOTES
SUBJECTIVE:                                                    Luther Kam is a 82 year old male who presents to clinic today for the following health issues:      HPI     Pt also states he needs a refill of his hydrocodone  Is not taking every day taking just as needed for severe pain.     Back Pain Follow Up      Description:   Location of pain:  bilateral  Character of pain: sharp and dull ache  Pain radiation: Does not radiate  Since last visit, pain is:  Improved, slowly  New numbness or weakness in legs, not attributed to pain:  no     Intensity: Currently 2/10, At its worst 7/10    History:   Pain interferes with job: Not applicable  Therapies tried without relief: na  Therapies tried with relief: pain meds    Accompanying Signs & Symptoms:  Risk of Fracture:  None  Risk of Cauda Equina:  None  Risk of Infection:  None  Risk of Cancer:  None    Amount of exercise or physical activity: 2-3 days/week for an average of 15-30 minutes    Problems taking medications regularly: No    Medication side effects: none    Concern - lump on back of thigh  Onset: 3-4 days ago    Description:   Back of right thigh    Intensity: mild    Progression of Symptoms:  same    Accompanying Signs & Symptoms:  Red, raised, itches when he touches    Previous history of similar problem:   none    Precipitating factors:   Worsened by: none    Alleviating factors:  Improved by: none    Therapies Tried and outcome: none      Problem list and histories reviewed & adjusted, as indicated.  Additional history: as documented      BP Readings from Last 3 Encounters:   10/06/17 138/80   08/18/17 124/70   08/08/17 140/86    Wt Readings from Last 3 Encounters:   10/06/17 170 lb (77.1 kg)   09/07/17 172 lb (78 kg)   08/18/17 172 lb (78 kg)                  Labs reviewed in EPIC      ROS:  Constitutional, HEENT, cardiovascular, pulmonary, gi and gu systems are negative, except as otherwise noted.      OBJECTIVE:   /80 (BP Location: Left arm,  Patient Position: Chair, Cuff Size: Adult Regular)  Pulse 88  Temp 98.1  F (36.7  C) (Oral)  Resp 16  Wt 170 lb (77.1 kg)  SpO2 99%  BMI 26.68 kg/m2  Body mass index is 26.68 kg/(m^2).  GENERAL: healthy, alert and no distress  NECK: no adenopathy, no asymmetry, masses, or scars and thyroid normal to palpation  RESP: lungs clear to auscultation - no rales, rhonchi or wheezes  CV: regular rate and rhythm, normal S1 S2, no S3 or S4, no murmur, click or rub, no peripheral edema and peripheral pulses strong  ABDOMEN: soft, nontender, no hepatosplenomegaly, no masses and bowel sounds normal  MS: no gross musculoskeletal defects noted, no edema. Back brace in place.   SKIN: Dry, inflamed, erythema, non-tender, .5 cm diameter area posterior upper leg right side.  NEURO: Normal strength and tone, mentation intact and speech normal    Diagnostic Test Results:  none     ASSESSMENT/PLAN:     1. Skin lesion of right leg  Does not appear to be an infection. Denies pain. States has not increased in size. Recommend treatment with topical cream if symptoms change/worsen will return to clinic for further evaluation and treatment.  - triamcinolone (KENALOG) 0.1 % cream; Apply sparingly to affected area three times daily as needed  Dispense: 80 g; Refill: 0    2. Acute bilateral low back pain without sciatica  History of back pain. Denies adverse side effects.   - HYDROcodone-acetaminophen (NORCO) 5-325 MG per tablet; Take 1-2 tablets by mouth every 8 hours as needed for moderate to severe pain (Max 4 per day)  Dispense: 60 tablet; Refill: 0    Home care instructions were reviewed with the patient. The risks, benefits and treatment options of prescribed medications or other treatments have been discussed with the patient. The patient verbalized their understanding and should call or follow up if no improvement or if they develop further problems.  Return to clinic prn    Patient Instructions   Please continue to monitor skin  lesion if this area becomes more inflamed, red, tender, or starts to ooze return to clinic for further evaluation and treatment.     Thank you  Divina Falcon [facility]

## 2017-10-06 ENCOUNTER — OFFICE VISIT (OUTPATIENT)
Dept: FAMILY MEDICINE | Facility: OTHER | Age: 82
End: 2017-10-06
Payer: COMMERCIAL

## 2017-10-06 VITALS
BODY MASS INDEX: 26.68 KG/M2 | TEMPERATURE: 98.1 F | SYSTOLIC BLOOD PRESSURE: 138 MMHG | RESPIRATION RATE: 16 BRPM | WEIGHT: 170 LBS | HEART RATE: 88 BPM | OXYGEN SATURATION: 99 % | DIASTOLIC BLOOD PRESSURE: 80 MMHG

## 2017-10-06 DIAGNOSIS — L98.9 SKIN LESION OF RIGHT LEG: Primary | ICD-10-CM

## 2017-10-06 DIAGNOSIS — M54.50 ACUTE BILATERAL LOW BACK PAIN WITHOUT SCIATICA: ICD-10-CM

## 2017-10-06 PROCEDURE — 99214 OFFICE O/P EST MOD 30 MIN: CPT | Performed by: NURSE PRACTITIONER

## 2017-10-06 RX ORDER — TRIAMCINOLONE ACETONIDE 1 MG/G
CREAM TOPICAL
Qty: 80 G | Refills: 0 | Status: SHIPPED | OUTPATIENT
Start: 2017-10-06 | End: 2021-04-07

## 2017-10-06 RX ORDER — HYDROCODONE BITARTRATE AND ACETAMINOPHEN 5; 325 MG/1; MG/1
1-2 TABLET ORAL EVERY 8 HOURS PRN
Qty: 60 TABLET | Refills: 0 | Status: SHIPPED | OUTPATIENT
Start: 2017-10-06 | End: 2017-12-06

## 2017-10-06 ASSESSMENT — PAIN SCALES - GENERAL: PAINLEVEL: MILD PAIN (2)

## 2017-10-06 NOTE — PATIENT INSTRUCTIONS
Please continue to monitor skin lesion if this area becomes more inflamed, red, tender, or starts to ooze return to clinic for further evaluation and treatment.     Thank you  Divina Falcon CNP

## 2017-10-06 NOTE — MR AVS SNAPSHOT
"              After Visit Summary   10/6/2017    Luther Kam    MRN: [id number]           Patient Information     Date Of Birth          1935        Visit Information        Provider Department      10/6/2017 10:40 AM Divina Falcon APRN CNP [facility]        Today's Diagnoses     Skin lesion of right leg    -  1    At risk for falling        Need for prophylactic vaccination and inoculation against influenza        Need for prophylactic vaccination against Streptococcus pneumoniae (pneumococcus)        Acute bilateral low back pain without sciatica          Care Instructions    Please continue to monitor skin lesion if this area becomes more inflamed, red, tender, or starts to ooze return to clinic for further evaluation and treatment.     Thank you  Divina Falcon CNP            Follow-ups after your visit        Who to contact     If you have questions or need follow up information about today's clinic visit or your schedule please contact [facility] directly at [phone].  Normal or non-critical lab and imaging results will be communicated to you by MyChart, letter or phone within 4 business days after the clinic has received the results. If you do not hear from us within 7 days, please contact the clinic through Ecatohart or phone. If you have a critical or abnormal lab result, we will notify you by phone as soon as possible.  Submit refill requests through CleanApp or call your pharmacy and they will forward the refill request to us. Please allow 3 business days for your refill to be completed.          Additional Information About Your Visit        MyChart Information     CleanApp lets you send messages to your doctor, view your test results, renew your prescriptions, schedule appointments and more. To sign up, go to [email or web address] . Click on \"Log in\" on the left side of the screen, which will take you to the Welcome page. Then click on \"Sign up Now\" " on the right side of the page.     You will be asked to enter the access code listed below, as well as some personal information. Please follow the directions to create your username and password.     Your access code is: [email or web address]  Expires: 2018 11:12 AM     Your access code will  in 90 days. If you need help or a new code, please call your [facility] or [phone].        Care EveryWhere ID     This is your Care EveryWhere ID. This could be used by other organizations to access your [place] medical records  [id number]        Your Vitals Were     Pulse Temperature Respirations Pulse Oximetry BMI (Body Mass Index)       88 98.1  F (36.7  C) (Oral) 16 99% 26.68 kg/m2        Blood Pressure from Last 3 Encounters:   10/06/17 138/80   17 124/70   17 140/86    Weight from Last 3 Encounters:   10/06/17 170 lb (77.1 kg)   17 172 lb (78 kg)   17 172 lb (78 kg)              Today, you had the following     No orders found for display         Today's Medication Changes          These changes are accurate as of: 10/6/17 11:12 AM.  If you have any questions, ask your nurse or doctor.               Start taking these medicines.        Dose/Directions    triamcinolone 0.1 % cream   Commonly known as:  KENALOG   Used for:  Skin lesion of right leg   Started by:  Divina Falcon APRN CNP        Apply sparingly to affected area three times daily as needed   Quantity:  80 g   Refills:  0            Where to get your medicines      Some of these will need a paper prescription and others can be bought over the counter.  Ask your nurse if you have questions.     Bring a paper prescription for each of these medications     HYDROcodone-acetaminophen 5-325 MG per tablet    triamcinolone 0.1 % cream                Primary Care Provider Office Phone # Fax #    Osmar Natarajan PA-C [phone]       [address] [facility] [address]  [facility] [address]        Equal Access to  Services     [facility]: Hadii aad ku hadleonorgian Angeliquemaddie, waaxda luqadaha, qaybta kaalmada veda, elke freeman . So [facility] [phone].    ATENCIÓN: Si habla martin, tiene a coyne disposición servicios gratuitos de asistencia lingüística. Llame al [phone].    We comply with applicable federal civil rights laws and [place] laws. We do not discriminate on the basis of race, color, national origin, age, disability, sex, sexual orientation, or gender identity.            Thank you!     Thank you for choosing [facility]  for your care. Our goal is always to provide you with excellent care. Hearing back from our patients is one way we can continue to improve our services. Please take a few minutes to complete the written survey that you may receive in the mail after your visit with us. Thank you!             Your Updated Medication List - Protect others around you: Learn how to safely use, store and throw away your medicines at www.disposemymeds.org.          This list is accurate as of: 10/6/17 11:12 AM.  Always use your most recent med list.                   Brand Name Dispense Instructions for use Diagnosis    HYDROcodone-acetaminophen 5-325 MG per tablet    NORCO    60 tablet    Take 1-2 tablets by mouth every 8 hours as needed for moderate to severe pain (Max 4 per day)    Acute bilateral low back pain without sciatica       metroNIDAZOLE 0.75 % topical gel    METROGEL     Apply topically twice per day as needed        tamsulosin 0.4 MG capsule    FLOMAX    30 capsule    Take 1 capsule (0.4 mg) by mouth daily    Urinary frequency       triamcinolone 0.1 % cream    KENALOG    80 g    Apply sparingly to affected area three times daily as needed    Skin lesion of right leg

## 2017-10-26 ENCOUNTER — RADIANT APPOINTMENT (OUTPATIENT)
Dept: GENERAL RADIOLOGY | Facility: OTHER | Age: 82
End: 2017-10-26
Attending: PHYSICIAN ASSISTANT
Payer: COMMERCIAL

## 2017-10-26 ENCOUNTER — OFFICE VISIT (OUTPATIENT)
Dept: NEUROSURGERY | Facility: OTHER | Age: 82
End: 2017-10-26
Payer: COMMERCIAL

## 2017-10-26 VITALS — TEMPERATURE: 97.9 F | BODY MASS INDEX: 26.68 KG/M2 | HEIGHT: 67 IN | WEIGHT: 170 LBS

## 2017-10-26 DIAGNOSIS — S22.080D COMPRESSION FRACTURE OF T12 VERTEBRA WITH ROUTINE HEALING: ICD-10-CM

## 2017-10-26 DIAGNOSIS — S32.010D COMPRESSION FRACTURE OF L1 LUMBAR VERTEBRA WITH ROUTINE HEALING: ICD-10-CM

## 2017-10-26 DIAGNOSIS — S32.010D COMPRESSION FRACTURE OF L1 LUMBAR VERTEBRA WITH ROUTINE HEALING: Primary | ICD-10-CM

## 2017-10-26 PROCEDURE — 99214 OFFICE O/P EST MOD 30 MIN: CPT | Performed by: PHYSICIAN ASSISTANT

## 2017-10-26 PROCEDURE — 72100 X-RAY EXAM L-S SPINE 2/3 VWS: CPT

## 2017-10-26 NOTE — PROGRESS NOTES
[facility]  Neurosurgery followup:    HPI: Luther Kam is an 82 year old male who presents for a 14 week f/u of an acute/subacute T12 and L1 compression fractures. He has been wearing Colbert brace. States his back pain has continued to slowly improve over the past couple of months and he is happy with how he is feeling. He has been taking Norco for pain prn, which is helpful when he has flare ups. Denies radicular pain or weakness.  Exam:  Constitutional:  Alert, well nourished, NAD.  HEENT: Normocephalic, atraumatic.   Pulm:  Without shortness of breath   CV:  No pitting edema of BLE.      Neurological:  Awake  Alert  Oriented x 3  Motor exam:        IP Q DF PF EHL  R   5  5   5   5    5  L   5  5   5   5    5     Reflexes are 2+ in the patellar and Achilles. There is no clonus. Downgoing Babinski.    Able to spontaneously move L/E bilaterally  Sensation intact throughout all L/E dermatomes     Imaging:   LUMBAR SPINE TWO TO THREE VIEWS October 26, 2017 8:42 AM      HISTORY: Wedge compression fracture of T11-T12 vertebra, subsequent encounter for fracture with routine healing. Wedge compression fracture of first lumbar vertebra, subsequent encounter for fracture with routine healing.     COMPARISON: Lumbar spine x-rays dated 9/7/2017.     FINDINGS: There are five non-rib bearing lumbar type vertebrae.  Spondylotic spurring is seen in the spine. Mild disc height loss is  seen at L3-L4 and L4-L5. This was also noted on the prior study. No  fracture or malalignment. Facet arthropathy is seen at L5-S1 and at  L4-L5, worse on the left. There are aortic atherosclerotic  calcifications. Pedicles are intact. Psoas margins are maintained. SI  joints demonstrate mild degenerative changes but otherwise  unremarkable. There is a superior endplate compression fracture of T12 which is stable since the prior study.     IMPRESSION:  1. Stable T12 superior endplate compression fracture.  2. Multilevel degenerative disc  and facet arthropathy as described  above are essentially stable since the prior x-rays.  3. No other changes.     A/P:  Luther Kam is an 82 year old male who presents for a 14 week f/u of an acute/subacute T12 and L1 compression fractures. Has been wearing brace since injury. Pain has slowly subsided. Images reviewed today reveal stable fractures. Advised patient he may d/c brace. Discussed that he may benefit from PT to work on strengthening low back. Patient states he wishes to hold off at this time, but will call if he wishes to move forward with this. He may follow up with us as needed. Patient voiced understanding and agreement.    - Call the clinic at [phone] for increased pain or any other questions and concerns.      Yuni Lancaster PA-C  [facility]  [address] [address]    Tel [phone]  Pager [phone]

## 2017-10-26 NOTE — NURSING NOTE
"Luther Kam is a 82 year old male who presents for:  Chief Complaint   Patient presents with     Neurologic Problem     T12-L1 compression fracture     RECHECK        Initial Vitals:  Temp 97.9  F (36.6  C) (Temporal)  Ht 5' 6.93\" (1.7 m)  Wt 170 lb (77.1 kg)  BMI 26.68 kg/m2 Estimated body mass index is 26.68 kg/(m^2) as calculated from the following:    Height as of this encounter: 5' 6.93\" (1.7 m).    Weight as of this encounter: 170 lb (77.1 kg).. Body surface area is 1.91 meters squared. BP completed using cuff size: NA (Not Taken)  Data Unavailable    Do you feel safe in your environment?  Yes  Do you need any refills today? No    Nursing Comments:         Mukund Marquez    "

## 2017-10-26 NOTE — MR AVS SNAPSHOT
"              After Visit Summary   10/26/2017    Luther Kam    MRN: [id number]           Patient Information     Date Of Birth          1935        Visit Information        Provider Department      10/26/2017 8:45 AM Yuni Lancaster PA-C [facility]        Today's Diagnoses     Compression fracture of L1 lumbar vertebra with routine healing    -  1    Compression fracture of T12 vertebra with routine healing           Follow-ups after your visit        Who to contact     If you have questions or need follow up information about today's clinic visit or your schedule please contact [facility] directly at [phone].  Normal or non-critical lab and imaging results will be communicated to you by [employer], letter or phone within 4 business days after the clinic has received the results. If you do not hear from us within 7 days, please contact the clinic through [employer] or phone. If you have a critical or abnormal lab result, we will notify you by phone as soon as possible.  Submit refill requests through [employer] or call your pharmacy and they will forward the refill request to us. Please allow 3 business days for your refill to be completed.          Additional Information About Your Visit        MyChart Information     [employer] lets you send messages to your doctor, view your test results, renew your prescriptions, schedule appointments and more. To sign up, go to [email or web address] . Click on \"Log in\" on the left side of the screen, which will take you to the Welcome page. Then click on \"Sign up Now\" on the right side of the page.     You will be asked to enter the access code listed below, as well as some personal information. Please follow the directions to create your username and password.     Your access code is: [email or web address]  Expires: 2018 11:12 AM     Your access code will  in 90 days. If you need help or a new code, please call your [facility] or " "[phone].        Care EveryWhere ID     This is your Care EveryWhere ID. This could be used by other organizations to access your [place] medical records  [id number]        Your Vitals Were     Temperature Height BMI (Body Mass Index)             97.9  F (36.6  C) (Temporal) 5' 6.93\" (1.7 m) 26.68 kg/m2          Blood Pressure from Last 3 Encounters:   10/06/17 138/80   08/18/17 124/70   08/08/17 140/86    Weight from Last 3 Encounters:   10/26/17 170 lb (77.1 kg)   10/06/17 170 lb (77.1 kg)   09/07/17 172 lb (78 kg)              Today, you had the following     No orders found for display       Primary Care Provider Office Phone # Fax #    Sarah CONOR Hickey PA-C [phone]       [address] [facility] [address]  [facility] [address]        Equal Access to Services     [facility]: Hadii aad ku hadasho Soomaali, waaxda luqadaha, qaybta kaalmada adeegyada, waxay idiin haygwenn jose freeman . So [facility] [phone].    ATENCIÓN: Si habla español, tiene a coyne disposición servicios gratuitos de asistencia lingüística. Llame al [phone].    We comply with applicable federal civil rights laws and [place] laws. We do not discriminate on the basis of race, color, national origin, age, disability, sex, sexual orientation, or gender identity.            Thank you!     Thank you for choosing [facility]  for your care. Our goal is always to provide you with excellent care. Hearing back from our patients is one way we can continue to improve our services. Please take a few minutes to complete the written survey that you may receive in the mail after your visit with us. Thank you!             Your Updated Medication List - Protect others around you: Learn how to safely use, store and throw away your medicines at www.disposemymeds.org.          This list is accurate as of: 10/26/17 10:51 AM.  Always use your most recent med list.                   Brand Name Dispense Instructions " for use Diagnosis    HYDROcodone-acetaminophen 5-325 MG per tablet    NORCO    60 tablet    Take 1-2 tablets by mouth every 8 hours as needed for moderate to severe pain (Max 4 per day)    Acute bilateral low back pain without sciatica       metroNIDAZOLE 0.75 % topical gel    METROGEL     Apply topically twice per day as needed        tamsulosin 0.4 MG capsule    FLOMAX    30 capsule    Take 1 capsule (0.4 mg) by mouth daily    Urinary frequency       triamcinolone 0.1 % cream    KENALOG    80 g    Apply sparingly to affected area three times daily as needed    Skin lesion of right leg

## 2017-12-06 DIAGNOSIS — M54.50 ACUTE BILATERAL LOW BACK PAIN WITHOUT SCIATICA: ICD-10-CM

## 2017-12-06 NOTE — TELEPHONE ENCOUNTER
HYDROcodone-acetaminophen (NORCO) 5-325 MG per tablet      Last Written Prescription Date:  10/6/17  Last Fill Quantity: 60,   # refills: 0  Last Office Visit: 10/06/17  Future Office visit: none    Routing refill request to provider for review/approval because:  Drug not on the [facility], [facility] or [facility] refill protocol or controlled substance

## 2017-12-06 NOTE — TELEPHONE ENCOUNTER
Reason for Call:  Medication or medication refill:    Do you use a [facility]?  Name of the pharmacy and phone number for the current request:      Name of the medication requested: pain med for broken vertebra in back    Other request: pt declines appt per PFA and would like to see if one of the provider will give him a pain med.  He has been seen for this in the past    Can we leave a detailed message on this number? YES    Phone number patient can be reached at: Home number on file [phone] (home)    Best Time: any    Call taken on 12/6/2017 at 1:09 PM by Ally Daniel

## 2017-12-07 RX ORDER — HYDROCODONE BITARTRATE AND ACETAMINOPHEN 5; 325 MG/1; MG/1
1-2 TABLET ORAL EVERY 8 HOURS PRN
Qty: 60 TABLET | Refills: 0 | Status: SHIPPED | OUTPATIENT
Start: 2017-12-07 | End: 2018-02-05

## 2017-12-07 NOTE — TELEPHONE ENCOUNTER
OK for refill. RX signed and placed in MA task.     Ruddy Hickey PA-C  [facility]

## 2017-12-22 ENCOUNTER — TELEPHONE (OUTPATIENT)
Dept: FAMILY MEDICINE | Facility: OTHER | Age: 82
End: 2017-12-22

## 2017-12-22 NOTE — TELEPHONE ENCOUNTER
"Patient presented in clinic today with an opened blister to his left hand below the thumb. He spilled hot water on his hand from the microwave about 4-5 days ago. The blister came open on it's own \"about two days ago\" per patient report. Area was reddened and slightly warm. No drainage noted and no odor noted, area was dry. Wound base was red. Cleansed open blister area, applied bacitracin and covered with bandage. Patient will cleanse area twice daily, apply bacitracin after cleansing and cover if needed. Patient verbalized understanding of dressing change and will call if he has further questions or concerns.     Sukhjinder Luna RN, BSN        "

## 2018-02-01 NOTE — PROGRESS NOTES
"  SUBJECTIVE:   Luther Kam is a 82 year old male who presents to clinic today for the following health issues:  Pt needs refill on [place]     HPI  Back Pain       Duration: Ongoing        Specific cause: none    Description:   Location of pain: low back middle  Character of pain: sharp, dull ache and stabbing  Pain radiation:radiates into the right buttocks, radiates into the right leg, radiates into the left leg and radiates into the left foot  New numbness or weakness in legs, not attributed to pain:  no     Intensity: Currently 1/10    History:   Pain interferes with job: Not applicable  History of back problems: YES - \"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\"  Any previous MRI or X-rays: Yes MRI  Sees a specialist for back pain:  No  Therapies tried without relief: none    Alleviating factors:   Improved by: opioids, rest and sitting      Precipitating factors:  Worsened by: Lifting, Bending, Standing and Walking    Functional and Psychosocial Screen (Aung STarT Back):      Declined     - Saw neurosurgery 10/26/17 (follow up T12/L1 compression fractures), OK to stop brace at this time, recommended physical therapy, but patient declined   - Norco PRN for pain     - Last time needed cane, improved but still pain   - Only 0-2 per day, gets 3-4 hours feeling more comfortable    - 4-5 left on current   - Not wearing brace any more   - Takes [place] 1-2 pm     Insomnia  Onset: Couple of months    Description:   Time to fall asleep (sleep latency): 30 minutes  Middle of night awakening:  YES  Early morning awakening:  YES    Progression of Symptoms:  same and constant    Accompanying Signs & Symptoms:  Daytime sleepiness/napping: YES- nap/rest around 3pm every day  Excessive snoring/apnea: no  Restless legs: no  Frequent urination: YES  Chronic pain:  YES    History:  Prior Insomnia: no    Precipitating factors:   New stressful situation: no  Caffeine intake: YES- coffee  OTC decongestants: no  Any new medications: " "no    Alleviating factors:  Self medicating (alcohol, etc.):  no  Therapies Tried and outcome: None    - Started Flomax August by PCP - gotten better      Avoids drinking fluids and coffee later   - Many years ago had this issue, gave pill that helps   - \"Thinker\" \"worrier\"       Problem list and histories reviewed & adjusted, as indicated.  Additional history: as documented    BP Readings from Last 3 Encounters:   10/06/17 138/80   08/18/17 124/70   08/08/17 140/86    Wt Readings from Last 3 Encounters:   10/26/17 170 lb (77.1 kg)   10/06/17 170 lb (77.1 kg)   09/07/17 172 lb (78 kg)                  Labs reviewed in EPIC    ROS:  Constitutional, HEENT, cardiovascular, pulmonary, gi and gu systems are negative, except as otherwise noted.    OBJECTIVE:     /66  Pulse 88  Temp 99  F (37.2  C) (Temporal)  Resp 16  Ht 5' 7.52\" (1.715 m)  Wt 166 lb 6.4 oz (75.5 kg)  SpO2 99%  BMI 25.66 kg/m2  Body mass index is 25.66 kg/(m^2).  GENERAL APPEARANCE: healthy, alert and no distress  EYES: Eyes grossly normal to inspection, PERRLA, conjunctivae and sclerae without injection or discharge, EOM intact   MS: No musculoskeletal defects are noted and gait is age appropriate without ataxia   SKIN: No suspicious lesions or rashes, hydration status appears adeuqate with normal skin turgor   NEURO: Deferred   BACK: Deferred   PSYCH: Alert and oriented x3; speech- coherent , normal rate and volume; able to articulate logical thoughts, able to abstract reason, no tangential thoughts, no hallucinations or delusions, mentation appears normal, Mood is euthymic. Affect is appropriate for this mood state and bright. Thought content is free of suicidal ideation, hallucinations, and delusions. Dress is adequate and upkept. Eye contact is good during conversation.       Diagnostic Test Results:  none     ASSESSMENT/PLAN:       ICD-10-CM    1. Chronic bilateral low back pain without sciatica M54.5     G89.29    2. Urinary frequency " "R35.0 tamsulosin (FLOMAX) 0.4 MG capsule   3. Psychophysiological insomnia F51.04 traZODone (DESYREL) 50 MG tablet     1. Back pain   - Stable, slight improved since stopping brace and fractures had healed   - Using Norco occasionally, 0-2/day, never later than 2 pm   - Reviewed use and side effects  - Refill given, #75 lasts many months, OK for refill if needed   -  reviewed, no concerns    - Recheck every 3 months     2. Urinary frequency   - Patient did not understand to take this consistently, not PRN   - Reviewed use and side effects  - Patient to restart as this can be part of issue with his sleep   - Recheck 1 month     3. Insomnia   - Issues with sleep initiation mostly, admits to being \"a worrier, can't turn mind off\" but can't rule out issues with his sleep due to urination as above or back pain (which patient declined, saying it's only when he is up and about)   - Will do trial of Trazodone, discussed use and side effects including sedation and grogginess   - Patient should not take this within 3 hours of Norco, discussed risks of oversedation if mixes these 2   - Recheck 1 month     The patient indicates understanding of these issues and agrees with the plan.    Follow up: 1 month         Sarah Au-SANYA Lynch  [facility]  "

## 2018-02-05 ENCOUNTER — OFFICE VISIT (OUTPATIENT)
Dept: FAMILY MEDICINE | Facility: OTHER | Age: 83
End: 2018-02-05
Payer: COMMERCIAL

## 2018-02-05 VITALS
TEMPERATURE: 99 F | OXYGEN SATURATION: 99 % | DIASTOLIC BLOOD PRESSURE: 66 MMHG | HEIGHT: 68 IN | SYSTOLIC BLOOD PRESSURE: 110 MMHG | RESPIRATION RATE: 16 BRPM | BODY MASS INDEX: 25.22 KG/M2 | HEART RATE: 88 BPM | WEIGHT: 166.4 LBS

## 2018-02-05 DIAGNOSIS — F51.04 PSYCHOPHYSIOLOGICAL INSOMNIA: ICD-10-CM

## 2018-02-05 DIAGNOSIS — R35.0 URINARY FREQUENCY: ICD-10-CM

## 2018-02-05 DIAGNOSIS — M54.50 CHRONIC BILATERAL LOW BACK PAIN WITHOUT SCIATICA: Primary | ICD-10-CM

## 2018-02-05 DIAGNOSIS — G89.29 CHRONIC BILATERAL LOW BACK PAIN WITHOUT SCIATICA: Primary | ICD-10-CM

## 2018-02-05 PROCEDURE — 99214 OFFICE O/P EST MOD 30 MIN: CPT | Performed by: PHYSICIAN ASSISTANT

## 2018-02-05 RX ORDER — TAMSULOSIN HYDROCHLORIDE 0.4 MG/1
0.4 CAPSULE ORAL DAILY
Qty: 30 CAPSULE | Refills: 5 | Status: SHIPPED | OUTPATIENT
Start: 2018-02-05 | End: 2018-04-03

## 2018-02-05 RX ORDER — TRAZODONE HYDROCHLORIDE 50 MG/1
50-100 TABLET, FILM COATED ORAL
Qty: 45 TABLET | Refills: 1 | Status: SHIPPED | OUTPATIENT
Start: 2018-02-05 | End: 2018-04-03

## 2018-02-05 RX ORDER — HYDROCODONE BITARTRATE AND ACETAMINOPHEN 5; 325 MG/1; MG/1
1-2 TABLET ORAL EVERY 8 HOURS PRN
Qty: 75 TABLET | Refills: 0 | Status: SHIPPED | OUTPATIENT
Start: 2018-02-05 | End: 2018-04-03

## 2018-02-05 ASSESSMENT — PAIN SCALES - GENERAL: PAINLEVEL: NO PAIN (1)

## 2018-02-05 NOTE — PATIENT INSTRUCTIONS
- Trial of Trazodone - 1/2 to 2 tablets at night for sleep, take 30 min before bed     - Do not take Trazodone within 3 hours of pain medication     - Recheck 1 month

## 2018-02-05 NOTE — NURSING NOTE
"Chief Complaint   Patient presents with     Back Pain     Sleep Problem     Panel Management     mychart, height, pcv, flu, fall risk, honoring choices       Initial /66  Pulse 88  Temp 99  F (37.2  C) (Temporal)  Resp 16  Ht 5' 7.52\" (1.715 m)  Wt 166 lb 6.4 oz (75.5 kg)  SpO2 99%  BMI 25.66 kg/m2 Estimated body mass index is 25.66 kg/(m^2) as calculated from the following:    Height as of this encounter: 5' 7.52\" (1.715 m).    Weight as of this encounter: 166 lb 6.4 oz (75.5 kg).  Medication Reconciliation: complete  "

## 2018-02-05 NOTE — MR AVS SNAPSHOT
"              After Visit Summary   2/5/2018    Luther Kam    MRN: [id number]           Patient Information     Date Of Birth          1935        Visit Information        Provider Department      2/5/2018 11:00 AM Sarah Hickey PA-C [facility]        Today's Diagnoses     Chronic bilateral low back pain without sciatica    -  1    Urinary frequency        Psychophysiological insomnia          Care Instructions      - Trial of Trazodone - 1/2 to 2 tablets at night for sleep, take 30 min before bed     - Do not take Trazodone within 3 hours of pain medication     - Recheck 1 month           Follow-ups after your visit        Who to contact     If you have questions or need follow up information about today's clinic visit or your schedule please contact [facility] directly at [phone].  Normal or non-critical lab and imaging results will be communicated to you by [employer], letter or phone within 4 business days after the clinic has received the results. If you do not hear from us within 7 days, please contact the clinic through [employer] or phone. If you have a critical or abnormal lab result, we will notify you by phone as soon as possible.  Submit refill requests through [employer] or call your pharmacy and they will forward the refill request to us. Please allow 3 business days for your refill to be completed.          Additional Information About Your Visit        MyChart Information     [employer] lets you send messages to your doctor, view your test results, renew your prescriptions, schedule appointments and more. To sign up, go to [email or web address] . Click on \"Log in\" on the left side of the screen, which will take you to the Welcome page. Then click on \"Sign up Now\" on the right side of the page.     You will be asked to enter the access code listed below, as well as some personal information. Please follow the directions to create your username and " "password.     Your access code is: [email or web address]  Expires: 2018 11:42 AM     Your access code will  in 90 days. If you need help or a new code, please call your [place] clinic or [phone].        Care EveryWhere ID     This is your Care EveryWhere ID. This could be used by other organizations to access your [place] medical records  [id number]        Your Vitals Were     Pulse Temperature Respirations Height Pulse Oximetry BMI (Body Mass Index)    88 99  F (37.2  C) (Temporal) 16 5' 7.52\" (1.715 m) 99% 25.66 kg/m2       Blood Pressure from Last 3 Encounters:   18 110/66   10/06/17 138/80   17 124/70    Weight from Last 3 Encounters:   18 166 lb 6.4 oz (75.5 kg)   10/26/17 170 lb (77.1 kg)   10/06/17 170 lb (77.1 kg)              Today, you had the following     No orders found for display         Today's Medication Changes          These changes are accurate as of 18 11:42 AM.  If you have any questions, ask your nurse or doctor.               Start taking these medicines.        Dose/Directions    traZODone 50 MG tablet   Commonly known as:  DESYREL   Used for:  Psychophysiological insomnia   Started by:  Sarah Hickey PA-C        Dose:   mg   Take 1-2 tablets ( mg) by mouth nightly as needed for sleep   Quantity:  45 tablet   Refills:  1            Where to get your medicines      These medications were sent to [facility] [address] [place] [address] [address] [address]     Phone:  [phone]     tamsulosin 0.4 MG capsule    traZODone 50 MG tablet         Some of these will need a paper prescription and others can be bought over the counter.  Ask your nurse if you have questions.     Bring a paper prescription for each of these medications     HYDROcodone-acetaminophen 5-325 MG per tablet                Primary Care Provider Office Phone # Fax #    Sarah Hickey PA-C [phone] "       [address] [facility] [address]  [facility] [address]        Equal Access to Services     DANII LEE : Hadii aad ku hadleonorgian Somaddie, wabrandeeda chandler, qaybta kaangelicasolitario mccolulm, elke marie. So [facility] [phone].    ATENCIÓN: Si habla español, tiene a coyne disposición servicios gratuitos de asistencia lingüística. [facility] [phone].    We comply with applicable federal civil rights laws and [place] laws. We do not discriminate on the basis of race, color, national origin, age, disability, sex, sexual orientation, or gender identity.            Thank you!     Thank you for choosing [facility]  for your care. Our goal is always to provide you with excellent care. Hearing back from our patients is one way we can continue to improve our services. Please take a few minutes to complete the written survey that you may receive in the mail after your visit with us. Thank you!             Your Updated Medication List - Protect others around you: Learn how to safely use, store and throw away your medicines at www.disposemymeds.org.          This list is accurate as of 2/5/18 11:43 AM.  Always use your most recent med list.                   Brand Name Dispense Instructions for use Diagnosis    HYDROcodone-acetaminophen 5-325 MG per tablet    NORCO    75 tablet    Take 1-2 tablets by mouth every 8 hours as needed for moderate to severe pain (Max 4 per day)        metroNIDAZOLE 0.75 % topical gel    METROGEL     Apply topically twice per day as needed        tamsulosin 0.4 MG capsule    FLOMAX    30 capsule    Take 1 capsule (0.4 mg) by mouth daily    Urinary frequency       traZODone 50 MG tablet    DESYREL    45 tablet    Take 1-2 tablets ( mg) by mouth nightly as needed for sleep    Psychophysiological insomnia       triamcinolone 0.1 % cream    KENALOG    80 g    Apply sparingly to affected area three times daily as needed    Skin lesion of right leg

## 2018-02-20 ENCOUNTER — OFFICE VISIT (OUTPATIENT)
Dept: PODIATRY | Facility: OTHER | Age: 83
End: 2018-02-20
Payer: COMMERCIAL

## 2018-02-20 VITALS — TEMPERATURE: 97.5 F | BODY MASS INDEX: 24.86 KG/M2 | WEIGHT: 164 LBS | HEIGHT: 68 IN

## 2018-02-20 DIAGNOSIS — M20.41 HAMMER TOES OF BOTH FEET: Primary | ICD-10-CM

## 2018-02-20 DIAGNOSIS — L84 HELOMA MOLLE: ICD-10-CM

## 2018-02-20 DIAGNOSIS — M20.42 HAMMER TOES OF BOTH FEET: Primary | ICD-10-CM

## 2018-02-20 PROCEDURE — 11055 PARING/CUTG B9 HYPRKER LES 1: CPT | Performed by: PODIATRIST

## 2018-02-20 PROCEDURE — 99203 OFFICE O/P NEW LOW 30 MIN: CPT | Mod: 25 | Performed by: PODIATRIST

## 2018-02-20 ASSESSMENT — PAIN SCALES - GENERAL: PAINLEVEL: MILD PAIN (3)

## 2018-02-20 NOTE — MR AVS SNAPSHOT
"              After Visit Summary   2/20/2018    Luther Kam    MRN: [id number]           Patient Information     Date Of Birth          1935        Visit Information        Provider Department      2/20/2018 1:00 PM Anil Gerard DPM [facility] Instructions    Nail Debridement    A high quality instrument makes trimming toenails MUCH easier.  Search ebay for any 5\" nail nipper manufactured by reliable brands such as Miltex, Integra or Jarit as these quality instruments will help manage difficult nails more effectively and comfortably. We use Miltex -SS.  A physician is not necessary to trim nails even if you are taking blood thinners or are diabetic.  Your family or care givers may help manage your toenails.      Trim or sand the nails once weekly.  Do not wait until they are long and painful or trimming will become too difficult and painful and will increase your risk of complications or infection.  A course file or 120 grit sandpaper on a sanding block can be helpful.  For very thick nails many people prefer battery operated lu such as an [employer]', Personal Pedi and Emjoi for regular use or heavy painful callouses or thick toenails.    Trim or skive any portion of nail that is thick, loose, crumbling, or not well attached. Do not tear the nail away, but rather cut them with a nail nipperor sand or sand them down.  You may follow up with your Podiatric Physician if you have pain, bleeding, infection, questions or other concerns.      You may also contact the following Registered Nurses for further help with nail debridement and minor hygiene concnerns.  They may come to your home or meet them at their clinic to trim your toenails and soak your feet, as well as monitor for any complications that would require evaluation by a Physician.        [facility]  Jennifer Adams, RN  Office [phone]    [facility]  Palma Handy, " RN  [phone]   Call or text for appointment  Some home visits and has a clinic at:  [address] [address]    [facility]  [phone]  [facility]  [place]    [facility]  [phone]  [email or web address]  [facility]    For up to date list and to find foot care nurses in other communities visit American Foot Care Nurses Association website:  afcna.org.         Calluses, Corns, IPKs, Porokeratosis    When there is excessive friction or pressure on the skin, the body responds by making the skin thicker.  While this may protect the deeper structures, the thickened skin can take up more space and thus increase pressure over a bony prominence or become an open sore or skin ulcer as this skin becomes less flexible.    Flat, diffuse thickening are simple calluses and they are usually caused by friction.  Often these are the result of rubbing on a shoe or going barefoot.    Calluses with a central core between the toes are called corns.  These often result from prominent joints on adjacent toes rubbing together.  Theses are often a symptom of bone malalignment and will usually recur unless the underlying bones are addressed.    Many of these lesions can be kept comfortable with routine maintenance. This consists of filing them with a Ped Egg, callus file, or 120 grit sandpaper on a block, every day during your bath or shower.  Most people prefer battery operated lu such as an Amope', Personal Pedi and Emjoi for regular use or heavy painful callouses.  Heavy creams or ointments can be applied 1-2 times every day to keep them soft. Toe spacers can be used for corns, gel pads can be used for other lesions on the bottom of the foot. If there is a deformity noted, such as a prominent bone, often this can be addressed to minimize recurrence. However, sometimes the pressure and lesion simply migrates to another spot after surgery, so it is not a guaranteed cure.  "    If you have severe callouses and cracking, you may apply heavy greasy ointments that you scoop up such as Cetaphil, Eucerin, Aquaphor or Vaseline.  For more aggressive help apply heavy creams or ointment under occlusive dressings such as Saran Wrap or Jelly Feet while sleeping.   Jelly Feet can be obtained at [email or web address].     To be successful with managing hyperkeratotic skin, you must manage hygiene daily.  At your bath or shower time is the easiest time to work on this when skin is most soft.  There is no medical or surgical treatment that will absolutely eliminate many of these symptoms.      Pedifix is a reliable source for all sorts of foot pads, cushions, or interdigital spacers and foot appliances. Go to [email or web address] Mobile or request a catalog at 3-626-Zapoint.        Please call with any additional questions.             Follow-ups after your visit        Who to contact     If you have questions or need follow up information about today's clinic visit or your schedule please contact [facility] directly at [phone].  Normal or non-critical lab and imaging results will be communicated to you by Lorena Gaxiolahart, letter or phone within 4 business days after the clinic has received the results. If you do not hear from us within 7 days, please contact the clinic through [employer] or phone. If you have a critical or abnormal lab result, we will notify you by phone as soon as possible.  Submit refill requests through [email or web address] or call your pharmacy and they will forward the refill request to us. Please allow 3 business days for your refill to be completed.          Additional Information About Your Visit        Lorena GaxiolaharCityNews Information     [email or web address] lets you send messages to your doctor, view your test results, renew your prescriptions, schedule appointments and more. To sign up, go to [email or web address] . Click on \"Log in\" on the left side of the screen, which will take you to the Welcome page. Then " "click on \"Sign up Now\" on the right side of the page.     You will be asked to enter the access code listed below, as well as some personal information. Please follow the directions to create your username and password.     Your access code is: [email or web address]  Expires: 2018 11:42 AM     Your access code will  in 90 days. If you need help or a new code, please call your [place] clinic or [phone].        Care EveryWhere ID     This is your Care EveryWhere ID. This could be used by other organizations to access your [place] medical records  [id number]        Your Vitals Were     Temperature Height BMI (Body Mass Index)             97.5  F (36.4  C) (Temporal) 5' 7.52\" (1.715 m) 25.29 kg/m2          Blood Pressure from Last 3 Encounters:   18 110/66   10/06/17 138/80   17 124/70    Weight from Last 3 Encounters:   18 164 lb (74.4 kg)   18 166 lb 6.4 oz (75.5 kg)   10/26/17 170 lb (77.1 kg)              Today, you had the following     No orders found for display       Primary Care Provider Office Phone # Fax #    Sarah CONOR Hickey PA-C [phone]       [address] [facility] [address]  [facility] [address]        Equal Access to Services     [facility]: Hadii aad ku hadasho Soomaali, waaxda luqadaha, qaybta kaalmada adeegyada, elke freeman . So [facility] [phone].    ATENCIÓN: Si habla español, tiene a coyne disposición servicios gratuitos de asistencia lingüística. Stacia al [phone].    We comply with applicable federal civil rights laws and [place] laws. We do not discriminate on the basis of race, color, national origin, age, disability, sex, sexual orientation, or gender identity.            Thank you!     Thank you for choosing [facility]  for your care. Our goal is always to provide you with excellent care. Hearing back from our patients is one way we can continue to improve our services. Please take a few " minutes to complete the written survey that you may receive in the mail after your visit with us. Thank you!             Your Updated Medication List - Protect others around you: Learn how to safely use, store and throw away your medicines at www.disposemymeds.org.          This list is accurate as of 2/20/18  1:32 PM.  Always use your most recent med list.                   Brand Name Dispense Instructions for use Diagnosis    HYDROcodone-acetaminophen 5-325 MG per tablet    NORCO    75 tablet    Take 1-2 tablets by mouth every 8 hours as needed for moderate to severe pain (Max 4 per day)        metroNIDAZOLE 0.75 % topical gel    METROGEL     Apply topically twice per day as needed        tamsulosin 0.4 MG capsule    FLOMAX    30 capsule    Take 1 capsule (0.4 mg) by mouth daily    Urinary frequency       traZODone 50 MG tablet    DESYREL    45 tablet    Take 1-2 tablets ( mg) by mouth nightly as needed for sleep    Psychophysiological insomnia       triamcinolone 0.1 % cream    KENALOG    80 g    Apply sparingly to affected area three times daily as needed    Skin lesion of right leg

## 2018-02-20 NOTE — NURSING NOTE
"Chief Complaint   Patient presents with     Consult     callus medial Right 5th toe; new pt       Initial Temp 97.5  F (36.4  C) (Temporal)  Ht 5' 7.52\" (1.715 m)  Wt 164 lb (74.4 kg)  BMI 25.29 kg/m2 Estimated body mass index is 25.29 kg/(m^2) as calculated from the following:    Height as of this encounter: 5' 7.52\" (1.715 m).    Weight as of this encounter: 164 lb (74.4 kg).  BP completed using cuff size: NA (Not Taken)  Medication Reconciliation: complete    Indira Carranza CMA, February 20, 2018    "

## 2018-02-20 NOTE — LETTER
2/20/2018         RE: Luther Kam  [address] [facility] [address]        Dear Colleague,    Thank you for referring your patient, Luther Kam, to the [facility]. Please see a copy of my visit note below.    HPI:  Luther Kam is a 83 year old male who is seen in consultation at the request of self.    Pt presents for eval of:   (Onset, Location, L/R, Character, Treatments, Injury if yes)     Ongoing for a few years, callus medial Right 5th toe.  With pressure, sharp, stabbing, throbbing, redness, pain 3  Tube foam, toe spacer, soaking    Active and Retired.    BMI is normal.      ROS:  10 point ROS neg other than the symptoms noted above in the HPI.    PAST MEDICAL HISTORY:   Past Medical History:   Diagnosis Date     Irritable bowel syndrome      Rosacea         PAST SURGICAL HISTORY:   Past Surgical History:   Procedure Laterality Date     HC HEMORRHOIDECT EXTERNAL, COMPLETE  1962     HC REPAIR ING HERNIA,5+Y/O,REDUCIBL  2007    right        MEDICATIONS:   Current Outpatient Prescriptions:      HYDROcodone-acetaminophen (NORCO) 5-325 MG per tablet, Take 1-2 tablets by mouth every 8 hours as needed for moderate to severe pain (Max 4 per day), Disp: 75 tablet, Rfl: 0     tamsulosin (FLOMAX) 0.4 MG capsule, Take 1 capsule (0.4 mg) by mouth daily, Disp: 30 capsule, Rfl: 5     traZODone (DESYREL) 50 MG tablet, Take 1-2 tablets ( mg) by mouth nightly as needed for sleep, Disp: 45 tablet, Rfl: 1     triamcinolone (KENALOG) 0.1 % cream, Apply sparingly to affected area three times daily as needed, Disp: 80 g, Rfl: 0     metroNIDAZOLE (METROGEL) 0.75 % gel, Apply topically twice per day as needed, Disp: , Rfl:      ALLERGIES:  No Known Allergies     SOCIAL HISTORY:   Social History     Social History     Marital status:      Spouse name: N/A     Number of children: N/A     Years of education: N/A     Occupational History     Not on file.     Social History Main Topics      "Smoking status: Former Smoker     Packs/day: 0.50     Types: Cigarettes     Smokeless tobacco: Never Used      Comment: socially, not daily     Alcohol use Yes      Comment: 3-4 day to rare     Drug use: No     Sexual activity: Not Currently     Partners: Female      Comment: no bc     Other Topics Concern     Not on file     Social History Narrative        FAMILY HISTORY:   Family History   Problem Relation Age of Onset     Hypertension Mother         EXAM:Vitals: Temp 97.5  F (36.4  C) (Temporal)  Ht 5' 7.52\" (1.715 m)  Wt 164 lb (74.4 kg)  BMI 25.29 kg/m2  BMI= Body mass index is 25.29 kg/(m^2).    General appearance: Patient is alert and fully cooperative with history & exam.  No sign of distress is noted during the visit.     Psychiatric: Affect is pleasant & appropriate.  Patient appears motivated to improve health.     Respiratory: Breathing is regular & unlabored while sitting.     HEENT: Hearing is intact to spoken word.  Speech is clear.  No gross evidence of visual impairment that would impact ambulation.     Vascular: DP & PT pulses are intact & regular bilaterally.  No significant edema or varicosities noted.  CFT and skin temperature is normal to both lower extremities.     Neurologic: Lower extremity sensation is intact to light touch.  No evidence of weakness or contracture in the lower extremities.  No evidence of neuropathy.    Dermatologic: Reasonable texture turgor tone about the integument.  2 cm macerated hyperkeratotic lesion is noted about the proximal fourth interdigital space right foot.  Dry homogenous exudate noted throughout.  Upon debridement no full-thickness ulceration is noted.    Musculoskeletal: Patient is ambulatory without assistive device or brace.  Rigid contracture of the lesser toes noted.  Right fifth toe is very rigidly contracted.  So is the fourth.     ASSESSMENT:       ICD-10-CM    1. Hammer toes of both feet M20.41     M20.42    2. Heloma molle L84         PLAN:  " Reviewed patient's chart in [facility].      2/20/2018   I debrided 1 symptomatic lesion sharply with a 15 blade to dermis.  No dressing applied.  Recommended interdigital spacers and abrasive debridement at home.  Recommended wide shoes.  Discussed how to obtain interdigital spacers and manage this at home.  If this is not adequate I would recommend follow-up with radiographs and can consider surgical reconstruction.  This might include arthroplasty versus syndactylization.    Anil Gerard DPM        Again, thank you for allowing me to participate in the care of your patient.        Sincerely,        Anil Gerard DPM

## 2018-02-20 NOTE — PROGRESS NOTES
HPI:  Luther Kam is a 83 year old male who is seen in consultation at the request of self.    Pt presents for eval of:   (Onset, Location, L/R, Character, Treatments, Injury if yes)     Ongoing for a few years, callus medial Right 5th toe.  With pressure, sharp, stabbing, throbbing, redness, pain 3  Tube foam, toe spacer, soaking    Active and Retired.    BMI is normal.      ROS:  10 point ROS neg other than the symptoms noted above in the HPI.    PAST MEDICAL HISTORY:   Past Medical History:   Diagnosis Date     Irritable bowel syndrome      Rosacea         PAST SURGICAL HISTORY:   Past Surgical History:   Procedure Laterality Date     HC HEMORRHOIDECT EXTERNAL, COMPLETE  1962     HC REPAIR ING HERNIA,5+Y/O,REDUCIBL  2007    right        MEDICATIONS:   Current Outpatient Prescriptions:      HYDROcodone-acetaminophen (NORCO) 5-325 MG per tablet, Take 1-2 tablets by mouth every 8 hours as needed for moderate to severe pain (Max 4 per day), Disp: 75 tablet, Rfl: 0     tamsulosin (FLOMAX) 0.4 MG capsule, Take 1 capsule (0.4 mg) by mouth daily, Disp: 30 capsule, Rfl: 5     traZODone (DESYREL) 50 MG tablet, Take 1-2 tablets ( mg) by mouth nightly as needed for sleep, Disp: 45 tablet, Rfl: 1     triamcinolone (KENALOG) 0.1 % cream, Apply sparingly to affected area three times daily as needed, Disp: 80 g, Rfl: 0     metroNIDAZOLE (METROGEL) 0.75 % gel, Apply topically twice per day as needed, Disp: , Rfl:      ALLERGIES:  No Known Allergies     SOCIAL HISTORY:   Social History     Social History     Marital status:      Spouse name: N/A     Number of children: N/A     Years of education: N/A     Occupational History     Not on file.     Social History Main Topics     Smoking status: Former Smoker     Packs/day: 0.50     Types: Cigarettes     Smokeless tobacco: Never Used      Comment: socially, not daily     Alcohol use Yes      Comment: 3-4 day to rare     Drug use: No     Sexual activity: Not Currently      "Partners: Female      Comment: no bc     Other Topics Concern     Not on file     Social History Narrative        FAMILY HISTORY:   Family History   Problem Relation Age of Onset     Hypertension Mother         EXAM:Vitals: Temp 97.5  F (36.4  C) (Temporal)  Ht 5' 7.52\" (1.715 m)  Wt 164 lb (74.4 kg)  BMI 25.29 kg/m2  BMI= Body mass index is 25.29 kg/(m^2).    General appearance: Patient is alert and fully cooperative with history & exam.  No sign of distress is noted during the visit.     Psychiatric: Affect is pleasant & appropriate.  Patient appears motivated to improve health.     Respiratory: Breathing is regular & unlabored while sitting.     HEENT: Hearing is intact to spoken word.  Speech is clear.  No gross evidence of visual impairment that would impact ambulation.     Vascular: DP & PT pulses are intact & regular bilaterally.  No significant edema or varicosities noted.  CFT and skin temperature is normal to both lower extremities.     Neurologic: Lower extremity sensation is intact to light touch.  No evidence of weakness or contracture in the lower extremities.  No evidence of neuropathy.    Dermatologic: Reasonable texture turgor tone about the integument.  2 cm macerated hyperkeratotic lesion is noted about the proximal fourth interdigital space right foot.  Dry homogenous exudate noted throughout.  Upon debridement no full-thickness ulceration is noted.    Musculoskeletal: Patient is ambulatory without assistive device or brace.  Rigid contracture of the lesser toes noted.  Right fifth toe is very rigidly contracted.  So is the fourth.     ASSESSMENT:       ICD-10-CM    1. Hammer toes of both feet M20.41     M20.42    2. Heloma molle L84         PLAN:  Reviewed patient's chart in [facility].      2/20/2018   I debrided 1 symptomatic lesion sharply with a 15 blade to dermis.  No dressing applied.  Recommended interdigital spacers and abrasive debridement at home.  Recommended wide shoes.  Discussed how " to obtain interdigital spacers and manage this at home.  If this is not adequate I would recommend follow-up with radiographs and can consider surgical reconstruction.  This might include arthroplasty versus syndactylization.    Anil Gerard DPM

## 2018-02-20 NOTE — PATIENT INSTRUCTIONS
"Nail Debridement    A high quality instrument makes trimming toenails MUCH easier.  Search ebay for any 5\" nail nipper manufactured by reliable brands such as Miltex, Integra or Jarit as these quality instruments will help manage difficult nails more effectively and comfortably. We use Miltex -SS.  A physician is not necessary to trim nails even if you are taking blood thinners or are diabetic.  Your family or care givers may help manage your toenails.      Trim or sand the nails once weekly.  Do not wait until they are long and painful or trimming will become too difficult and painful and will increase your risk of complications or infection.  A course file or 120 grit sandpaper on a sanding block can be helpful.  For very thick nails many people prefer battery operated lu such as an Amope', Personal Pedi and Emjoi for regular use or heavy painful callouses or thick toenails.    Trim or skive any portion of nail that is thick, loose, crumbling, or not well attached. Do not tear the nail away, but rather cut them with a nail nipperor sand or sand them down.  You may follow up with your Podiatric Physician if you have pain, bleeding, infection, questions or other concerns.      You may also contact the following Registered Nurses for further help with nail debridement and minor hygiene concnerns.  They may come to your home or meet them at their clinic to trim your toenails and soak your feet, as well as monitor for any complications that would require evaluation by a Physician.        [facility]  Jennifer Adams RN  Office [phone]    [facility]  Palma Handy RN  [phone]   Call or text for appointment  Some home visits and has a clinic at:  [address] [place] [address]    [facility]  [phone]  [facility]  [phone]  [email or web address]  [facility]    For up to date list and " to find foot care nurses in other communities visit American Foot Care Nurses Association website:  afcna.org.         Calluses, Corns, IPKs, Porokeratosis    When there is excessive friction or pressure on the skin, the body responds by making the skin thicker.  While this may protect the deeper structures, the thickened skin can take up more space and thus increase pressure over a bony prominence or become an open sore or skin ulcer as this skin becomes less flexible.    Flat, diffuse thickening are simple calluses and they are usually caused by friction.  Often these are the result of rubbing on a shoe or going barefoot.    Calluses with a central core between the toes are called corns.  These often result from prominent joints on adjacent toes rubbing together.  Theses are often a symptom of bone malalignment and will usually recur unless the underlying bones are addressed.    Many of these lesions can be kept comfortable with routine maintenance. This consists of filing them with a Ped Egg, callus file, or 120 grit sandpaper on a block, every day during your bath or shower.  Most people prefer battery operated lu such as an Amope', Personal Pedi and Emjoi for regular use or heavy painful callouses.  Heavy creams or ointments can be applied 1-2 times every day to keep them soft. Toe spacers can be used for corns, gel pads can be used for other lesions on the bottom of the foot. If there is a deformity noted, such as a prominent bone, often this can be addressed to minimize recurrence. However, sometimes the pressure and lesion simply migrates to another spot after surgery, so it is not a guaranteed cure.     If you have severe callouses and cracking, you may apply heavy greasy ointments that you scoop up such as Cetaphil, Eucerin, Aquaphor or Vaseline.  For more aggressive help apply heavy creams or ointment under occlusive dressings such as Saran Wrap or Jelly Feet while sleeping.   Jelly Feet can be  obtained at [email or web address].     To be successful with managing hyperkeratotic skin, you must manage hygiene daily.  At your bath or shower time is the easiest time to work on this when skin is most soft.  There is no medical or surgical treatment that will absolutely eliminate many of these symptoms.      Pedifix is a reliable source for all sorts of foot pads, cushions, or interdigital spacers and foot appliances. Go to www.[email or web address] or request a catalog at 8-922-WriteReader ApS.        Please call with any additional questions.

## 2018-03-27 NOTE — PROGRESS NOTES
"  SUBJECTIVE:   Luther Kam is a 83 year old male who presents to clinic today for the following health issues:    HPI  Medication Followup of Flomax    Taking Medication as prescribed: No    Side Effects:  None    Medication Helping Symptoms:  Yes    - Somewhat better   - Doesn't always take it        Medication Followup of Trazodone     Taking Medication as prescribed: yes    Side Effects:  None    Medication Helping Symptoms:  Yes    - Sleeping better   - Mostly awake due to back pain   - Just 1 tablet          Back Pain Follow Up    Description:   Location of pain:  bilateral  Character of pain: sharp  Pain radiation: Does not radiate  Since last visit, pain is:  unchanged  New numbness or weakness in legs, not attributed to pain:  no     Intensity: Currently 1/10 sitting    History:   Pain interferes with job: Not applicable  Therapies tried without relief:   Therapies tried with relief: heat, opioids and rest     - <1 per day of pain medication   - Has 4-5 left from last rx     - Monitoring /80's at sores   - Takes twice a week     Plan from last visit 2/5/18  1. Back pain   - Stable, slight improved since stopping brace and fractures had healed   - Using Norco occasionally, 0-2/day, never later than 2 pm   - Reviewed use and side effects  - Refill given, #75 lasts many months, OK for refill if needed   -  reviewed, no concerns    - Recheck every 3 months      2. Urinary frequency   - Patient did not understand to take this consistently, not PRN   - Reviewed use and side effects  - Patient to restart as this can be part of issue with his sleep   - Recheck 1 month      3. Insomnia   - Issues with sleep initiation mostly, admits to being \"a worrier, can't turn mind off\" but can't rule out issues with his sleep due to urination as above or back pain (which patient declined, saying it's only when he is up and about)   - Will do trial of Trazodone, discussed use and side effects including sedation and " "grogginess   - Patient should not take this within 3 hours of Norco, discussed risks of oversedation if mixes these 2   - Recheck 1 month          Problem list and histories reviewed & adjusted, as indicated.  Additional history: as documented    ROS:  Constitutional, HEENT, cardiovascular, pulmonary, gi and gu systems are negative, except as otherwise noted.    OBJECTIVE:   /86 (BP Location: Right arm, Patient Position: Chair, Cuff Size: Adult Regular)  Pulse 74  Temp 97.9  F (36.6  C) (Oral)  Resp 16  Ht 5' 7.56\" (1.716 m)  Wt 162 lb (73.5 kg)  SpO2 98%  BMI 24.95 kg/m2  Body mass index is 24.95 kg/(m^2).  GENERAL APPEARANCE: healthy, alert and no distress  EYES: Eyes grossly normal to inspection, PERRLA, conjunctivae and sclerae without injection or discharge, EOM intact   RESP: Lungs clear to auscultation - no rales, rhonchi or wheezes    CV: Regular rates and rhythm, normal S1 S2, no S3 or S4, no murmur, click or rub, no peripheral edema and peripheral pulses strong and symmetric bilaterally   MS: No musculoskeletal defects are noted and gait is age appropriate without ataxia   SKIN: No suspicious lesions or rashes, hydration status appears adeuqate with normal skin turgor   PSYCH: Alert and oriented x3; speech- coherent , normal rate and volume; able to articulate logical thoughts, able to abstract reason, no tangential thoughts, no hallucinations or delusions, mentation appears normal, Mood is euthymic. Affect is appropriate for this mood state and bright. Thought content is free of suicidal ideation, hallucinations, and delusions. Dress is adequate and upkept. Eye contact is good during conversation.       Diagnostic Test Results:  none     ASSESSMENT/PLAN:       ICD-10-CM    1. Chronic bilateral low back pain without sciatica M54.5 HYDROcodone-acetaminophen (NORCO) 5-325 MG per tablet    G89.29    2. Urinary frequency R35.0 tamsulosin (FLOMAX) 0.4 MG capsule   3. Psychophysiological insomnia " F51.04 traZODone (DESYREL) 50 MG tablet     1. Back pain   - Chronic for patient, recovering from compression fracture of L1   - Stable on occasional Norco use (<1 per day), also instructed on Ibuprofen use  - Reviewed both use and side effects along with top dose, patient will continue to use sparingly   - Will make small increase to 90 tablets, should last ~3 months   - Recheck 3 months   - Fall precautions discussed, patient aware, states they don't make him tired (does live alone)     2. Urinary frequency  - Improving with Flomax, reviewed use and side effects, refilled     3. Insomnia  - Improving with 1 tablet 50 mg Trazodone at night, reviewed use and side effects, refilled       - Gave router to get PCV13 and Shingles Vaccine, will get at [facility]      The patient indicates understanding of these issues and agrees with the plan.    Follow up: every 3 months for pain recheck         Sarah Hickey PA-C  [facility]

## 2018-04-03 ENCOUNTER — OFFICE VISIT (OUTPATIENT)
Dept: FAMILY MEDICINE | Facility: OTHER | Age: 83
End: 2018-04-03
Payer: COMMERCIAL

## 2018-04-03 VITALS
SYSTOLIC BLOOD PRESSURE: 144 MMHG | HEIGHT: 68 IN | OXYGEN SATURATION: 98 % | BODY MASS INDEX: 24.55 KG/M2 | RESPIRATION RATE: 16 BRPM | HEART RATE: 74 BPM | TEMPERATURE: 97.9 F | WEIGHT: 162 LBS | DIASTOLIC BLOOD PRESSURE: 86 MMHG

## 2018-04-03 DIAGNOSIS — G89.29 CHRONIC BILATERAL LOW BACK PAIN WITHOUT SCIATICA: Primary | ICD-10-CM

## 2018-04-03 DIAGNOSIS — F51.04 PSYCHOPHYSIOLOGICAL INSOMNIA: ICD-10-CM

## 2018-04-03 DIAGNOSIS — R35.0 URINARY FREQUENCY: ICD-10-CM

## 2018-04-03 DIAGNOSIS — M54.50 CHRONIC BILATERAL LOW BACK PAIN WITHOUT SCIATICA: Primary | ICD-10-CM

## 2018-04-03 PROCEDURE — 99214 OFFICE O/P EST MOD 30 MIN: CPT | Performed by: PHYSICIAN ASSISTANT

## 2018-04-03 RX ORDER — TAMSULOSIN HYDROCHLORIDE 0.4 MG/1
0.4 CAPSULE ORAL DAILY
Qty: 90 CAPSULE | Refills: 3 | Status: SHIPPED | OUTPATIENT
Start: 2018-04-03 | End: 2019-08-05

## 2018-04-03 RX ORDER — HYDROCODONE BITARTRATE AND ACETAMINOPHEN 5; 325 MG/1; MG/1
1-2 TABLET ORAL EVERY 8 HOURS PRN
Qty: 90 TABLET | Refills: 0 | Status: SHIPPED | OUTPATIENT
Start: 2018-04-03 | End: 2018-06-12

## 2018-04-03 RX ORDER — TRAZODONE HYDROCHLORIDE 50 MG/1
50 TABLET, FILM COATED ORAL
Qty: 90 TABLET | Refills: 1 | Status: SHIPPED | OUTPATIENT
Start: 2018-04-03 | End: 2019-08-05

## 2018-04-03 ASSESSMENT — PATIENT HEALTH QUESTIONNAIRE - PHQ9: 5. POOR APPETITE OR OVEREATING: SEVERAL DAYS

## 2018-04-03 ASSESSMENT — ANXIETY QUESTIONNAIRES
5. BEING SO RESTLESS THAT IT IS HARD TO SIT STILL: SEVERAL DAYS
7. FEELING AFRAID AS IF SOMETHING AWFUL MIGHT HAPPEN: SEVERAL DAYS
6. BECOMING EASILY ANNOYED OR IRRITABLE: SEVERAL DAYS
GAD7 TOTAL SCORE: 7
3. WORRYING TOO MUCH ABOUT DIFFERENT THINGS: SEVERAL DAYS
2. NOT BEING ABLE TO STOP OR CONTROL WORRYING: SEVERAL DAYS
1. FEELING NERVOUS, ANXIOUS, OR ON EDGE: SEVERAL DAYS

## 2018-04-03 NOTE — NURSING NOTE
"Chief Complaint   Patient presents with     Recheck Medication     Panel Management     mychart, prevnar, fall risk, honoring choices, phq/vesta, urine drug screen       Initial /86 (BP Location: Right arm, Patient Position: Chair, Cuff Size: Adult Regular)  Pulse 74  Temp 97.9  F (36.6  C) (Oral)  Resp 16  Ht 5' 7.56\" (1.716 m)  Wt 162 lb (73.5 kg)  SpO2 98%  BMI 24.95 kg/m2 Estimated body mass index is 24.95 kg/(m^2) as calculated from the following:    Height as of this encounter: 5' 7.56\" (1.716 m).    Weight as of this encounter: 162 lb (73.5 kg).  Medication Reconciliation: complete  "

## 2018-04-03 NOTE — MR AVS SNAPSHOT
"              After Visit Summary   4/3/2018    Luther Kam    MRN: [id number]           Patient Information     Date Of Birth          1935        Visit Information        Provider Department      4/3/2018 9:45 AM Sarah Hickey PA-C [facility]        Today's Diagnoses     Chronic bilateral low back pain without sciatica    -  1    Urinary frequency        Psychophysiological insomnia          Care Instructions    - Recheck 3 months               Follow-ups after your visit        Follow-up notes from your care team     Return in about 3 months (around 7/3/2018).      Who to contact     If you have questions or need follow up information about today's clinic visit or your schedule please contact [facility] directly at [phone].  Normal or non-critical lab and imaging results will be communicated to you by Glytherahart, letter or phone within 4 business days after the clinic has received the results. If you do not hear from us within 7 days, please contact the clinic through Glytherahart or phone. If you have a critical or abnormal lab result, we will notify you by phone as soon as possible.  Submit refill requests through Behind the Burner or call your pharmacy and they will forward the refill request to us. Please allow 3 business days for your refill to be completed.          Additional Information About Your Visit        MyChart Information     Behind the Burner lets you send messages to your doctor, view your test results, renew your prescriptions, schedule appointments and more. To sign up, go to [email or web address] . Click on \"Log in\" on the left side of the screen, which will take you to the Welcome page. Then click on \"Sign up Now\" on the right side of the page.     You will be asked to enter the access code listed below, as well as some personal information. Please follow the directions to create your username and password.     Your access code is: [email or web address]  Expires: " "2018 12:42 PM     Your access code will  in 90 days. If you need help or a new code, please call your [facility] or [phone].        Care EveryWhere ID     This is your Care EveryWhere ID. This could be used by other organizations to access your [place] medical records  [id number]        Your Vitals Were     Pulse Temperature Respirations Height Pulse Oximetry BMI (Body Mass Index)    74 97.9  F (36.6  C) (Oral) 16 5' 7.56\" (1.716 m) 98% 24.95 kg/m2       Blood Pressure from Last 3 Encounters:   18 144/86   18 110/66   10/06/17 138/80    Weight from Last 3 Encounters:   18 162 lb (73.5 kg)   18 164 lb (74.4 kg)   18 166 lb 6.4 oz (75.5 kg)              Today, you had the following     No orders found for display         Today's Medication Changes          These changes are accurate as of 4/3/18 10:06 AM.  If you have any questions, ask your nurse or doctor.               These medicines have changed or have updated prescriptions.        Dose/Directions    traZODone 50 MG tablet   Commonly known as:  DESYREL   This may have changed:  how much to take   Used for:  Psychophysiological insomnia   Changed by:  Sarah Hickey PA-C        Dose:  50 mg   Take 1 tablet (50 mg) by mouth nightly as needed for sleep   Quantity:  90 tablet   Refills:  1            Where to get your medicines      These medications were sent to [facility] [address] [place] [address] [address] [address]     Phone:  [phone]     tamsulosin 0.4 MG capsule    traZODone 50 MG tablet         Some of these will need a paper prescription and others can be bought over the counter.  Ask your nurse if you have questions.     Bring a paper prescription for each of these medications     HYDROcodone-acetaminophen 5-325 MG per tablet                Primary Care Provider Office Phone # Fax #    Sarah Hickey PA-C [phone] " [phone]       [address] [place] [address] [address]  [facility] [address]        Equal Access to Services     DANII LEE : Hadii aad ku hadleonorgian Velez, wabrandeeda luevanadaha, qaybta kaalmada josecierrasolitario, elke loralyleraj marie. So [facility] [phone].    ATENCIÓN: Si habla español, tiene a coyne disposición servicios gratuitos de asistencia lingüística. Stacia al [phone].    We comply with applicable federal civil rights laws and [place] laws. We do not discriminate on the basis of race, color, national origin, age, disability, sex, sexual orientation, or gender identity.            Thank you!     Thank you for choosing [facility]  for your care. Our goal is always to provide you with excellent care. Hearing back from our patients is one way we can continue to improve our services. Please take a few minutes to complete the written survey that you may receive in the mail after your visit with us. Thank you!             Your Updated Medication List - Protect others around you: Learn how to safely use, store and throw away your medicines at www.disposemymeds.org.          This list is accurate as of 4/3/18 10:06 AM.  Always use your most recent med list.                   Brand Name Dispense Instructions for use Diagnosis    HYDROcodone-acetaminophen 5-325 MG per tablet    NORCO    90 tablet    Take 1-2 tablets by mouth every 8 hours as needed for moderate to severe pain (Max 4 per day)    Chronic bilateral low back pain without sciatica       metroNIDAZOLE 0.75 % topical gel    METROGEL     Apply topically twice per day as needed        tamsulosin 0.4 MG capsule    FLOMAX    90 capsule    Take 1 capsule (0.4 mg) by mouth daily    Urinary frequency       traZODone 50 MG tablet    DESYREL    90 tablet    Take 1 tablet (50 mg) by mouth nightly as needed for sleep    Psychophysiological insomnia       triamcinolone 0.1 % cream    KENALOG    80 g    Apply sparingly to affected area three times daily as  needed    Skin lesion of right leg

## 2018-04-04 ASSESSMENT — PATIENT HEALTH QUESTIONNAIRE - PHQ9: SUM OF ALL RESPONSES TO PHQ QUESTIONS 1-9: 6

## 2018-04-04 ASSESSMENT — ANXIETY QUESTIONNAIRES: GAD7 TOTAL SCORE: 7

## 2018-06-12 DIAGNOSIS — M54.50 CHRONIC BILATERAL LOW BACK PAIN WITHOUT SCIATICA: ICD-10-CM

## 2018-06-12 DIAGNOSIS — G89.29 CHRONIC BILATERAL LOW BACK PAIN WITHOUT SCIATICA: ICD-10-CM

## 2018-06-12 RX ORDER — HYDROCODONE BITARTRATE AND ACETAMINOPHEN 5; 325 MG/1; MG/1
1-2 TABLET ORAL EVERY 8 HOURS PRN
Qty: 90 TABLET | Refills: 0 | Status: SHIPPED | OUTPATIENT
Start: 2018-06-12 | End: 2018-08-24

## 2018-06-12 NOTE — TELEPHONE ENCOUNTER
Per our agreement, ok for refill, sporadic use.     Rx signed and given to  staff.     Ruddy Hickey PA-C  [facility]

## 2018-06-12 NOTE — TELEPHONE ENCOUNTER
Reason for Call:  Medication or medication refill:    Do you use a [facility]?  Name of the pharmacy and phone number for the current request:  [facility] - [phone]    Name of the medication requested:  hydrocodone    Other request:  Patient presents to clinic  requesting a refill of hydrocodone.  He said he will stop back in about an hour to see if its ready to .      Can we leave a detailed message on this number? YES    Phone number patient can be reached at: Home number on file [phone] (home)    Best Time: any    Call taken on 6/12/2018 at 11:52 AM by Jeimy Rucker

## 2018-08-21 NOTE — PROGRESS NOTES
SUBJECTIVE:   Luther Kam is a 83 year old male who presents to clinic today for the following health issues:    HPI  Back Pain Follow Up      Description:   Location of pain:  bilateral  Character of pain: sharp and dull ache  Pain radiation: Does not radiate  Since last visit, pain is:  unchanged  New numbness or weakness in legs, not attributed to pain:  no     Intensity: Currently 3/10    History:   Pain interferes with job: Not applicable  Therapies tried without relief: all  Therapies tried with relief: heat and opioids       Monitoring BP at stores   - Gave up alcohol and took care of problem      7/9/18 - 113/80     8/2/18 - 115/82     8/4/18 - 118/80     8/7/18 - 123/80     8/8/18 - 118/75     8/21/18 - 115/79      Swelling much improved as well     90% of time wears compression socks       - Sleeping well on trazodone     Doesn't even take every night, still sleeping well     No side effects       Problem list and histories reviewed & adjusted, as indicated.  Additional history: as documented    BP Readings from Last 3 Encounters:   04/03/18 144/86   02/05/18 110/66   10/06/17 138/80    Wt Readings from Last 3 Encounters:   04/03/18 162 lb (73.5 kg)   02/20/18 164 lb (74.4 kg)   02/05/18 166 lb 6.4 oz (75.5 kg)            Labs reviewed in EPIC    ROS:  Constitutional, HEENT, cardiovascular, pulmonary, gi and gu systems are negative, except as otherwise noted.    OBJECTIVE:   /78  Pulse 84  Temp 98  F (36.7  C) (Temporal)  Resp 18  Wt 158 lb 9.6 oz (71.9 kg)  SpO2 99%  BMI 24.43 kg/m2  Body mass index is 24.43 kg/(m^2).  GENERAL APPEARANCE: healthy, alert and no distress  EYES: Eyes grossly normal to inspection, PERRLA, conjunctivae and sclerae without injection or discharge, EOM intact   RESP: Lungs clear to auscultation - no rales, rhonchi or wheezes    CV: Regular rates and rhythm, normal S1 S2, no S3 or S4, no murmur, click or rub, no peripheral edema and peripheral pulses strong and  symmetric bilaterally   MS: No musculoskeletal defects are noted and gait is age appropriate without ataxia   SKIN: No suspicious lesions or rashes, hydration status appears adeuqate with normal skin turgor   PSYCH: Alert and oriented x3; speech- coherent , normal rate and volume; able to articulate logical thoughts, able to abstract reason, no tangential thoughts, no hallucinations or delusions, mentation appears normal, Mood is euthymic. Affect is appropriate for this mood state and bright. Thought content is free of suicidal ideation, hallucinations, and delusions. Dress is adequate and upkept. Eye contact is good during conversation.       Diagnostic Test Results:  none     ASSESSMENT/PLAN:       ICD-10-CM    1. Chronic bilateral low back pain without sciatica M54.5 HYDROcodone-acetaminophen (NORCO) 5-325 MG per tablet    G89.29    2. Elevated blood pressure reading without diagnosis of hypertension R03.0    3. Localized edema R60.0    4. Psychophysiological insomnia F51.04      1. Back pain   - Chronic for patient, recovering from compression fracture of L1   - Stable on occasional Norco use (<1 per day), also instructed on Ibuprofen use  - Reviewed both use and side effects along with top dose, patient will continue to use sparingly, may cause sedation, addiction, and constipation, no driving on this medication   - Recheck every 3 months   - Fall precautions discussed, patient aware, states they don't make him tired (does live alone)   -  reviewed, last fill 6/12/18  - CSA discussed and signed today (8/24/18)      Hydrocodone-APAP 5-325 mg, #90/3 months, <1 per day     2 & 3.   - Much improved since patient stopped drinking 3 months ago   - Not on any medication at this time   - Continue to monitor BP reviewed goal and when to come in (if consistently >130/80)   - Continue to wear compression socks     4. Insomnia   - Stable on occasional Trazodone use   - Reviewed use and side effects, no refills needed  today   - OK for refills if needed     The patient indicates understanding of these issues and agrees with the plan.    Follow up: pain recheck every 3 months         Sarah Hickey PA-C  [facility]

## 2018-08-24 ENCOUNTER — OFFICE VISIT (OUTPATIENT)
Dept: FAMILY MEDICINE | Facility: OTHER | Age: 83
End: 2018-08-24
Payer: COMMERCIAL

## 2018-08-24 VITALS
SYSTOLIC BLOOD PRESSURE: 124 MMHG | OXYGEN SATURATION: 99 % | WEIGHT: 158.6 LBS | RESPIRATION RATE: 18 BRPM | DIASTOLIC BLOOD PRESSURE: 78 MMHG | BODY MASS INDEX: 24.43 KG/M2 | TEMPERATURE: 98 F | HEART RATE: 84 BPM

## 2018-08-24 DIAGNOSIS — R60.0 LOCALIZED EDEMA: ICD-10-CM

## 2018-08-24 DIAGNOSIS — R03.0 ELEVATED BLOOD PRESSURE READING WITHOUT DIAGNOSIS OF HYPERTENSION: ICD-10-CM

## 2018-08-24 DIAGNOSIS — F51.04 PSYCHOPHYSIOLOGICAL INSOMNIA: ICD-10-CM

## 2018-08-24 DIAGNOSIS — G89.29 CHRONIC BILATERAL LOW BACK PAIN WITHOUT SCIATICA: Primary | ICD-10-CM

## 2018-08-24 DIAGNOSIS — M54.50 CHRONIC BILATERAL LOW BACK PAIN WITHOUT SCIATICA: Primary | ICD-10-CM

## 2018-08-24 PROCEDURE — 99214 OFFICE O/P EST MOD 30 MIN: CPT | Performed by: PHYSICIAN ASSISTANT

## 2018-08-24 RX ORDER — HYDROCODONE BITARTRATE AND ACETAMINOPHEN 5; 325 MG/1; MG/1
1-2 TABLET ORAL EVERY 8 HOURS PRN
Qty: 90 TABLET | Refills: 0 | Status: SHIPPED | OUTPATIENT
Start: 2018-08-24 | End: 2018-11-06

## 2018-08-24 ASSESSMENT — PAIN SCALES - GENERAL: PAINLEVEL: MILD PAIN (3)

## 2018-08-24 NOTE — MR AVS SNAPSHOT
After Visit Summary   8/24/2018    Luther Kam    MRN: [id number]           Patient Information     Date Of Birth          1935        Visit Information        Provider Department      8/24/2018 11:00 AM Sarah Hickey PA-C [facility]        Today's Diagnoses     Chronic bilateral low back pain without sciatica    -  1    Elevated blood pressure reading without diagnosis of hypertension        Localized edema        Psychophysiological insomnia           Follow-ups after your visit        Follow-up notes from your care team     Return in about 3 months (around 11/24/2018).      Who to contact     If you have questions or need follow up information about today's clinic visit or your schedule please contact [facility] directly at [phone].  Normal or non-critical lab and imaging results will be communicated to you by MyChart, letter or phone within 4 business days after the clinic has received the results. If you do not hear from us within 7 days, please contact the clinic through MyChart or phone. If you have a critical or abnormal lab result, we will notify you by phone as soon as possible.  Submit refill requests through fuseSPORT or call your pharmacy and they will forward the refill request to us. Please allow 3 business days for your refill to be completed.          Additional Information About Your Visit        Care EveryWhere ID     This is your Care EveryWhere ID. This could be used by other organizations to access your [place] medical records  [id number]        Your Vitals Were     Pulse Temperature Respirations Pulse Oximetry BMI (Body Mass Index)       84 98  F (36.7  C) (Temporal) 18 99% 24.43 kg/m2        Blood Pressure from Last 3 Encounters:   08/24/18 124/78   04/03/18 144/86   02/05/18 110/66    Weight from Last 3 Encounters:   08/24/18 158 lb 9.6 oz (71.9 kg)   04/03/18 162 lb (73.5 kg)   02/20/18 164 lb (74.4 kg)               Today, you had the following     No orders found for display         Where to get your medicines      Some of these will need a paper prescription and others can be bought over the counter.  Ask your nurse if you have questions.     Bring a paper prescription for each of these medications     HYDROcodone-acetaminophen 5-325 MG per tablet         Information about OPIOIDS     PRESCRIPTION OPIOIDS: WHAT YOU NEED TO KNOW   We gave you an opioid (narcotic) pain medicine. It is important to manage your pain, but opioids are not always the best choice. You should first try all the other options your care team gave you. Take this medicine for as short a time (and as few doses) as possible.    Some activities can increase your pain, such as bandage changes or therapy sessions. It may help to take your pain medicine 30 to 60 minutes before these activities. Reduce your stress by getting enough sleep, working on hobbies you enjoy and practicing relaxation or meditation. Talk to your care team about ways to manage your pain beyond prescription opioids.    These medicines have risks:    DO NOT drive when on new or higher doses of pain medicine. These medicines can affect your alertness and reaction times, and you could be arrested for driving under the influence (DUI). If you need to use opioids long-term, talk to your care team about driving.    DO NOT operate heavy machinery    DO NOT do any other dangerous activities while taking these medicines.    DO NOT drink any alcohol while taking these medicines.     If the opioid prescribed includes acetaminophen, DO NOT take with any other medicines that contain acetaminophen. Read all labels carefully. Look for the word  acetaminophen  or  Tylenol.  Ask your pharmacist if you have questions or are unsure.    You can get addicted to pain medicines, especially if you have a history of addiction (chemical, alcohol or substance dependence). Talk to your care team about ways to reduce  this risk.    All opioids tend to cause constipation. Drink plenty of water and eat foods that have a lot of fiber, such as fruits, vegetables, prune juice, apple juice and high-fiber cereal. Take a laxative (Miralax, milk of magnesia, Colace, Senna) if you don t move your bowels at least every other day. Other side effects include upset stomach, sleepiness, dizziness, throwing up, tolerance (needing more of the medicine to have the same effect), physical dependence and slowed breathing.    Store your pills in a secure place, locked if possible. We will not replace any lost or stolen medicine. If you don t finish your medicine, please throw away (dispose) as directed by your pharmacist. The [place] [employer]ution [employer] has more information about safe disposal: https://[email or web address].[facility].mn.us/living-green/managing-unwanted-medications         Primary Care Provider Office Phone # Fax #    Sarah CONOR Hickey PA-C [phone]       [address] [place] [address]        Equal Access to Services     [facility]: Hadii david capone hadasho Soomaali, waaxda luqadaha, qaybta kaalmada veda, elke freeman . So [facility] [phone].    ATENCIÓN: Si habla español, tiene a coyne disposición servicios gratuitos de asistencia lingüística. Stacia al [phone].    We comply with applicable federal civil rights laws and [place] laws. We do not discriminate on the basis of race, color, national origin, age, disability, sex, sexual orientation, or gender identity.            Thank you!     Thank you for choosing [facility]  for your care. Our goal is always to provide you with excellent care. Hearing back from our patients is one way we can continue to improve our services. Please take a few minutes to complete the written survey that you may receive in the mail after your visit with us. Thank you!             Your Updated Medication List - Protect others  around you: Learn how to safely use, store and throw away your medicines at www.disposemymeds.org.          This list is accurate as of 8/24/18 11:27 AM.  Always use your most recent med list.                   Brand Name Dispense Instructions for use Diagnosis    HYDROcodone-acetaminophen 5-325 MG per tablet    NORCO    90 tablet    Take 1-2 tablets by mouth every 8 hours as needed for moderate to severe pain (Max 4 per day)    Chronic bilateral low back pain without sciatica       tamsulosin 0.4 MG capsule    FLOMAX    90 capsule    Take 1 capsule (0.4 mg) by mouth daily    Urinary frequency       traZODone 50 MG tablet    DESYREL    90 tablet    Take 1 tablet (50 mg) by mouth nightly as needed for sleep    Psychophysiological insomnia       triamcinolone 0.1 % cream    KENALOG    80 g    Apply sparingly to affected area three times daily as needed    Skin lesion of right leg

## 2018-08-24 NOTE — LETTER
[facility]    08/24/18    Patient: Luther Kam  YOB: 1935  Medical Record Number: [id number]                                                                  Controlled Substance Agreement  I understand that my care provider has prescribed controlled substances (narcotics, tranquilizers, and/or stimulants) to help manage my condition(s).  I am taking this medicine to help me function or work.  I know that this is strong medicine.  It could have serious side effects and even cause a dependency on the drug.  If I stop these medicines suddenly, I could have severe withdrawal symptoms.    The risks, benefits, and side effects of these medication(s) were explained to me.  I agree that:  1. I will take part in other treatments as advised by my provider.  This may be psychiatry or counseling, physical therapy, behavioral therapy, group treatment, or a referral to a pain clinic.  I will reduce or stop my medicine when my provider tells me to do so.   2. I will take my medicines as prescribed.  I will not change the dose or schedule unless my provider tells me to.  There will be no refills if I  run out early.   I may be contacted at any time without warning and asked to complete a drug test or pill count.   3. I will keep all my appointments at the clinic.  If I miss appointments or fail to follow instructions, my provider may stop my medicine.  4. I will not ask other providers to prescribe controlled substances. And I will not accept controlled substances from other people. If I need another prescribed controlled substance for a new reason, I will notify my provider within one business day.  5. If I enroll in the [place] Medical Marijuana program, I will tell my provider.  I will also sign an agreement to share my medical records with my provider.  6. I will use one pharmacy to fill all of my controlled substance prescriptions.  If my prescription is mailed to my pharmacy, it may take 5 to  7 days for my medicine to be ready.  7. I understand that my provider, clinic care team, and pharmacy can track controlled substance prescriptions from other providers through a central database (prescription monitoring program).  8. I will bring in my list of medications (or my medicine bottles) each time I come to the clinic.  [id number] REV-  07/2018                                                                                                                                   Page 1 of 2      [facility]    08/24/18    Patient: Luther Kam  YOB: 1935  Medical Record Number: [id number]    9. Refills of controlled substances will be made only during office hours.  It is up to me to make sure that I do not run out of my medicines on weekends or holidays.    10. I am responsible for my prescriptions.  If the medicine/prescription is lost or stolen, it will not be replaced.   I also agree not to share these medicines with anyone.  11. I agree to not use ANY illegal or recreational drugs.  This includes marijuana, cocaine, bath salts or other drugs.  I agree not to use alcohol unless my provider says I may.  I agree to give urine samples whenever asked.  If I fail to give a urine sample, the provider may stop my medicine.     12. I will tell my nurse or provider right away if I become pregnant or have a new medical problem treated outside of [facility].  13. I understand that this medicine can affect my thinking and judgment.  It may be unsafe for me to drive, use machinery and do dangerous tasks.  I will not do any of these things until I know how the medicine affects me.  If my dose changes, I will wait to see how it affects me.  I will contact my provider if I have concerns about medicine side effects.  I understand that if I do not follow any of the conditions above, my prescriptions or treatment may be stopped.    I agree that my provider, clinic care team, and pharmacy may work with  any city, state or federal law enforcement agency that investigates the misuse, sale, or other diversion of my controlled medicine. I will allow my provider to discuss my care with or share a copy of this agreement with any other treating provider, pharmacy or emergency room where I receive care.  I agree to give up (waive) any right of privacy or confidentiality with respect to these authorizations.   I have read this agreement and have asked questions about anything I did not understand.   ___________________________________    ___________________________  Patient Signature                                                           Date and Time  ___________________________________     ____________________________  Sarah Hickey PA-C          [id number] REV-  07/2018                                                                                                                                                   Page 2 of 2  Opioid Pain Medicines (also known as Narcotics)  What You Need to Know      What are opioids?   Opioids are pain medicines that must be prescribed by a doctor. Examples are:     morphine (MS Contin, Irena)    oxycodone (Oxycontin)    oxycodone and acetaminophen (Percocet)    hydrocodone and acetaminophen (Vicodin, Norco)     fentanyl patch (Duragesic)     hydromorphone (Dilaudid)     methadone     What do opioids do well?   Opioids are best for short-term pain after a surgery or injury. They also work well for cancer pain. Unlike other pain medicines, they do not cause liver or kidney failure or ulcers. They may help some people with long-lasting (chronic) pain.     What do opioids NOT do well?   Opioids never get rid of pain entirely, and they do not work well for most patients with chronic pain. Opioids do not reduce swelling, one of the causes of pain. They also don t work well for nerve pain.     Side effects  Talk to your doctor before you start or decide to keep taking one of  these medicines. Side effects include:    Lowers your breathing rate enough that it could cause death    Death due to taking more than the prescribed dose    Serious lifelong opioid use      Dependence is not the same as addiction. Addiction is when people keep using a substance that harms their body, their mind or their relations with others. If you have a history of drug or alcohol abuse, taking opioids can cause a relapse.  Over time, opioids don t work as well. Most people will need higher and higher doses. The higher the dose, the more serious the side effects. We don t know the long-term effects of opioids.   People who have used opioids for a long time have a lower quality of life, worse depression, higher levels of pain and more visits to doctors.  Overdose from prescription drugs is the second leading cause of death in the [place] The risk of overdose rises when opioids are taken with other drugs such as:    Medicines used for anxiety and panic attacks (such as lorazepam, alprazolam, clonazepam    Other sedatives    Alcohol    Illegal drugs such as heroin  Never share your opioids with others. Be sure to store opioids in a secure place, locked if possible.Young children can easily swallow them and overdose.     Are there other ways to manage pain?  Ways to help reduce pain:    Exercise every day.    Treat health problems that may be causing pain.    Treat mental health problems like depression and anxiety.     Worse depression symptoms; Less pleasure in things you usually enjoy    Feeling tired or sluggish    Slower thoughts or cloudy thinking    Being more sensitive to pain over time; Pain is harder to control.    Trouble sleeping or restless sleep    Changes in hormone levels (for example, less testosterone).     Changes in sex drive or ability to have sex    Long lasting nausea and constipation    Trouble breathing while asleep; This is worse with lung problems like COPD or sleep apnea.    Unsafe  driving    Getting sick more often    Itching    Feeling dizzy    Dry mouth    Sweating    Trouble emptying the bladder (peeing). This is worse if you have an enlarged prostate or get urinary tract infections (UTIs).    What else should I know about opioids?  When someone takes opioids for too long or too often, they become dependent. This means that if you stop or reduce the medicine too quickly, you will have withdrawal symptoms.          Practice good sleep habits.  Try to go to bed and get up at the same time every day.    Stop smoking.  Tobacco use can make pain worse.    Do things that you enjoy.    Find a way to work through pain without drugs.  Try deep breathing, meditation, visual imagery and aromatherapy.    Ask your doctor to help you create a plan to manage your pain.

## 2018-08-30 ENCOUNTER — OFFICE VISIT (OUTPATIENT)
Dept: FAMILY MEDICINE | Facility: OTHER | Age: 83
End: 2018-08-30
Payer: COMMERCIAL

## 2018-08-30 VITALS
SYSTOLIC BLOOD PRESSURE: 128 MMHG | TEMPERATURE: 98.2 F | RESPIRATION RATE: 16 BRPM | DIASTOLIC BLOOD PRESSURE: 82 MMHG | HEART RATE: 76 BPM

## 2018-08-30 DIAGNOSIS — T16.1XXA FOREIGN BODY OF RIGHT EAR, INITIAL ENCOUNTER: Primary | ICD-10-CM

## 2018-08-30 PROCEDURE — 99213 OFFICE O/P EST LOW 20 MIN: CPT | Performed by: FAMILY MEDICINE

## 2018-08-30 NOTE — MR AVS SNAPSHOT
After Visit Summary   8/30/2018    Luther Kam    MRN: [id number]           Patient Information     Date Of Birth          1935        Visit Information        Provider Department      8/30/2018 1:20 PM Suly Brannon MD [facility]        Today's Diagnoses     Foreign body of right ear, initial encounter    -  1       Follow-ups after your visit        Who to contact     If you have questions or need follow up information about today's clinic visit or your schedule please contact [facility] directly at [phone].  Normal or non-critical lab and imaging results will be communicated to you by MyChart, letter or phone within 4 business days after the clinic has received the results. If you do not hear from us within 7 days, please contact the clinic through MyChart or phone. If you have a critical or abnormal lab result, we will notify you by phone as soon as possible.  Submit refill requests through [employer] or call your pharmacy and they will forward the refill request to us. Please allow 3 business days for your refill to be completed.          Additional Information About Your Visit        Care EveryWhere ID     This is your Care EveryWhere ID. This could be used by other organizations to access your [place] medical records  [id number]        Your Vitals Were     Pulse Temperature Respirations             76 98.2  F (36.8  C) (Temporal) 16          Blood Pressure from Last 3 Encounters:   08/30/18 128/82   08/24/18 124/78   04/03/18 144/86    Weight from Last 3 Encounters:   08/24/18 158 lb 9.6 oz (71.9 kg)   04/03/18 162 lb (73.5 kg)   02/20/18 164 lb (74.4 kg)              We Performed the Following     REMOVE FOREIGN BODY SIMPLE        Primary Care Provider Office Phone # Fax #    Sarah Hickey PA-C [phone]       [address]  [facility] [address]        Equal Access to Services     DANII LEE AH: Michael capone  josesito Velez, wabrandeeda luqadaha, qaybta kajelly mccollum, elke maurilioin hayaajake meadebette guidoviridiana laSophiavalerie frank. So [facility] [phone].    ATENCIÓN: Si habla martin, tiene a coyne disposición servicios gratuitos de asistencia lingüística. Stacia al [phone].    We comply with applicable federal civil rights laws and [place] laws. We do not discriminate on the basis of race, color, national origin, age, disability, sex, sexual orientation, or gender identity.            Thank you!     Thank you for choosing [facility]  for your care. Our goal is always to provide you with excellent care. Hearing back from our patients is one way we can continue to improve our services. Please take a few minutes to complete the written survey that you may receive in the mail after your visit with us. Thank you!             Your Updated Medication List - Protect others around you: Learn how to safely use, store and throw away your medicines at www.disposemymeds.org.          This list is accurate as of 8/30/18  1:50 PM.  Always use your most recent med list.                   Brand Name Dispense Instructions for use Diagnosis    HYDROcodone-acetaminophen 5-325 MG per tablet    NORCO    90 tablet    Take 1-2 tablets by mouth every 8 hours as needed for moderate to severe pain (Max 4 per day)    Chronic bilateral low back pain without sciatica       tamsulosin 0.4 MG capsule    FLOMAX    90 capsule    Take 1 capsule (0.4 mg) by mouth daily    Urinary frequency       traZODone 50 MG tablet    DESYREL    90 tablet    Take 1 tablet (50 mg) by mouth nightly as needed for sleep    Psychophysiological insomnia       triamcinolone 0.1 % cream    KENALOG    80 g    Apply sparingly to affected area three times daily as needed    Skin lesion of right leg

## 2018-08-30 NOTE — PROGRESS NOTES
SUBJECTIVE:   Luther Kam is a 83 year old male who presents to clinic today for the following health issues:      Ear Problem   The current episode started in the past 7 days. The problem occurs intermittently. The problem has been gradually worsening. Associated symptoms comments: Having right ear pain and tenderness. RN looked in the ear and able to see the dome of hearing aid in the ear.   . Nothing aggravates the symptoms. He has tried nothing for the symptoms. The treatment provided no relief.     -------------------------------------  Problem list and histories reviewed & adjusted, as indicated.  Additional history: as documented        Patient Active Problem List   Diagnosis     Irritable bowel syndrome     Rosacea     Alcohol abuse, episodic drinking behavior     Elevated blood pressure reading without diagnosis of hypertension     Low hemoglobin - all his life     Localized edema     Compression fracture of L1 lumbar vertebra, with routine healing, subsequent encounter     Compression fracture of L1 lumbar vertebra with routine healing     Chronic bilateral low back pain without sciatica     Psychophysiological insomnia     Urinary frequency     Past Surgical History:   Procedure Laterality Date     HC HEMORRHOIDECT EXTERNAL, COMPLETE  1962      REPAIR ING HERNIA,5+Y/O,REDUCIBL  2007    right       Social History   Substance Use Topics     Smoking status: Former Smoker     Packs/day: 0.50     Types: Cigarettes     Smokeless tobacco: Never Used      Comment: socially, not daily     Alcohol use Yes      Comment: 3-4 day to rare     Family History   Problem Relation Age of Onset     Hypertension Mother          Current Outpatient Prescriptions   Medication Sig Dispense Refill     HYDROcodone-acetaminophen (NORCO) 5-325 MG per tablet Take 1-2 tablets by mouth every 8 hours as needed for moderate to severe pain (Max 4 per day) 90 tablet 0     tamsulosin (FLOMAX) 0.4 MG capsule Take 1 capsule (0.4 mg) by  mouth daily 90 capsule 3     traZODone (DESYREL) 50 MG tablet Take 1 tablet (50 mg) by mouth nightly as needed for sleep 90 tablet 1     triamcinolone (KENALOG) 0.1 % cream Apply sparingly to affected area three times daily as needed 80 g 0     No Known Allergies  Recent Labs   Lab Test  08/17/17   0757  08/08/17   1414  09/28/15   0856   LDL  62   --   67   HDL  82   --   81   TRIG  47   --   73   ALT   --   25   --    CR  0.82  1.46*   --    GFRESTIMATED  90  46*   --    GFRESTBLACK  >90  56*   --    POTASSIUM  4.4  4.6   --    TSH   --   2.13  2.19      BP Readings from Last 3 Encounters:   08/30/18 128/82   08/24/18 124/78   04/03/18 144/86    Wt Readings from Last 3 Encounters:   08/24/18 158 lb 9.6 oz (71.9 kg)   04/03/18 162 lb (73.5 kg)   02/20/18 164 lb (74.4 kg)                  Labs reviewed in EPIC    ROS:  Constitutional, HEENT, cardiovascular, pulmonary, gi and gu systems are negative, except as otherwise noted.    OBJECTIVE:     /82 (BP Location: Left arm, Patient Position: Chair, Cuff Size: Adult Regular)  Pulse 76  Temp 98.2  F (36.8  C) (Temporal)  Resp 16  There is no height or weight on file to calculate BMI.   Physical Exam   Constitutional: He is oriented to person, place, and time. He appears well-developed and well-nourished.   HENT:   Head: Normocephalic and atraumatic.   Transparent ear muff noted in b/l ears right more embedded deeper into the ear canal   Eyes: EOM are normal.   Neurological: He is alert and oriented to person, place, and time.   Psychiatric: He has a normal mood and affect.         Diagnostic Test Results:  none     ASSESSMENT/PLAN:     Problem List Items Addressed This Visit     None      Visit Diagnoses     Foreign body of right ear, initial encounter    -  Primary         Foreign body removed from b/l ears usinga n aligator forceps. The right side was slightly difficult with a small abrasion in the ear canal leading to a minor bleed which seems to self  limiting. Advised to avoid using the hearing aids until seen by audiology for a replacement    Suly Brannon MD  [facility]

## 2018-11-06 DIAGNOSIS — M54.50 CHRONIC BILATERAL LOW BACK PAIN WITHOUT SCIATICA: ICD-10-CM

## 2018-11-06 DIAGNOSIS — G89.29 CHRONIC BILATERAL LOW BACK PAIN WITHOUT SCIATICA: ICD-10-CM

## 2018-11-06 RX ORDER — HYDROCODONE BITARTRATE AND ACETAMINOPHEN 5; 325 MG/1; MG/1
1-2 TABLET ORAL EVERY 8 HOURS PRN
Qty: 90 TABLET | Refills: 0 | Status: SHIPPED | OUTPATIENT
Start: 2018-11-06 | End: 2019-01-22

## 2018-11-06 NOTE — TELEPHONE ENCOUNTER
Per our agreement ok for refill (gets #90/3 months). Rx signed and placed in MA task.     Ruddy Hickey PA-C  [facility]

## 2018-11-06 NOTE — TELEPHONE ENCOUNTER
Reason for Call:  Medication or medication refill:    Do you use a [facility]?  Name of the pharmacy and phone number for the current request:  Patient will pick this up    Name of the medication requested: Norco    Other request: Patient does not like to get phone calls, he will just stop back in this afternoon to see if this is ready.     Can we leave a detailed message on this number? NO    Phone number patient can be reached at:     Best Time:     Call taken on 11/6/2018 at 9:23 AM by Ally De La Vega

## 2018-11-06 NOTE — TELEPHONE ENCOUNTER
Requested Prescriptions   Pending Prescriptions Disp Refills     HYDROcodone-acetaminophen (NORCO) 5-325 MG per tablet 90 tablet 0     Sig: Take 1-2 tablets by mouth every 8 hours as needed for moderate to severe pain (Max 4 per day)    There is no refill protocol information for this order        HYDROcodone-acetaminophen (NORCO) 5-325 MG per tablet      Last Written Prescription Date:  08/24/2018  Last Fill Quantity: 90,   # refills: 0  Last Office Visit: 08/30/2018  Future Office visit:       Routing refill request to provider for review/approval because:  Drug not on the [facility], P or [facility] refill protocol or controlled substance  Beth Goodson RN, BSN

## 2019-01-22 DIAGNOSIS — M54.50 CHRONIC BILATERAL LOW BACK PAIN WITHOUT SCIATICA: ICD-10-CM

## 2019-01-22 DIAGNOSIS — G89.29 CHRONIC BILATERAL LOW BACK PAIN WITHOUT SCIATICA: ICD-10-CM

## 2019-01-22 RX ORDER — HYDROCODONE BITARTRATE AND ACETAMINOPHEN 5; 325 MG/1; MG/1
1-2 TABLET ORAL EVERY 8 HOURS PRN
Qty: 90 TABLET | Refills: 0 | Status: SHIPPED | OUTPATIENT
Start: 2019-01-22 | End: 2019-04-29

## 2019-01-22 NOTE — TELEPHONE ENCOUNTER
Reason for Call:  Other prescription    Detailed comments: pt stopped by clinic to ask for a refill on his HYDROcodone-acetaminophen (NORCO) 5-325 MG per tablet. He see's CDL and was told by her that when he needs a refill to just let her team know and they would get it for me. He doesn't want a phone call, he just said he would be back around 4:30 today for the script. Please advise.     Phone Number Patient can be reached at: Home number on file [phone] (home) - please don't call, he doesn't like getting phone calls     Best Time: n/a     Can we leave a detailed message on this number? Not Applicable    Call taken on 1/22/2019 at 11:41 AM by Ashley Salas

## 2019-04-29 DIAGNOSIS — M54.50 CHRONIC BILATERAL LOW BACK PAIN WITHOUT SCIATICA: ICD-10-CM

## 2019-04-29 DIAGNOSIS — G89.29 CHRONIC BILATERAL LOW BACK PAIN WITHOUT SCIATICA: ICD-10-CM

## 2019-04-29 RX ORDER — HYDROCODONE BITARTRATE AND ACETAMINOPHEN 5; 325 MG/1; MG/1
1-2 TABLET ORAL EVERY 8 HOURS PRN
Qty: 90 TABLET | Refills: 0 | Status: SHIPPED | OUTPATIENT
Start: 2019-04-29 | End: 2019-08-05

## 2019-06-17 PROBLEM — S32.010D COMPRESSION FRACTURE OF L1 VERTEBRA WITH ROUTINE HEALING: Status: ACTIVE | Noted: 2017-08-18

## 2019-07-31 NOTE — PROGRESS NOTES
Subjective     Luther Kam is a 84 year old male who presents to clinic today for the following health issues:    HPI     Chronic Pain Follow-Up       Type / Location of Pain: Back   Analgesia/pain control:       Recent changes:  same      Overall control: Tolerable with discomfort  Activity level/function:      Daily activities:  Can do most things most days, with some rest    Work:  not applicable  Adverse effects:  No  Adherance    Taking medication as directed?  Yes    Participating in other treatments: no   Risk Factors:    Sleep:  Good    Mood/anxiety:  controlled    Recent family or social stressors:  Depression more in evenings     Other aggravating factors: none  PHQ-9 SCORE 4/3/2018   PHQ-9 Total Score 6     ABHIJEET-7 SCORE 4/3/2018   Total Score 7     Encounter-Level CSA - 08/24/2018:    Controlled Substance Agreement - Scan on 8/30/2018  2:48 PM: CONTROLLED SUBSTANCE AGREEMENT (below)       Patient-Level CSA:    There are no patient-level csa.         BP Readings from Last 3 Encounters:   08/05/19 126/80   08/30/18 128/82   08/24/18 124/78    Wt Readings from Last 3 Encounters:   08/05/19 73.9 kg (163 lb)   08/24/18 71.9 kg (158 lb 9.6 oz)   04/03/18 73.5 kg (162 lb)           Reviewed and updated as needed this visit by Provider  Tobacco  Allergies  Meds  Problems  Med Hx  Surg Hx  Fam Hx         Review of Systems   ROS COMP: Constitutional, HEENT, cardiovascular, pulmonary, gi and gu systems are negative, except as otherwise noted.      Objective    /80   Pulse 78   Temp 97.9  F (36.6  C) (Temporal)   Resp 16   Wt 73.9 kg (163 lb)   SpO2 97%   BMI 25.11 kg/m    Body mass index is 25.11 kg/m .  Physical Exam   GENERAL APPEARANCE: healthy, alert and no distress  EYES: Eyes grossly normal to inspection, PERRLA, conjunctivae and sclerae without injection or discharge, EOM intact   RESP: Lungs clear to auscultation - no rales, rhonchi or wheezes    CV: Regular rates and rhythm, normal S1 S2,  no S3 or S4, no murmur, click or rub, no peripheral edema and peripheral pulses strong and symmetric bilaterally   MS: No musculoskeletal defects are noted and gait is age appropriate without ataxia   SKIN: No suspicious lesions or rashes, hydration status appears adeuqate with normal skin turgor   PSYCH: Alert and oriented x3; speech- coherent , normal rate and volume; able to articulate logical thoughts, able to abstract reason, no tangential thoughts, no hallucinations or delusions, mentation appears normal, Mood is euthymic. Affect is appropriate for this mood state and bright. Thought content is free of suicidal ideation, hallucinations, and delusions. Dress is adequate and upkept. Eye contact is good during conversation.       Diagnostic Test Results:  Labs reviewed in Epic  none         Assessment & Plan       ICD-10-CM    1. Chronic bilateral low back pain without sciatica M54.5 HYDROcodone-acetaminophen (NORCO) 5-325 MG tablet    G89.29      - Chronic for patient, recovering from compression fracture of L1   - Stable on occasional Norco use (<1 per day), also instructed on Ibuprofen use  - Reviewed both use and side effects along with top dose, patient will continue to use sparingly, may cause sedation, addiction, and constipation, no driving on this medication   - Recheck yearly (due to cost for him)  - Fall precautions discussed, patient aware, states they don't make him tired (does live alone)   -  reviewed, no concerns  - CSA - 8/24/18 - reviewed and updated today 8/5/19      Hydrocodone-APAP 5-325 mg, #90/3 months, <1 per day     The patient indicates understanding of these issues and agrees with the plan.    Return in about 1 year (around 8/5/2020).    Sarah Hickey PA-C  [facility]

## 2019-08-05 ENCOUNTER — OFFICE VISIT (OUTPATIENT)
Dept: FAMILY MEDICINE | Facility: OTHER | Age: 84
End: 2019-08-05
Payer: MEDICARE

## 2019-08-05 VITALS
TEMPERATURE: 97.9 F | OXYGEN SATURATION: 97 % | DIASTOLIC BLOOD PRESSURE: 80 MMHG | BODY MASS INDEX: 25.11 KG/M2 | RESPIRATION RATE: 16 BRPM | HEART RATE: 78 BPM | SYSTOLIC BLOOD PRESSURE: 126 MMHG | WEIGHT: 163 LBS

## 2019-08-05 DIAGNOSIS — M54.50 CHRONIC BILATERAL LOW BACK PAIN WITHOUT SCIATICA: ICD-10-CM

## 2019-08-05 DIAGNOSIS — G89.29 CHRONIC BILATERAL LOW BACK PAIN WITHOUT SCIATICA: ICD-10-CM

## 2019-08-05 PROCEDURE — 99214 OFFICE O/P EST MOD 30 MIN: CPT | Performed by: PHYSICIAN ASSISTANT

## 2019-08-05 RX ORDER — HYDROCODONE BITARTRATE AND ACETAMINOPHEN 5; 325 MG/1; MG/1
1-2 TABLET ORAL EVERY 8 HOURS PRN
Qty: 90 TABLET | Refills: 0 | Status: SHIPPED | OUTPATIENT
Start: 2019-08-05 | End: 2019-10-22

## 2019-08-05 ASSESSMENT — PAIN SCALES - GENERAL: PAINLEVEL: MILD PAIN (2)

## 2019-08-05 NOTE — LETTER
[facility]  08/05/19    Patient: Luther Kam  YOB: 1935  Medical Record Number: [id number]                                                                  Opioid / Opioid Plus Controlled Substance Agreement    I understand that my care provider has prescribed an opioid (narcotic) controlled substance to help manage my condition(s). I am taking this medicine to help me function or work. I know this is strong medicine, and that it can cause serious side effects. Opioid medicine can be sedating, addicting and may cause a dependency on the drug. They can affect my ability to drive or think, and cause depression. They need to be taken exactly as prescribed. Combining opioids with certain medicines or chemicals (such as cocaine, sedatives and tranquilizers, sleeping pills, meth) can be dangerous or even fatal. Also, if I stop opioids suddenly, I may have severe withdrawal symptoms. Last, I understand that opioids do not work for all types of pain nor for all patients. If not helpful, I may be asked to stop them.        The risks, benefits, and side effects of these medicine(s) were explained to me. I agree that:    1. I will take part in other treatments as advised by my care team. This may be psychiatry or counseling, physical therapy, behavioral therapy, group treatment or a referral to a pain clinic. I will reduce or stop my medicine when my care team tells me to do so.  2. I will take my medicines as prescribed. I will not change the dose or schedule unless my care team tells me to. There will be no refills if I  run out early.   I may be contactedwithout warning and asked to complete a urine drug test or pill count at any time.   3. I will keep all my appointments, and understand this is part of the monitoring of opioids. My care team may require an office visit for EVERY opioid/controlled substance refill. If I miss appointments or don t follow instructions, my care team may stop my  medicine.  4. I will not ask other providers to prescribe controlled substances, and I will not accept controlled substances from other people. If I need another prescribed controlled substance for a new reason, I will tell my care team within 1 business day.  5. I will use one pharmacy to fill all of my controlled substance prescriptions, and it is up to me to make sure that I do not run out of my medicines on weekends or holidays. If my care team is willing to refill my opioid prescription without a visit, I must request refills only during office hours, refills may take up to 3 days to process, and it may take up to 5 to 7 days for my medicine to be mailed and ready at my pharmacy. Prescriptions will not be mailed anywhere except my pharmacy.        [id number]  Rev 12/18         Registration to scan to EHR                             Page 1 of 2               Controlled Substance Agreement Opioid        [facility]  08/05/19  Patient: Luther Kam  YOB: 1935  Medical Record Number: [id number]                                                                  6. I am responsible for my prescriptions. If the medicine/prescription is lost or stolen, it will not be replaced. I also agree not to share controlled substance medicines with anyone.  7. I agree to not use ANY illegal or recreational drugs. This includes marijuana, cocaine, bath salts or other drugs. I agree not to use alcohol unless my care team says I may.          I agree to give urine samples whenever asked. If I don t give a urine sample, the care team may stop my medicine.    8. If I enroll in the [place] Medical Marijuana program, I will tell my care team. I will also sign an agreement to share my medical records with my care team.   9. I will bring in my list of medicines (or my medicine bottles) each time I come to the clinic.   10. I will tell my care team right away if I become pregnant or have a new medical problem treated  outside of my regular clinic.  11. I understand that this medicine can affect my thinking and judgment. It may be unsafe for me to drive, use machinery and do dangerous tasks. I will not do any of these things until I know how the medicine affects me. If my dose changes, I will wait to see how it affects me. I will contact my care team if I have concerns about medicine side effects.    I understand that if I do not follow any of the conditions above, my prescriptions or treatment may be stopped.      I agree that my provider, clinic care team, and pharmacy may work with any city, state or federal law enforcement agency that investigates the misuse, sale, or other diversion of my controlled medicine. I will allow my provider to discuss my care with or share a copy of this agreement with any other treating provider, pharmacy or emergency room where I receive care. I agree to give up (waive) any right of privacy or confidentiality with respect to these consents.     I have read this agreement and have asked questions about anything I did not understand.      ________________________________________________________________________  Patient signature - Date/Time -  Luther Kam                                      ________________________________________________________________________  Witness signature                                                            ________________________________________________________________________  Provider signature - Sarah Hickey PA-C      [id number]  Rev 12/18         Registration to scan to EHR                         Page 2 of 2                   Controlled Substance Agreement Opioid           Page 1 of 2  Opioid Pain Medicines (also known as Narcotics)  What You Need to Know    What are opioids?   Opioids are pain medicines that must be prescribed by a doctor.  They are also known as narcotics.    Examples are:     morphine (MS Contin, Irena)    oxycodone  (Oxycontin)    oxycodone and acetaminophen (Percocet)    hydrocodone and acetaminophen (Vicodin, Norco)     fentanyl patch (Duragesic)     hydromorphone (Dilaudid)     methadone     What do opioids do well?   Opioids are best for short-term pain after a surgery or injury. They also work well for cancer pain. Unlike other pain medicines, they do not cause liver or kidney failure or ulcers. They may help some people with long-lasting (chronic) pain.     What do opioids NOT do well?   Opioids never get rid of pain entirely, and they do not work well for most patients with chronic pain. Opioids do not reduce swelling, one of the causes of pain. They also don t work well for nerve pain.                           For informational purposes only.  Not to replace the advice of your care provider.  Copyright [facility]. All right reserved. [employer] 661736-Fsd 02/18.      Page 2 of 2    Risks and side effects   Talk to your doctor before you start or decide to keep taking one of these medicines. Side effects include:    Lowering your breathing rate enough to cause death    Overdose, including death, especially if taking higher than prescribed doses    Long-term opioid use    Worse depression symptoms; less pleasure in things you usually enjoy    Feeling tired or sluggish    Slower thoughts or cloudy thinking    Being more sensitive to pain over time; pain is harder to control    Trouble sleeping or restless sleep    Changes in hormone levels (for example, less testosterone)    Changes in sex drive or ability to have sex    Constipation    Unsafe driving    Itching and sweating    Feeling dizzy    Nausea, vomiting and dry mouth    What else should I know about opioids?  When someone takes opioids for too long or too often, they become dependent. This means that if you stop or reduce the medicine too quickly, you will have withdrawal symptoms.    Dependence is not the same as addiction. Addiction is when  people keep using a substance that harms their body, their mind or their relations with others. If you have a history of drug or alcohol abuse, taking opioids can cause a relapse.    Over time, opioids don t work as well. Most people will need higher and higher doses. The higher the dose, the more serious the side effects. We don t know the long-term effects of opioids.      Prescribed opioids aren't the best way to manage chronic pain    Other ways to manage pain include:      Ibuprofen or acetaminophen.  You should always try this first.      Treat health problems that may be causing pain.      acupuncture or massage, deep breathing, meditation, visual imagery, aromatherapy.      Use heat or ice at the pain site      Physical therapy and exercise      Stop smoking      See a counselor or therapist                                                  People who have used opioids for a long time may have a lower quality of life, worse depression, higher levels of pain and more visits to doctors.    Never share your opioids with others. Be sure to store opioids in a secure place, locked if possible.Young children can easily swallow them and overdose.     You can overdose on opioids.  Signs of overdose include decrease or loss of consciousness, slowed breathing, trouble waking and blue lips.  If someone is worried about overdose, they should call 911.    If you are at risk for overdose, you may get naloxone (Narcan, a medicine that reverses the effects of opioids.  If you overdose, a friend or family member can give you Narcan while waiting for the ambulance.  They need to know the signs of overdose and how to give Narcan.    While you're taking opioids:    Don't use alcohol or street drugs. Taking them together can cause death.    Don't take any of these medicines unless your doctor says its okay.  Taking these with opioids can cause death.    Benzodiazepines (such as lorazepam         or diazepam)    Muscle relaxers  (such as cyclobenzaprine)    sleeping pills    other opioids    Safe disposal of opioids  Find your area drug take-back program, your pharmacy mail-back program, buy a special disposal bag (such as Deterra) from your pharmacy or flush them down the toilet.  Use the guidelines at:  www.fda.gov/drugs/resourcesforyou

## 2019-10-21 DIAGNOSIS — M54.50 CHRONIC BILATERAL LOW BACK PAIN WITHOUT SCIATICA: ICD-10-CM

## 2019-10-21 DIAGNOSIS — G89.29 CHRONIC BILATERAL LOW BACK PAIN WITHOUT SCIATICA: ICD-10-CM

## 2019-10-21 NOTE — TELEPHONE ENCOUNTER
Reason for call:  Medication  Reason for Call:  Medication or medication refill:    Do you use a [facility]?  Name of the pharmacy and phone number for the current request:  [facility] - [phone]    Name of the medication requested: HYDROcodone-acetaminophen (NORCO) 5-325 MG tablet    Other request: pt would like this medication to be sent today, he states not to call him, he doesn't like phone calls so he is expecting to pick this up later today. Please advise.     Can we leave a detailed message on this number? YES    Phone number patient can be reached at: Home number on file [phone] (home)    Best Time: anytime    Call taken on 10/21/2019 at 10:30 AM by Montserrat Fall

## 2019-10-21 NOTE — TELEPHONE ENCOUNTER
Patient presents to clinic  checking on status of refill.  He said he will stop by the clinic tomorrow morning around 9:30 to check on it again.  Thank you.

## 2019-10-21 NOTE — TELEPHONE ENCOUNTER
Patient presented to clinic at 6:30 PM looking for this prescription. He wanted this prescription today. Patient informed CDL is not in clinic right now. This originally was sent to the refill pool and not CD[facility].     Patient said he will be in tomorrow morning, as he will be visiting his wife tomorrow at [employer]      Last Written Prescription Date:  08/05/2019  Last Fill Quantity: 90,   # refills: 0  Last Office Visit: 08/05/2019  Future Office visit:       Routing refill request to provider for review/approval because:  Drug not on the [facility], P or [facility] refill protocol or controlled substance

## 2019-10-22 RX ORDER — HYDROCODONE BITARTRATE AND ACETAMINOPHEN 5; 325 MG/1; MG/1
1-2 TABLET ORAL EVERY 8 HOURS PRN
Qty: 90 TABLET | Refills: 0 | Status: SHIPPED | OUTPATIENT
Start: 2019-10-22 | End: 2020-01-06

## 2020-01-03 DIAGNOSIS — M54.50 CHRONIC BILATERAL LOW BACK PAIN WITHOUT SCIATICA: ICD-10-CM

## 2020-01-03 DIAGNOSIS — G89.29 CHRONIC BILATERAL LOW BACK PAIN WITHOUT SCIATICA: ICD-10-CM

## 2020-01-03 RX ORDER — HYDROCODONE BITARTRATE AND ACETAMINOPHEN 5; 325 MG/1; MG/1
1-2 TABLET ORAL EVERY 8 HOURS PRN
Qty: 90 TABLET | Refills: 0 | OUTPATIENT
Start: 2020-01-03

## 2020-01-03 NOTE — TELEPHONE ENCOUNTER
Reason for Call:  Medication or medication refill:    Do you use a [facility]?  Name of the pharmacy and phone number for the current request: [facility] [place] [facility] [address]    Name of the medication requested: HYDROcodone-acetaminophen (NORCO) 5-325 MG tablet    Other request: Pt came in and is requesting a refill on this medication. He will come pick it up. Please Advise thank you    Can we leave a detailed message on this number? YES    Phone number patient can be reached at: Home number on file [phone] (home)    Best Time: anytime    Call taken on 1/3/2020 at 9:34 AM by Fidelina Wolf

## 2020-01-03 NOTE — TELEPHONE ENCOUNTER
Routing refill request to provider for review/approval because:  Drug not on the [facility] refill protocol     Last Written Prescription Date:  10/22/19  Last Fill Quantity: 90,  # refills: 0   Last office visit: 8/5/2019 with prescribing provider:     Future Office Visit:      Juanita Brown, RN, BSN

## 2020-01-03 NOTE — TELEPHONE ENCOUNTER
oxycodone      Last Written Prescription Date:  10/22/2019  Last Fill Quantity: 90,   # refills: 0  Last Office Visit: 08/05/2019  Future Office visit:       Routing refill request to provider for review/approval because:  Drug not on the [facility], [facility] or  Health refill protocol or controlled substance  Per last OV, told to return in about 1 year (around 08/05/2020)

## 2020-01-03 NOTE — TELEPHONE ENCOUNTER
Based on OV notes (see below) 90 tablets every 3 months. Last fill was 10/22/19 he would not be due until 01/22/20.

## 2020-01-06 RX ORDER — HYDROCODONE BITARTRATE AND ACETAMINOPHEN 5; 325 MG/1; MG/1
1-2 TABLET ORAL EVERY 8 HOURS PRN
Qty: 90 TABLET | Refills: 0 | Status: SHIPPED | OUTPATIENT
Start: 2020-01-06 | End: 2020-03-19

## 2020-01-06 NOTE — TELEPHONE ENCOUNTER
Patient is here because he is out of medication. Patient is averaging about 1-2 a day, it varies. Patient has about 2 or 3 left. Is wanting a refill of medication.   Jennifer Borjas MA

## 2020-03-16 NOTE — NURSING NOTE
"Chief Complaint   Patient presents with     Mass     Panel Management       Initial /80 (BP Location: Left arm, Patient Position: Chair, Cuff Size: Adult Regular)  Pulse 88  Temp 98.1  F (36.7  C) (Oral)  Resp 16  Wt 170 lb (77.1 kg)  SpO2 99%  BMI 26.68 kg/m2 Estimated body mass index is 26.68 kg/(m^2) as calculated from the following:    Height as of 9/7/17: 5' 6.93\" (1.7 m).    Weight as of this encounter: 170 lb (77.1 kg).  Medication Reconciliation: complete  " Complex Repair And Z Plasty Text: The defect edges were debeveled with a #15 scalpel blade.  The primary defect was closed partially with a complex linear closure.  Given the location of the remaining defect, shape of the defect and the proximity to free margins a Z plasty was deemed most appropriate for complete closure of the defect.  Using a sterile surgical marker, an appropriate advancement flap was drawn incorporating the defect and placing the expected incisions within the relaxed skin tension lines where possible.    The area thus outlined was incised deep to adipose tissue with a #15 scalpel blade.  The skin margins were undermined to an appropriate distance in all directions utilizing iris scissors.

## 2020-03-19 ENCOUNTER — TELEPHONE (OUTPATIENT)
Dept: FAMILY MEDICINE | Facility: OTHER | Age: 85
End: 2020-03-19

## 2020-03-19 DIAGNOSIS — M54.50 CHRONIC BILATERAL LOW BACK PAIN WITHOUT SCIATICA: ICD-10-CM

## 2020-03-19 DIAGNOSIS — G89.29 CHRONIC BILATERAL LOW BACK PAIN WITHOUT SCIATICA: ICD-10-CM

## 2020-03-19 RX ORDER — HYDROCODONE BITARTRATE AND ACETAMINOPHEN 5; 325 MG/1; MG/1
1-2 TABLET ORAL EVERY 8 HOURS PRN
Qty: 90 TABLET | Refills: 0 | Status: SHIPPED | OUTPATIENT
Start: 2020-03-19 | End: 2020-06-01

## 2020-03-19 NOTE — TELEPHONE ENCOUNTER
Reason for Call:  Medication or medication refill:    Do you use a [facility]?  Name of the pharmacy and phone number for the current request:  [facility] - [phone]    Name of the medication requested: HYDROcodone-acetaminophen (NORCO)    Other request: Patient states he is aware that [facility] is not in, but is hoping another provider can okay the medication request. Patient states he will be stopping by the clinic around noon today to check back on the request to see if its been approved. Please advise.    Can we leave a detailed message on this number? NO    Phone number patient can be reached at: Cell number on file:    [phone]    Best Time: Any    Call taken on 3/19/2020 at 9:45 AM by Marya Sparrow

## 2020-03-19 NOTE — TELEPHONE ENCOUNTER
Due for routine fill. I have e-prescribed, so he doesn't need to come into clinic.     Ruddy Hickey PA-C  [facility]

## 2020-05-23 NOTE — TELEPHONE ENCOUNTER
Due for routine fill. Please let Luther know he is due for an office visit for pain recheck, I need this every 6 months. Our last visit was August.     Rx signed and placed in MA task.      Ruddy Hickey PA-C  [facility]     
Last fill 1/22/19 qty 90   
Notified patient of completed Rx and need for follow up appointment, hard copy was placed at the  for patient to .  
Reason for Call:  Medication or medication refill:    Do you use a [facility]?  Name of the pharmacy and phone number for the current request:  [facility] - [phone]    Name of the medication requested:  hydrocodone    Other request:  Patient states he just lets Sarah Hickey know when he needs a refill.  He states he will be back to clinic around 4 today to see if its ready.  He does not like phone calls.  Thank you    Can we leave a detailed message on this number? Not Applicable, pt does not like to receive phone calls    Phone number patient can be reached at: Home number on file [phone] (home)    Best Time: n/a    Call taken on 4/29/2019 at 9:36 AM by Jeimy Rucker      
Male

## 2020-06-01 ENCOUNTER — TELEPHONE (OUTPATIENT)
Dept: FAMILY MEDICINE | Facility: OTHER | Age: 85
End: 2020-06-01

## 2020-06-01 DIAGNOSIS — G89.29 CHRONIC BILATERAL LOW BACK PAIN WITHOUT SCIATICA: ICD-10-CM

## 2020-06-01 DIAGNOSIS — M54.50 CHRONIC BILATERAL LOW BACK PAIN WITHOUT SCIATICA: ICD-10-CM

## 2020-06-01 RX ORDER — HYDROCODONE BITARTRATE AND ACETAMINOPHEN 5; 325 MG/1; MG/1
1-2 TABLET ORAL EVERY 8 HOURS PRN
Qty: 90 TABLET | Refills: 0 | Status: SHIPPED | OUTPATIENT
Start: 2020-06-01 | End: 2020-08-13

## 2020-06-02 NOTE — TELEPHONE ENCOUNTER
Attempted patient. Unable to LM.     Juanita Brown, RN, BSN    
Attempted to contact patient, no answer and voicemail is full  Daniela Estes, LUZ    
Last Written Prescription Date:  3/19/20  Last Fill Quantity: 90,  # refills: 0   Last office visit: 8/5/2019 with prescribing provider:     Future Office Visit:      Juanita Brown, RN, BSN    
Medication(s) reviewed and approved. Rx. was faxed to the designated pharmacy.      Please notify that this has been done.      Please close the encounter when finished with it.     Sarah Hickey PA-C      
Reason for Call:  Medication or medication refill:    Do you use a [facility]?  Name of the pharmacy and phone number for the current request:  [facility] - [phone]    Name of the medication requested: HYDROcodone-acetaminophen      Other request:     Can we leave a detailed message on this number? YES    Phone number patient can be reached at: Home number on file [phone] (home)    Best Time: any    Call taken on 6/1/2020 at 1:30 PM by Manuel Byrd      
Breath sounds clear and equal bilaterally.

## 2020-08-10 NOTE — PROGRESS NOTES
"Subjective     Luther Kam is a 85 year old male who presents to clinic today for the following health issues:    HPI       Chronic Pain Follow-Up    Where in your body do you have pain? Back and knees.   How has your pain affected your ability to work? Retired.   How well are you sleeping? Good  How has your mood been since your last visit? Stable.   Have you had a significant life event? Patient goes to the nursing home 3 times per day to feed his wife.   Other aggravating factors: Prolonged Standing.   Taking medication as directed? Yes.     - Just feels so up tight, tense       3x/day spoon feeds wife - still still can go but very stressful, all gowned up   - BP at home going up since all the stress      This AM - 158/90        Sometimes a little lower but always around there   - Sleeping really good since quit drinking 2 years ago         PHQ-9 SCORE 4/3/2018   PHQ-9 Total Score 6     ABHIJEET-7 SCORE 4/3/2018   Total Score 7     No flowsheet data found.  Encounter-Level CSA - 08/24/2018:    Controlled Substance Agreement - Scan on 8/30/2018  2:48 PM: CONTROLLED SUBSTANCE AGREEMENT     Patient-Level CSA:    Controlled Substance Agreement - Opioid - Scan on 8/5/2019  3:04 PM: Opiod/Opiod Plus Controlled Substance Agreement 08/05/19           Reviewed and updated as needed this visit by Provider  Allergies  Meds  Problems  Med Hx  Surg Hx         Review of Systems   Constitutional, HEENT, cardiovascular, pulmonary, gi and gu systems are negative, except as otherwise noted.      Objective    BP (!) 148/90   Pulse 86   Temp 98.5  F (36.9  C) (Oral)   Resp 14   Ht 1.676 m (5' 6\")   Wt 77.9 kg (171 lb 12.8 oz)   SpO2 98%   BMI 27.73 kg/m    Body mass index is 27.73 kg/m .  Physical Exam   GENERAL APPEARANCE: healthy, alert and no distress  EYES: Eyes grossly normal to inspection, PERRLA, conjunctivae and sclerae without injection or discharge, EOM intact   RESP: Lungs clear to auscultation - no rales, rhonchi " "or wheezes    CV: Regular rates and rhythm, normal S1 S2, no S3 or S4, no murmur, click or rub, no peripheral edema and peripheral pulses strong and symmetric bilaterally   MS: No musculoskeletal defects are noted and gait is age appropriate without ataxia   SKIN: No suspicious lesions or rashes, hydration status appears adeuqate with normal skin turgor   PSYCH: Alert and oriented x3; speech- coherent , normal rate and volume; able to articulate logical thoughts, able to abstract reason, no tangential thoughts, no hallucinations or delusions, mentation appears normal, Mood is euthymic. Affect is anxious. Thought content is free of suicidal ideation, hallucinations, and delusions. Dress is adequate and upkept. Eye contact is good during conversation.       Diagnostic Test Results:  Labs reviewed in Epic  none         Assessment & Plan       ICD-10-CM    1. Chronic bilateral low back pain without sciatica  M54.5 HYDROcodone-acetaminophen (NORCO) 5-325 MG tablet    G89.29    2. ABHIJEET (generalized anxiety disorder)  F41.1 sertraline (ZOLOFT) 25 MG tablet   3. Elevated BP without diagnosis of hypertension  R03.0      1. Back pain   -  Chronic for patient, recovering from compression fracture of L1   - Stable on occasional Norco use (<1 per day), also instructed on Ibuprofen use  - Reviewed both use and side effects along with top dose, patient will continue to use sparingly, may cause sedation, addiction, and constipation, no driving on this medication   - Recheck yearly (due to cost for him)  - Fall precautions discussed, patient aware, states they don't make him tired (does live alone)   -  reviewed, no concerns  - CSA - 8/24/18 - reviewed and updated today 8/5/19      Hydrocodone-APAP 5-325 mg, #90/3 months, <1 per day     2 & 3. Anxiety  - Reports at home BP's in the 150's/90's, feels very stressed out and just \"tight\" a lot of time   - Discussed this could cause elevation in BP, could also be other things like age, " kidneys, etc.  - I recommended labs to check for reversible etiology but patient declined due to time   - Discussed I would like to avoid BP medications due to his age and risk for falls   - Recommend something for anxiety and BP check at home     Discussed how to check BP and if continues to be high to call clinic     Discussed I would recommend labs before starting a medication (would start with Lisinopril)   - For anxiety, discussed SSRI therapy, patient is in agreement for this after reviewing use and side effects   - Will start Sertraline 25 mg once a day  - Recheck 1 month         Plan   - Goal BP <150/90     Keep log of blood pressures, bring to clinic at appointment      Call in 1-2 weeks if blood pressure still high   - Start Sertraline (Zoloft) 25 mg once a day in the morning   - Recheck 1 month     The patient indicates understanding of these issues and agrees with the plan.    Return in about 1 month (around 9/13/2020).    Sarah Hickey PA-C  [facility]

## 2020-08-13 ENCOUNTER — OFFICE VISIT (OUTPATIENT)
Dept: FAMILY MEDICINE | Facility: OTHER | Age: 85
End: 2020-08-13
Payer: MEDICARE

## 2020-08-13 VITALS
HEIGHT: 66 IN | WEIGHT: 171.8 LBS | BODY MASS INDEX: 27.61 KG/M2 | OXYGEN SATURATION: 98 % | HEART RATE: 86 BPM | RESPIRATION RATE: 14 BRPM | SYSTOLIC BLOOD PRESSURE: 148 MMHG | DIASTOLIC BLOOD PRESSURE: 90 MMHG | TEMPERATURE: 98.5 F

## 2020-08-13 DIAGNOSIS — F41.1 GAD (GENERALIZED ANXIETY DISORDER): ICD-10-CM

## 2020-08-13 DIAGNOSIS — R03.0 ELEVATED BP WITHOUT DIAGNOSIS OF HYPERTENSION: ICD-10-CM

## 2020-08-13 DIAGNOSIS — M54.50 CHRONIC BILATERAL LOW BACK PAIN WITHOUT SCIATICA: Primary | ICD-10-CM

## 2020-08-13 DIAGNOSIS — G89.29 CHRONIC BILATERAL LOW BACK PAIN WITHOUT SCIATICA: Primary | ICD-10-CM

## 2020-08-13 PROCEDURE — 99214 OFFICE O/P EST MOD 30 MIN: CPT | Performed by: PHYSICIAN ASSISTANT

## 2020-08-13 RX ORDER — SERTRALINE HYDROCHLORIDE 25 MG/1
25 TABLET, FILM COATED ORAL EVERY MORNING
Qty: 30 TABLET | Refills: 1 | Status: SHIPPED | OUTPATIENT
Start: 2020-08-13 | End: 2020-09-09

## 2020-08-13 RX ORDER — HYDROCODONE BITARTRATE AND ACETAMINOPHEN 5; 325 MG/1; MG/1
1-2 TABLET ORAL EVERY 8 HOURS PRN
Qty: 90 TABLET | Refills: 0 | Status: SHIPPED | OUTPATIENT
Start: 2020-08-13 | End: 2020-10-07

## 2020-08-13 ASSESSMENT — MIFFLIN-ST. JEOR: SCORE: 1412.03

## 2020-08-13 NOTE — PATIENT INSTRUCTIONS
- Goal BP <150/90     Keep log of blood pressures, bring to clinic at appointment      Call in 1-2 weeks if blood pressure still high     - Start Sertraline (Zoloft) 25 mg once a day in the morning     - Recheck 1 month

## 2020-08-28 NOTE — TELEPHONE ENCOUNTER
Hydrocodone       Last Written Prescription Date:  04/03/18  Last Fill Quantity: 90,   # refills: 0  Last Office Visit: 04/03/18  Future Office visit:   NA    Routing refill request to provider for review/approval because:  Drug not on the [facility], P or [facility] refill protocol or controlled substance     LOV: 01/06/20 RTC 3-4 months  LR: 07/27/20    Due for an appointment  Left message to call back  Pended 30 days supply

## 2020-09-03 NOTE — PROGRESS NOTES
"Subjective     Luther Kam is a 85 year old male who presents to clinic today for the following health issues:    HPI     Anxiety Follow-Up    How are you doing with your anxiety since your last visit? No change - not sure if the medication is working or not.     Are you having other symptoms that might be associated with anxiety? Yes:  high blood pressure    Have you had a significant life event? OTHER: trying to see his wife in the nursing home and she has tested postive for COVID back in august     Are you feeling depressed? Yes:  at times he does    Do you have any concerns with your use of alcohol or other drugs? No - hasn't had anything to drink since about January     Social History     Tobacco Use     Smoking status: Former Smoker     Packs/day: 0.50     Types: Cigarettes     Smokeless tobacco: Never Used     Tobacco comment: socially, not daily   Substance Use Topics     Alcohol use: Not Currently     Comment: none      Drug use: No     ABHIJEET-7 SCORE 4/3/2018   Total Score 7     PHQ 4/3/2018   PHQ-9 Total Score 6   Q9: Thoughts of better off dead/self-harm past 2 weeks Not at all         Review of Systems   Constitutional, HEENT, cardiovascular, pulmonary, gi and gu systems are negative, except as otherwise noted.      Objective    BP (!) 140/90   Pulse 96   Temp 98.2  F (36.8  C) (Temporal)   Resp 22   Ht 1.68 m (5' 6.14\")   Wt 77.1 kg (170 lb)   SpO2 99%   BMI 27.32 kg/m    Body mass index is 27.32 kg/m .  Physical Exam   GENERAL APPEARANCE: healthy, alert and no distress  EYES: Eyes grossly normal to inspection, PERRLA, conjunctivae and sclerae without injection or discharge, EOM intact   MS: No musculoskeletal defects are noted  SKIN: No suspicious lesions or rashes, hydration status appears adeuqate with normal skin turgor   PSYCH: Alert and oriented x3; speech- coherent , normal rate and volume; able to articulate logical thoughts, able to abstract reason, no tangential thoughts, no " hallucinations or delusions, mentation appears normal, Mood is euthymic. Affect is appropriate for this mood state and bright. Thought content is free of suicidal ideation, hallucinations, and delusions. Dress is adequate and upkept. Eye contact is good during conversation.       Diagnostics  None     Assessment & Plan       ICD-10-CM    1. ABHIJEET (generalized anxiety disorder)  F41.1 sertraline (ZOLOFT) 50 MG tablet     hydrOXYzine (ATARAX) 25 MG tablet   2. Elevated BP without diagnosis of hypertension  R03.0      - Patient reports hasn't really felt any change with starting Sertraline 25 mg once a day   - Still having some panic feelings, wondering if there is anything quick acting to try      Discussed with his pain medication for his back, benzos are not indicated, also not indicated due to his age      Discussed trial of Hydroxyzine, reviewed use and side effects   - Will also increase Sertraline to 50 mg, discussed use and side effects     Discussed will take time for this to really work in him   - Will continue to recheck monthly   - Patient reports passing SI but no plan, verbally consents to safety   - Patient also worried about elevated BP      Checks couple times a day, always high in the AM but not >150/95      Discussed if could be elevated in AM because his back hurts, states very possible        Could also be higher along with pulses when feeling very anxious      Recommend continue to check, but make sure to do later in AM when pain is better after moving and stretching        The patient indicates understanding of these issues and agrees with the plan.    Return in about 1 month (around 10/9/2020).    Sarah Hickey PA-C  [facility]

## 2020-09-09 ENCOUNTER — OFFICE VISIT (OUTPATIENT)
Dept: FAMILY MEDICINE | Facility: OTHER | Age: 85
End: 2020-09-09
Payer: MEDICARE

## 2020-09-09 VITALS
HEART RATE: 96 BPM | DIASTOLIC BLOOD PRESSURE: 90 MMHG | RESPIRATION RATE: 22 BRPM | TEMPERATURE: 98.2 F | WEIGHT: 170 LBS | OXYGEN SATURATION: 99 % | SYSTOLIC BLOOD PRESSURE: 140 MMHG | BODY MASS INDEX: 27.32 KG/M2 | HEIGHT: 66 IN

## 2020-09-09 DIAGNOSIS — F41.1 GAD (GENERALIZED ANXIETY DISORDER): Primary | ICD-10-CM

## 2020-09-09 DIAGNOSIS — R03.0 ELEVATED BP WITHOUT DIAGNOSIS OF HYPERTENSION: ICD-10-CM

## 2020-09-09 PROCEDURE — 99214 OFFICE O/P EST MOD 30 MIN: CPT | Performed by: PHYSICIAN ASSISTANT

## 2020-09-09 RX ORDER — HYDROXYZINE HYDROCHLORIDE 25 MG/1
25 TABLET, FILM COATED ORAL 3 TIMES DAILY PRN
Qty: 30 TABLET | Refills: 3 | Status: SHIPPED | OUTPATIENT
Start: 2020-09-09 | End: 2021-04-07

## 2020-09-09 ASSESSMENT — ANXIETY QUESTIONNAIRES
3. WORRYING TOO MUCH ABOUT DIFFERENT THINGS: MORE THAN HALF THE DAYS
5. BEING SO RESTLESS THAT IT IS HARD TO SIT STILL: NOT AT ALL
1. FEELING NERVOUS, ANXIOUS, OR ON EDGE: NEARLY EVERY DAY
7. FEELING AFRAID AS IF SOMETHING AWFUL MIGHT HAPPEN: SEVERAL DAYS
2. NOT BEING ABLE TO STOP OR CONTROL WORRYING: NEARLY EVERY DAY
IF YOU CHECKED OFF ANY PROBLEMS ON THIS QUESTIONNAIRE, HOW DIFFICULT HAVE THESE PROBLEMS MADE IT FOR YOU TO DO YOUR WORK, TAKE CARE OF THINGS AT HOME, OR GET ALONG WITH OTHER PEOPLE: NOT DIFFICULT AT ALL
6. BECOMING EASILY ANNOYED OR IRRITABLE: SEVERAL DAYS
GAD7 TOTAL SCORE: 11

## 2020-09-09 ASSESSMENT — PATIENT HEALTH QUESTIONNAIRE - PHQ9
SUM OF ALL RESPONSES TO PHQ QUESTIONS 1-9: 4
5. POOR APPETITE OR OVEREATING: SEVERAL DAYS

## 2020-09-09 ASSESSMENT — MIFFLIN-ST. JEOR: SCORE: 1406.11

## 2020-09-09 NOTE — PATIENT INSTRUCTIONS
1.  Increase Sertraline (Zoloft) to 50 mg        2 tablets of what you have at home and then your new prescription will have 50 mg in 1 tablet     2. Hydroxyzine - 1 tablet as needed up to 3x/day for anxiety     3. Continue to monitor blood pressure and pulses     4. Recheck in 1 month

## 2020-09-10 ASSESSMENT — ANXIETY QUESTIONNAIRES: GAD7 TOTAL SCORE: 11

## 2020-09-28 ENCOUNTER — MEDICAL CORRESPONDENCE (OUTPATIENT)
Dept: HEALTH INFORMATION MANAGEMENT | Facility: CLINIC | Age: 85
End: 2020-09-28

## 2020-10-06 NOTE — PROGRESS NOTES
Subjective     Luther Kam is a 85 year old male who presents to clinic today for the following health issues:    HPI         Elevated BP Follow-up      Do you check your blood pressure regularly outside of the clinic? Yes     Are you following a low salt diet? Yes    Are your blood pressures ever more than 140 on the top number (systolic) OR more   than 90 on the bottom number (diastolic), for example 140/90? No     - 110/82, pulse 90   - 124/78 p 107  - 114/84, p 99  - 120/89, p 102     Anxiety Follow-Up    How are you doing with your anxiety since your last visit? Improved     Are you having other symptoms that might be associated with anxiety? No    Have you had a significant life event? No     Are you feeling depressed? No    Do you have any concerns with your use of alcohol or other drugs? No    - Hydroxyzine - not every day, did help   - Mostly 1 but maybe a couple days did 2   - Sleeps good       Social History     Tobacco Use     Smoking status: Former Smoker     Packs/day: 0.50     Types: Cigarettes     Smokeless tobacco: Never Used     Tobacco comment: socially, not daily   Substance Use Topics     Alcohol use: Not Currently     Comment: none      Drug use: No     ABHIJEET-7 SCORE 4/3/2018 9/9/2020 10/13/2020   Total Score 7 11 10     PHQ 4/3/2018 9/9/2020   PHQ-9 Total Score 6 4   Q9: Thoughts of better off dead/self-harm past 2 weeks Not at all Several days         Review of Systems   Constitutional, HEENT, cardiovascular, pulmonary, gi and gu systems are negative, except as otherwise noted.      Objective    /80   Pulse 85   Temp 98.5  F (36.9  C) (Temporal)   Resp 20   Wt 75.3 kg (166 lb)   SpO2 98%   BMI 26.68 kg/m    Body mass index is 26.68 kg/m .  Physical Exam   GENERAL APPEARANCE: healthy, alert and no distress  EYES: Eyes grossly normal to inspection, PERRLA, conjunctivae and sclerae without injection or discharge, EOM intact   RESP: Lungs clear to auscultation - no rales, rhonchi or  wheezes    CV: Regular rates and rhythm, normal S1 S2, no S3 or S4, no murmur, click or rub, no peripheral edema and peripheral pulses strong and symmetric bilaterally   MS: No musculoskeletal defects are noted and gait is age appropriate without ataxia   SKIN: No suspicious lesions or rashes, hydration status appears adeuqate with normal skin turgor   PSYCH: Alert and oriented x3; speech- coherent , normal rate and volume; able to articulate logical thoughts, able to abstract reason, no tangential thoughts, no hallucinations or delusions, mentation appears normal, Mood is euthymic. Affect is appropriate for this mood state and bright. Thought content is free of suicidal ideation, hallucinations, and delusions. Dress is adequate and upkept. Eye contact is good during conversation.       Diagnostics  None         Assessment & Plan     ICD-10-CM    1. ABHIJEET (generalized anxiety disorder)  F41.1 sertraline (ZOLOFT) 50 MG tablet   2. 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 Elevated blood pressure reading without diagnosis of hypertension  R03.0    4. Need for prophylactic vaccination and inoculation against influenza  Z23 FLUZONE HIGH DOSE 65+  [65013]     1. Anxiety   - Improving with increase of Sertraline to 50 mg   - Discussed further increasing vs. Giving more time, patient would like to give more time   - Using Hydroxyzine 25 mg, just 1 tablet at a time, not every day, 1-2 when very anxious and helps   - Reviewed both use and side effects, refilled as needed     2. Elevated BP   - Mainly just when here in clinic, good readings at home, will scan his log into chart   - Continue to monitor    3. Flu shot given     The patient indicates understanding of these issues and agrees with the plan.    Return in about 1 month (around 11/13/2020).    SANYA Montero [facility]

## 2020-10-07 ENCOUNTER — TELEPHONE (OUTPATIENT)
Dept: FAMILY MEDICINE | Facility: OTHER | Age: 85
End: 2020-10-07

## 2020-10-07 DIAGNOSIS — G89.29 CHRONIC BILATERAL LOW BACK PAIN WITHOUT SCIATICA: ICD-10-CM

## 2020-10-07 DIAGNOSIS — M54.50 CHRONIC BILATERAL LOW BACK PAIN WITHOUT SCIATICA: ICD-10-CM

## 2020-10-07 RX ORDER — HYDROCODONE BITARTRATE AND ACETAMINOPHEN 5; 325 MG/1; MG/1
1-2 TABLET ORAL EVERY 8 HOURS PRN
Qty: 90 TABLET | Refills: 0 | Status: SHIPPED | OUTPATIENT
Start: 2020-10-07 | End: 2020-12-07

## 2020-10-07 NOTE — TELEPHONE ENCOUNTER
I'm not out, I'm virtual only.     OK for refill. Sent.     Ruddy Hickey PA-C  [facility]

## 2020-10-13 ENCOUNTER — OFFICE VISIT (OUTPATIENT)
Dept: FAMILY MEDICINE | Facility: OTHER | Age: 85
End: 2020-10-13
Payer: MEDICARE

## 2020-10-13 VITALS
DIASTOLIC BLOOD PRESSURE: 80 MMHG | SYSTOLIC BLOOD PRESSURE: 138 MMHG | TEMPERATURE: 98.5 F | WEIGHT: 166 LBS | BODY MASS INDEX: 26.68 KG/M2 | OXYGEN SATURATION: 98 % | HEART RATE: 85 BPM | RESPIRATION RATE: 20 BRPM

## 2020-10-13 DIAGNOSIS — Z23 NEED FOR PROPHYLACTIC VACCINATION AND INOCULATION AGAINST INFLUENZA: ICD-10-CM

## 2020-10-13 DIAGNOSIS — F51.04 PSYCHOPHYSIOLOGICAL INSOMNIA: ICD-10-CM

## 2020-10-13 DIAGNOSIS — F41.1 GAD (GENERALIZED ANXIETY DISORDER): Primary | ICD-10-CM

## 2020-10-13 DIAGNOSIS — R03.0 ELEVATED BLOOD PRESSURE READING WITHOUT DIAGNOSIS OF HYPERTENSION: ICD-10-CM

## 2020-10-13 PROCEDURE — G0008 ADMIN INFLUENZA VIRUS VAC: HCPCS | Performed by: PHYSICIAN ASSISTANT

## 2020-10-13 PROCEDURE — 99214 OFFICE O/P EST MOD 30 MIN: CPT | Mod: 25 | Performed by: PHYSICIAN ASSISTANT

## 2020-10-13 PROCEDURE — 90662 IIV NO PRSV INCREASED AG IM: CPT | Performed by: PHYSICIAN ASSISTANT

## 2020-10-13 ASSESSMENT — ANXIETY QUESTIONNAIRES
5. BEING SO RESTLESS THAT IT IS HARD TO SIT STILL: SEVERAL DAYS
6. BECOMING EASILY ANNOYED OR IRRITABLE: SEVERAL DAYS
4. TROUBLE RELAXING: SEVERAL DAYS
GAD7 TOTAL SCORE: 10
IF YOU CHECKED OFF ANY PROBLEMS ON THIS QUESTIONNAIRE, HOW DIFFICULT HAVE THESE PROBLEMS MADE IT FOR YOU TO DO YOUR WORK, TAKE CARE OF THINGS AT HOME, OR GET ALONG WITH OTHER PEOPLE: SOMEWHAT DIFFICULT
7. FEELING AFRAID AS IF SOMETHING AWFUL MIGHT HAPPEN: SEVERAL DAYS
3. WORRYING TOO MUCH ABOUT DIFFERENT THINGS: SEVERAL DAYS
1. FEELING NERVOUS, ANXIOUS, OR ON EDGE: MORE THAN HALF THE DAYS
2. NOT BEING ABLE TO STOP OR CONTROL WORRYING: NEARLY EVERY DAY

## 2020-10-14 ASSESSMENT — ANXIETY QUESTIONNAIRES: GAD7 TOTAL SCORE: 10

## 2020-11-28 NOTE — PROGRESS NOTES
Subjective     Luther Kam is a 85 year old male who presents to clinic today for the following health issues:    HPI         Anxiety Follow-Up    How are you doing with your anxiety since your last visit? Improved     Are you having other symptoms that might be associated with anxiety? No    Have you had a significant life event? No     Are you feeling depressed? No    Do you have any concerns with your use of alcohol or other drugs? No     BP at home - Normal, just high because anxious about being here     Social History     Tobacco Use     Smoking status: Former Smoker     Packs/day: 0.50     Types: Cigarettes     Smokeless tobacco: Never Used     Tobacco comment: socially, not daily   Substance Use Topics     Alcohol use: Not Currently     Comment: none      Drug use: No     ABHIJEET-7 SCORE 9/9/2020 10/13/2020 12/7/2020   Total Score 11 10 4     PHQ 4/3/2018 9/9/2020   PHQ-9 Total Score 6 4   Q9: Thoughts of better off dead/self-harm past 2 weeks Not at all Several days           Plan from last visit 9/9/20   - Patient reports hasn't really felt any change with starting Sertraline 25 mg once a day   - Still having some panic feelings, wondering if there is anything quick acting to try      Discussed with his pain medication for his back, benzos are not indicated, also not indicated due to his age      Discussed trial of Hydroxyzine, reviewed use and side effects   - Will also increase Sertraline to 50 mg, discussed use and side effects     Discussed will take time for this to really work in him   - Will continue to recheck monthly   - Patient reports passing SI but no plan, verbally consents to safety   - Patient also worried about elevated BP      Checks couple times a day, always high in the AM but not >150/95      Discussed if could be elevated in AM because his back hurts, states very possible        Could also be higher along with pulses when feeling very anxious      Recommend continue to check, but make sure  to do later in AM when pain is better after moving and stretching          Review of Systems   Constitutional, HEENT, cardiovascular, pulmonary, gi and gu systems are negative, except as otherwise noted.      Objective    BP (!) 150/84   Pulse 76   Temp 98.4  F (36.9  C) (Temporal)   Wt 74.4 kg (164 lb)   SpO2 99%   BMI 26.36 kg/m    Body mass index is 26.36 kg/m .  Physical Exam   GENERAL APPEARANCE: healthy, alert and no distress  EYES: Eyes grossly normal to inspection, PERRLA, conjunctivae and sclerae without injection or discharge, EOM intact   RESP: Lungs clear to auscultation - no rales, rhonchi or wheezes    CV: Regular rates and rhythm, normal S1 S2, no S3 or S4, no murmur, click or rub, no peripheral edema and peripheral pulses strong and symmetric bilaterally   MS: No musculoskeletal defects are noted and gait is age appropriate without ataxia   SKIN: No suspicious lesions or rashes, hydration status appears adeuqate with normal skin turgor   PSYCH: Alert and oriented x3; speech- coherent , normal rate and volume; able to articulate logical thoughts, able to abstract reason, no tangential thoughts, no hallucinations or delusions, mentation appears normal, Mood is euthymic. Affect is appropriate for this mood state and bright. Thought content is free of suicidal ideation, hallucinations, and delusions. Dress is adequate and upkept. Eye contact is good during conversation.       Diagnostics  None         Assessment & Plan     ICD-10-CM    1. ABHIJEET (generalized anxiety disorder)  F41.1 sertraline (ZOLOFT) 50 MG tablet   2. Elevated blood pressure reading without diagnosis of hypertension  R03.0    3. Chronic bilateral low back pain without sciatica  M54.5 HYDROcodone-acetaminophen (NORCO) 5-325 MG tablet    G89.29         1. Anxiety   - Patient reports marked improvement in anxiety with Sertraline 50 mg and no side effects     Feels he is handling stress a lot better than used to  - Patient reports  "passing SI but no plan, verbally consents to safety      \"just the thoughts of an old man, but I have to keep living for my wife\"   - Will treat as stable   - Still has Hydroxyzine on hand to use as needed   - Reviewed medication use and side effects, refilled   - Recheck 6 months or as needed     2. BP   - We have been monitoring, as has patient at home, reports always normal 120/80 at home as of late   - A little high here today, but patient is always anxious when in clinic   - Discussed at this time I do not recommend medication due to risk of hypotension and falls   - Will just continue to monitor at home and here     3. Back pain   -  Chronic for patient, recovering from compression fracture of L1   - Stable on occasional Norco use (<1.5 per day), also instructed on Ibuprofen use  - Reviewed both use and side effects along with top dose, patient will continue to use sparingly, may cause sedation, addiction, and constipation, no driving on this medication   - Fall precautions discussed, patient aware, states they don't make him tired (does live alone)   -  reviewed, no concerns  - CSA - 8/5/19 will update next visit      Hydrocodone-APAP 5-325 mg, #90/3 months, <1 per day        The patient indicates understanding of these issues and agrees with the plan.    Return in about 6 months (around 6/7/2021).    Sarah Hickey PA-C  [facility]    "

## 2020-12-07 ENCOUNTER — OFFICE VISIT (OUTPATIENT)
Dept: FAMILY MEDICINE | Facility: OTHER | Age: 85
End: 2020-12-07
Payer: MEDICARE

## 2020-12-07 VITALS
SYSTOLIC BLOOD PRESSURE: 150 MMHG | OXYGEN SATURATION: 99 % | HEART RATE: 76 BPM | TEMPERATURE: 98.4 F | WEIGHT: 164 LBS | BODY MASS INDEX: 26.36 KG/M2 | DIASTOLIC BLOOD PRESSURE: 84 MMHG

## 2020-12-07 DIAGNOSIS — F41.1 GAD (GENERALIZED ANXIETY DISORDER): Primary | ICD-10-CM

## 2020-12-07 DIAGNOSIS — R03.0 ELEVATED BLOOD PRESSURE READING WITHOUT DIAGNOSIS OF HYPERTENSION: ICD-10-CM

## 2020-12-07 DIAGNOSIS — M54.50 CHRONIC BILATERAL LOW BACK PAIN WITHOUT SCIATICA: ICD-10-CM

## 2020-12-07 DIAGNOSIS — G89.29 CHRONIC BILATERAL LOW BACK PAIN WITHOUT SCIATICA: ICD-10-CM

## 2020-12-07 PROCEDURE — 99214 OFFICE O/P EST MOD 30 MIN: CPT | Performed by: PHYSICIAN ASSISTANT

## 2020-12-07 RX ORDER — HYDROCODONE BITARTRATE AND ACETAMINOPHEN 5; 325 MG/1; MG/1
1-2 TABLET ORAL EVERY 8 HOURS PRN
Qty: 90 TABLET | Refills: 0 | Status: SHIPPED | OUTPATIENT
Start: 2020-12-07 | End: 2021-02-05

## 2020-12-07 ASSESSMENT — ANXIETY QUESTIONNAIRES
6. BECOMING EASILY ANNOYED OR IRRITABLE: NOT AT ALL
IF YOU CHECKED OFF ANY PROBLEMS ON THIS QUESTIONNAIRE, HOW DIFFICULT HAVE THESE PROBLEMS MADE IT FOR YOU TO DO YOUR WORK, TAKE CARE OF THINGS AT HOME, OR GET ALONG WITH OTHER PEOPLE: NOT DIFFICULT AT ALL
2. NOT BEING ABLE TO STOP OR CONTROL WORRYING: SEVERAL DAYS
1. FEELING NERVOUS, ANXIOUS, OR ON EDGE: SEVERAL DAYS
3. WORRYING TOO MUCH ABOUT DIFFERENT THINGS: SEVERAL DAYS
5. BEING SO RESTLESS THAT IT IS HARD TO SIT STILL: NOT AT ALL
7. FEELING AFRAID AS IF SOMETHING AWFUL MIGHT HAPPEN: NOT AT ALL
GAD7 TOTAL SCORE: 4

## 2020-12-07 ASSESSMENT — PATIENT HEALTH QUESTIONNAIRE - PHQ9: 5. POOR APPETITE OR OVEREATING: SEVERAL DAYS

## 2020-12-08 ASSESSMENT — ANXIETY QUESTIONNAIRES: GAD7 TOTAL SCORE: 4

## 2021-02-05 DIAGNOSIS — M54.50 CHRONIC BILATERAL LOW BACK PAIN WITHOUT SCIATICA: ICD-10-CM

## 2021-02-05 DIAGNOSIS — G89.29 CHRONIC BILATERAL LOW BACK PAIN WITHOUT SCIATICA: ICD-10-CM

## 2021-02-05 RX ORDER — HYDROCODONE BITARTRATE AND ACETAMINOPHEN 5; 325 MG/1; MG/1
1-2 TABLET ORAL EVERY 8 HOURS PRN
Qty: 90 TABLET | Refills: 0 | Status: SHIPPED | OUTPATIENT
Start: 2021-02-05 | End: 2021-04-07

## 2021-02-05 NOTE — TELEPHONE ENCOUNTER
Reason for Call:  Medication or medication refill:    Do you use a [facility]?  Name of the pharmacy and phone number for the current request:  [facility] - [phone]    Name of the medication requested:HYDROcodone-acetaminophen (NORCO) 5-325 MG tablet    Other request: pt needs refill,pt wants someone to do this today    Can we leave a detailed message on this number? YES    Phone number patient can be reached at: Home number on file [phone] (home)    Best Time: any    Call taken on 2/5/2021 at 9:37 AM by Sandra Baptiste

## 2021-02-05 NOTE — TELEPHONE ENCOUNTER
No further refills without office visit.  Needs to see primary care provider to go over controlled substance agreement once again   electronically signed:    Fred Mitchell PA-C

## 2021-04-02 NOTE — PROGRESS NOTES
"Assessment & Plan     ICD-10-CM    1. Chronic bilateral low back pain without sciatica  M54.5 HYDROcodone-acetaminophen (NORCO) 5-325 MG tablet    G89.29 Drug Confirmation Panel Urine with Creat     CANCELED: [id number] - Urine Drug Confirmation Panel (Comprehensive)   2. Elevated blood pressure reading without diagnosis of hypertension  R03.0    3. ABHIJEET (generalized anxiety disorder)  F41.1        1. Back issues   -  Chronic for patient, recovering from compression fracture of L1   - Stable on occasional Norco use (<1.5 per day), also instructed on Ibuprofen use  - Reviewed both use and side effects along with top dose, patient will continue to use sparingly, may cause sedation, addiction, and constipation, no driving on this medication   - Fall precautions discussed, patient aware, states they don't make him tired (does live alone)   -  reviewed, no concerns  - CSA - 8/5/19, discussed and updated today (4/7/21)      Hydrocodone-APAP 5-325 mg, #90/3 months, <1 per day  - Utox will also be updated today, await results     2. BP   - Stable, always elevated in clinic, gets nervous, getting stable readings at home     3. Anxiety   - Patient reports weaned himself off all medications, mainly was able to do so after him and his wife were able to get COVID vaccines, and now that he is more comfortable with the \"state of things\"   - Continue to monitor     Review of the result(s) of each unique test - None    Diagnosis or treatment significantly limited by social determinants of health - None     20 minutes spent on the date of the encounter doing chart review, history and exam, documentation and further activities as noted above    The patient indicates understanding of these issues and agrees with the plan.    Return in about 3 months (around 7/7/2021).    [facility] is a 86 year old who presents for the following health issues     HPI " "  Anxiety Follow-Up    How are you doing with your anxiety since your last visit? Improved     Are you having other symptoms that might be associated with anxiety? No    Have you had a significant life event? No     Are you feeling depressed? No    Do you have any concerns with your use of alcohol or other drugs? No     - Stopped anxiety pills   - Doing ok without     <1.5 per day     - BP at home has been normal, 128/85 on average    - Walks with a shuffle, drops things, worried he has parkinson's     Resting tremor?      Chronic Pain Follow-Up  Where in your body do you have pain? Back, knees, feet.   How has your pain affected your ability to work? Not applicable   Which of these pain treatments have you tried since your last clinic visit? Other: none  How well are you sleeping? Good  How has your mood been since your last visit? Better - has been weaning himself off of his medication and felt he didn't need it anymore.   Have you had a significant life event? No  Other aggravating factors: lifting or moving the wrong way.   Taking medication as directed? Yes          PHQ-9 SCORE 4/3/2018 9/9/2020 4/7/2021   PHQ-9 Total Score 6 4 4     ABHIJEET-7 SCORE 10/13/2020 12/7/2020 4/7/2021   Total Score 10 4 6     No flowsheet data found.  Encounter-Level CSA - 08/24/2018:    Controlled Substance Agreement - Scan on 8/30/2018  2:48 PM: CONTROLLED SUBSTANCE AGREEMENT     Patient-Level CSA:    Controlled Substance Agreement - Opioid - Scan on 8/5/2019  3:04 PM: Opiod/Opiod Plus Controlled Substance Agreement 08/05/19            Review of Systems   Constitutional, HEENT, cardiovascular, pulmonary, gi and gu systems are negative, except as otherwise noted.      Objective    BP (!) 164/100   Pulse 92   Temp 98.2  F (36.8  C) (Temporal)   Resp 16   Ht 1.68 m (5' 6.14\")   Wt 73.8 kg (162 lb 9.6 oz)   SpO2 99%   BMI 26.13 kg/m    Body mass index is 26.13 kg/m .  Physical Exam   GENERAL APPEARANCE: healthy, alert and no " distress  EYES: Eyes grossly normal to inspection, PERRLA, conjunctivae and sclerae without injection or discharge, EOM intact   MS: No musculoskeletal defects are noted and gait is age appropriate without ataxia   SKIN: No suspicious lesions or rashes, hydration status appears adeuqate with normal skin turgor   PSYCH: Alert and oriented x3; speech- coherent , normal rate and volume; able to articulate logical thoughts, able to abstract reason, no tangential thoughts, no hallucinations or delusions, mentation appears normal, Mood is euthymic. Affect is appropriate for this mood state and bright. Thought content is free of suicidal ideation, hallucinations, and delusions. Dress is adequate and upkept. Eye contact is good during conversation.         Diagnostics   See orders pending in epic

## 2021-04-07 ENCOUNTER — OFFICE VISIT (OUTPATIENT)
Dept: FAMILY MEDICINE | Facility: OTHER | Age: 86
End: 2021-04-07
Payer: MEDICARE

## 2021-04-07 VITALS
RESPIRATION RATE: 16 BRPM | SYSTOLIC BLOOD PRESSURE: 142 MMHG | WEIGHT: 162.6 LBS | OXYGEN SATURATION: 99 % | TEMPERATURE: 98.2 F | HEIGHT: 66 IN | DIASTOLIC BLOOD PRESSURE: 88 MMHG | HEART RATE: 92 BPM | BODY MASS INDEX: 26.13 KG/M2

## 2021-04-07 DIAGNOSIS — R03.0 ELEVATED BLOOD PRESSURE READING WITHOUT DIAGNOSIS OF HYPERTENSION: ICD-10-CM

## 2021-04-07 DIAGNOSIS — G89.29 CHRONIC BILATERAL LOW BACK PAIN WITHOUT SCIATICA: Primary | ICD-10-CM

## 2021-04-07 DIAGNOSIS — M54.50 CHRONIC BILATERAL LOW BACK PAIN WITHOUT SCIATICA: Primary | ICD-10-CM

## 2021-04-07 DIAGNOSIS — F41.1 GAD (GENERALIZED ANXIETY DISORDER): ICD-10-CM

## 2021-04-07 PROBLEM — S32.010D COMPRESSION FRACTURE OF L1 LUMBAR VERTEBRA, WITH ROUTINE HEALING, SUBSEQUENT ENCOUNTER: Status: RESOLVED | Noted: 2017-08-18 | Resolved: 2021-04-07

## 2021-04-07 PROCEDURE — 80307 DRUG TEST PRSMV CHEM ANLYZR: CPT | Performed by: PHYSICIAN ASSISTANT

## 2021-04-07 PROCEDURE — 99213 OFFICE O/P EST LOW 20 MIN: CPT | Performed by: PHYSICIAN ASSISTANT

## 2021-04-07 RX ORDER — HYDROCODONE BITARTRATE AND ACETAMINOPHEN 5; 325 MG/1; MG/1
1-2 TABLET ORAL EVERY 8 HOURS PRN
Qty: 90 TABLET | Refills: 0 | Status: SHIPPED | OUTPATIENT
Start: 2021-04-07 | End: 2021-06-09

## 2021-04-07 ASSESSMENT — ANXIETY QUESTIONNAIRES
5. BEING SO RESTLESS THAT IT IS HARD TO SIT STILL: SEVERAL DAYS
3. WORRYING TOO MUCH ABOUT DIFFERENT THINGS: SEVERAL DAYS
7. FEELING AFRAID AS IF SOMETHING AWFUL MIGHT HAPPEN: SEVERAL DAYS
GAD7 TOTAL SCORE: 6
1. FEELING NERVOUS, ANXIOUS, OR ON EDGE: NOT AT ALL
2. NOT BEING ABLE TO STOP OR CONTROL WORRYING: SEVERAL DAYS
6. BECOMING EASILY ANNOYED OR IRRITABLE: SEVERAL DAYS

## 2021-04-07 ASSESSMENT — PATIENT HEALTH QUESTIONNAIRE - PHQ9
SUM OF ALL RESPONSES TO PHQ QUESTIONS 1-9: 4
5. POOR APPETITE OR OVEREATING: SEVERAL DAYS

## 2021-04-07 ASSESSMENT — MIFFLIN-ST. JEOR: SCORE: 1367.55

## 2021-04-07 NOTE — LETTER
Opioid / Opioid Plus Controlled Substance Agreement    This is an agreement between you and your provider about the safe and appropriate use of controlled substance/opioids prescribed by your care team. Controlled substances are medicines that can cause physical and mental dependence (abuse).    There are strict laws about having and using these medicines. We here at [facility] are committing to working with you in your efforts to get better. To support you in this work, we ll help you schedule regular office appointments for medicine refills. If we must cancel or change your appointment for any reason, we ll make sure you have enough medicine to last until your next appointment.     As a Provider, I will:    Listen carefully to your concerns and treat you with respect.     Recommend a treatment plan that I believe is in your best interest. This plan may involve therapies other than opioid pain medication.     Talk with you often about the possible benefits, and the risk of harm of any medicine that we prescribe for you.     Provide a plan on how to taper (discontinue or go off) using this medicine if the decision is made to stop its use.    As a Patient, I understand that opioid(s):     Are a controlled substance prescribed by my care team to help me function or work and manage my condition(s).     Are strong medicines and can cause serious side effects such as:    Drowsiness, which can seriously affect my driving ability    A lower breathing rate, enough to cause death    Harm to my thinking ability     Depression     Abuse of and addiction to this medicine    Need to be taken exactly as prescribed. Combining opioids with certain medicines or chemicals (such as illegal drugs, sedatives, sleeping pills, and benzodiazepines) can be dangerous or even fatal. If I stop opioids suddenly, I may have severe withdrawal symptoms.    Do not work for all types of pain nor for all patients. If they re not helpful, I may  be asked to stop them.      The risks, benefits and side effects of these medicine(s) were explained to me. I agree that:  1. I will take part in other treatments as advised by my care team. This may be psychiatry or counseling, physical therapy, behavioral therapy, group treatment or a referral to a specialist.     2. I will keep all my appointments. I understand that this is part of the monitoring of opioids. My care team may require an office visit for EVERY opioid/controlled substance refill. If I miss appointments or don t follow instructions, my care team may stop my medicine.    3. I will take my medicines as prescribed. I will not change the dose or schedule unless my care team tells me to. There will be no refills if I run out early.     4. I may be asked to come to the clinic and complete a urine drug test or complete a pill count at any time. If I don t give a urine sample or participate in a pill count, the care team may stop my medicine.    5. I will only receive prescriptions from this clinic for chronic pain. If I am treated by another provider for acute pain issues, I will tell them that I am taking opioid pain medication for chronic pain and that I have a treatment agreement with this provider. I will inform my [facility] care team within one business day if I am given a prescription for any pain medication by another healthcare provider. My [facility] care team can contact other providers and pharmacists about my use of any medicines.    6. It is up to me to make sure that I don t run out of my medicines on weekends or holidays. If my care team is willing to refill my opioid prescription without a visit, I must request refills only during office hours. Refills may take up to 3 business days to process. I will use one pharmacy to fill all my opioid and other controlled substance prescriptions. I will notify the clinic about any changes to my insurance or medication  availability.    7. I am responsible for my prescriptions. If the medicine/prescription is lost, stolen or destroyed, it will not be replaced. I also agree not to share controlled substance medicines with anyone.    8. I am aware I should not use any illegal or recreational drugs. I agree not to drink alcohol unless my care team says I can.       9. If I enroll in the [place] Medical Cannabis program, I will tell my care team prior to my next refill.     10. I will tell my care team right away if I become pregnant, have a new medical problem treated outside of my regular clinic, or have a change in my medications.    11. I understand that this medicine can affect my thinking, judgment and reaction time. Alcohol and drugs affect the brain and body, which can affect the safety of my driving. Being under the influence of alcohol or drugs can affect my decision-making, behaviors, personal safety, and the safety of others. Driving while impaired (DWI) can occur if a person is driving, operating, or in physical control of a car, motorcycle, boat, snowmobile, ATV, motorbike, off-road vehicle, or any other motor vehicle ([place] Statute 169A.20). I understand the risk if I choose to drive or operate any vehicle or machinery.    I understand that if I do not follow any of the conditions above, my prescriptions or treatment may be stopped or changed.          Opioids  What You Need to Know    What are opioids?   Opioids are pain medicines that must be prescribed by a doctor. They are also known as narcotics.     Examples are:   1. morphine (MS Contin, Irena)  2. oxycodone (Oxycontin)  3. oxycodone and acetaminophen (Percocet)  4. hydrocodone and acetaminophen (Vicodin, Norco)   5. fentanyl patch (Duragesic)   6. hydromorphone (Dilaudid)   7. methadone  8. codeine (Tylenol #3)     What do opioids do well?   Opioids are best for severe short-term pain such as after a surgery or injury. They may work well for cancer pain. They may  help some people with long-lasting (chronic) pain.     What do opioids NOT do well?   Opioids never get rid of pain entirely, and they don t work well for most patients with chronic pain. Opioids don t reduce swelling, one of the causes of pain.                                    Other ways to manage chronic pain and improve function include:       Treat the health problem that may be causing pain    Anti-inflammation medicines, which reduce swelling and tenderness, such as ibuprofen (Advil, Motrin) or naproxen (Aleve)    Acetaminophen (Tylenol)    Antidepressants and anti-seizure medicines, especially for nerve pain    Topical treatments such as patches or creams    Injections or nerve blocks    Chiropractic or osteopathic treatment    Acupuncture, massage, deep breathing, meditation, visual imagery, aromatherapy    Use heat or ice at the pain site    Physical therapy     Exercise    Stop smoking    Take part in therapy       Risks and side effects     Talk to your doctor before you start or decide to keep taking opioids. Possible side effects include:      Lowering your breathing rate enough to cause death    Overdose, including death, especially if taking higher than prescribed doses    Worse depression symptoms; less pleasure in things you usually enjoy    Feeling tired or sluggish    Slower thoughts or cloudy thinking    Being more sensitive to pain over time; pain is harder to control    Trouble sleeping or restless sleep    Changes in hormone levels (for example, less testosterone)    Changes in sex drive or ability to have sex    Constipation    Unsafe driving    Itching and sweating    Dizziness    Nausea, throwing up and dry mouth    What else should I know about opioids?    Opioids may lead to dependence, tolerance, or addiction.      Dependence means that if you stop or reduce the medicine too quickly, you will have withdrawal symptoms. These include loose poop (diarrhea), jitters, flu-like symptoms,  nervousness and tremors. Dependence is not the same as addiction.                       Tolerance means needing higher doses over time to get the same effect. This may increase the chance of serious side effects.      Addiction is when people improperly use a substance that harms their body, their mind or their relations with others. Use of opiates can cause a relapse of addiction if you have a history of drug or alcohol abuse.      People who have used opioids for a long time may have a lower quality of life, worse depression, higher levels of pain and more visits to doctors.    You can overdose on opioids. Take these steps to lower your risk of overdose:    1. Recognize the signs:  Signs of overdose include decrease or loss of consciousness (blackout), slowed breathing, trouble waking up and blue lips. If someone is worried about overdose, they should call 911.    2. Talk to your doctor about Narcan (naloxone).   If you are at risk for overdose, you may be given a prescription for Narcan. This medicine very quickly reverses the effects of opioids.   If you overdose, a friend or family member can give you Narcan while waiting for the ambulance. They need to know the signs of overdose and how to give Narcan.     3. Don't use alcohol or street drugs.   Taking them with opioids can cause death.    4. Do not take any of these medicines unless your doctor says it s OK. Taking these with opioids can cause death:    Benzodiazepines, such as lorazepam (Ativan), alprazolam (Xanax) or diazepam (Valium)    Muscle relaxers, such as cyclobenzaprine (Flexeril)    Sleeping pills like zolpidem (Ambien)     Other opioids      How to keep you and other people safe while taking opioids:    1. Never share your opioids with others.  Opioid medicines are regulated by the Drug Enforcement Agency (MER). Selling or sharing medications is a criminal act.    2. Be sure to store opioids in a secure place, locked up if possible. Young children  can easily swallow them and overdose.    3. When you are traveling with your medicines, keep them in the original bottles. If you use a pill box, be sure you also carry a copy of your medicine list from your clinic or pharmacy.    4. Safe disposal of opioids    Most pharmacies have places to get rid of medicine, called disposal kiosks. Medicine disposal options are also available in every [facility]. Search your county and  medication disposal  to find more options. You can find more details at:  [email or web address]-[email or web address]-[email or web address]     I agree that my provider, clinic care team, and pharmacy may work with any city, state or federal law enforcement agency that investigates the misuse, sale, or other diversion of my controlled medicine. I will allow my provider to discuss my care with, or share a copy of, this agreement with any other treating provider, pharmacy or emergency room where I receive care.    I have read this agreement and have asked questions about anything I did not understand.    _______________________________________________________  Patient Signature - Luther Kam _____________________                   Date     _______________________________________________________  Provider Signature - Sarah Hickey PA-C   _____________________                   Date     _______________________________________________________  Witness Signature (required if provider not present while patient signing)   _____________________                   Date

## 2021-04-08 ASSESSMENT — ANXIETY QUESTIONNAIRES: GAD7 TOTAL SCORE: 6

## 2021-04-09 LAB
CREAT UR-MCNC: 85 MG/DL
DHC UR CFM-MCNC: 111 NG/ML
DHC/CREAT UR: 131 NG/MG{CREAT}
HYDROCODONE UR CFM-MCNC: 156 NG/ML
HYDROCODONE/CREAT UR: 184 NG/MG{CREAT}
HYDROMORPHONE UR CFM-MCNC: 56 NG/ML
HYDROMORPHONE/CREAT UR: 66 NG/MG{CREAT}
RPT COMMENT: ABNORMAL

## 2021-04-12 ENCOUNTER — ALLIED HEALTH/NURSE VISIT (OUTPATIENT)
Dept: FAMILY MEDICINE | Facility: OTHER | Age: 86
End: 2021-04-12
Payer: MEDICARE

## 2021-04-12 ENCOUNTER — OFFICE VISIT (OUTPATIENT)
Dept: FAMILY MEDICINE | Facility: OTHER | Age: 86
End: 2021-04-12
Payer: MEDICARE

## 2021-04-12 VITALS
WEIGHT: 162 LBS | OXYGEN SATURATION: 97 % | SYSTOLIC BLOOD PRESSURE: 119 MMHG | DIASTOLIC BLOOD PRESSURE: 86 MMHG | BODY MASS INDEX: 26.03 KG/M2 | HEART RATE: 102 BPM | TEMPERATURE: 97 F | HEIGHT: 66 IN

## 2021-04-12 DIAGNOSIS — S91.302A OPEN WOUND OF LEFT FOOT, INITIAL ENCOUNTER: Primary | ICD-10-CM

## 2021-04-12 DIAGNOSIS — S91.302A OPEN WOUND OF LEFT FOOT, INITIAL ENCOUNTER: ICD-10-CM

## 2021-04-12 DIAGNOSIS — L84 CALLUS OF FOOT: Primary | ICD-10-CM

## 2021-04-12 DIAGNOSIS — L84 CALLUS OF FOOT: ICD-10-CM

## 2021-04-12 PROCEDURE — 99214 OFFICE O/P EST MOD 30 MIN: CPT | Performed by: PHYSICIAN ASSISTANT

## 2021-04-12 ASSESSMENT — MIFFLIN-ST. JEOR: SCORE: 1364.8

## 2021-04-12 ASSESSMENT — PAIN SCALES - GENERAL: PAINLEVEL: MODERATE PAIN (5)

## 2021-04-12 NOTE — PROGRESS NOTES
Patient walked into the clinic on Monday 04/12/21. The patient had a callus on the bottom of his left foot around 7:30AM. He went to cut it off. Then he went to the nursing home to feed his wife and returned home around 9:30AM. His foot was bleeding and he was unable to stop it. On assessment there was a cut located posterior on the left foot under the hallux or big toe. The area was currently bleeding. This nurse washed the area with sterile water and soap. Then applied pressure to get the top of the cut to stop bleeding. A gauze was held on the area for about 15 to 20 minutes with no change to the patient's bleeding status. Provider Faheem Natarajan was called into the room for a further evaluation.     This nurse applied a pressure dressing. First, bacitracin was applied to the cut. Then a small primapore was applied to the area. Finally, kerlix was wrapped around the foot to apply pressure. Patient was sent home with discharge instructions. Please review discharge summary for home care instructions. Patient was instructed to call the clinic or come back if his bleeding starts again and he is unable to stop it.     This nurse scheduled the following appointments.   Next 5 appointments (look out 90 days)    Apr 14, 2021  8:00 AM  Office Visit with Sarah Hickey PA-C  [facility] ([facility] ) [address] [place] [id number]  [phone]        2.  Wednesday May 5th, 2021 at 10AM.     Angelica Marinelli RN, BSN  [place]  April 12, 2021    Patient does not have a history of diabetes or is he taking any blood thinners. Patient was recently seen for back pain.     Faheem applied pressure and Drysol to stop the bleeding. A piece of skin was trimmed off due to not being fully intacted to help stop the bleeding. The nurse placed a pressure dressing on the patient's left foot.     PLAN:    A referral was placed  for podiatry on 05/05/21. The patient was scheduled for a follow up later this week on Wednesday 04/14/21 with Ruddy Hickey. Home instructions were provided.     Angelica Marinelli RN, BSN  [place]  April 12, 2021

## 2021-04-12 NOTE — PATIENT INSTRUCTIONS
Change dressing once a day.   Wash with warm water and soap, dry, and place pressure bandage on.     Put Bacitracin on the area, then place bandage on, then wrap with kerlix (wrap) to keep in place.     Please call us or come back to the clinic if the area starts bleeding again.       [facility]  [phone]

## 2021-04-12 NOTE — PROGRESS NOTES
Assessment & Plan       ICD-10-CM    1. Open wound of left foot, initial encounter  S91.302A    2. Callus of foot  L84        Patient states he has frequent foot calluses and tried to cut one today at home but cut too deep.  I was able to cause hemostasis with firm pressure and Drysol and then the nurse wrapped the foot in a pressure bandage.  Encouraged to keep area clean and dry with warm, soapy water daily followed by Vaseline or antibiotic ointment.  He will monitor for signs of infection.  Can replace bandage daily for the next few days then follow up with PCP in a few days for a recheck.  I also recommend he see podiatry to treat other calluses.       SANYA [facility] MICHELLE Sloan is a 86 year old who presents for the following health issues:  HPI     Patient walked into the clinic on Monday 04/12/21. The patient had a callus on the bottom of his left foot around 7:30AM. He went to cut it off. Then he went to the nursing home to feed his wife and returned home around 9:30AM. His foot was bleeding and he was unable to stop it. On assessment there was a cut located on the left foot under the hallux or big toe. The area was currently bleeding. This nurse washed the area with sterile water and soap. Then applied pressure to get the top of the cut to stop bleeding. A gauze was held on the area for about 15 to 20 minutes with no change to the patient's bleeding status. Provider Faheem Natarajan was called into the room for a further evaluation.     This nurse applied a pressure dressing. First, bacitracin was applied to the cut. Then a small primapore was applied to the area. Finally, kerlix was wrapped around the foot to apply pressure. Patient was sent home with discharge instructions. Please review discharge summary for home care instructions. Patient was instructed to call the clinic or come back if his bleeding starts again and he is unable to stop it.     This  "nurse scheduled the following appointments.   Next 5 appointments (look out 90 days)    Apr 14, 2021  8:00 AM  Office Visit with Sarah Hickey PA-C  [facility] ([facility] ) [address]  [facility] [address]  [phone]        2.  Wednesday May 5th, 2021 at 10AM.     Angelica Marinelli RN, BSN  [place]  April 12, 2021    Patient does not have a history of diabetes or is he taking any blood thinners. Patient was recently seen for back pain.     Faheem applied pressure and Drysol to stop the bleeding. A piece of skin was trimmed off due to not being fully intacted to help stop the bleeding. The nurse placed a pressure dressing on the patient's left foot.     PLAN:    A referral was placed for podiatry on 05/05/21. The patient was scheduled for a follow up later this week on Wednesday 04/14/21 with Ruddy Hickey. Home instructions were provided.     Review of Systems   Constitutional, cardiovascular, pulmonary, and skin systems are negative, except as otherwise noted.      Objective    /86 (BP Location: Right arm, Patient Position: Sitting)   Pulse 102   Temp 97  F (36.1  C) (Temporal)   Ht 1.68 m (5' 6.14\")   Wt 73.5 kg (162 lb)   SpO2 97%   BMI 26.04 kg/m    Body mass index is 26.04 kg/m .  Physical Exam   GENERAL: healthy, alert and no distress  SKIN: Left plantar foot wound with active bledding just below the hallux. No foreign body noted.           "

## 2021-04-12 NOTE — PATIENT INSTRUCTIONS
Change dressing once a day.   Wash with warm water and soap, dry, and place pressure bandage on.     Put Bacitracin on the area, then place bandage on, then wrap with kerlix (wrap) to keep in place.     Please call us or come back to the clinic if the area starts bleeding again.       [facility]  [phone]

## 2021-04-12 NOTE — PROGRESS NOTES
"Assessment & Plan     ICD-10-CM    1. Open wound of left foot, subsequent encounter  S91.302D    2. Callus of foot  L84      - Patient was seen 2 days ago after accidentally cutting a callus too deep   - Has been doing home cares, no signs of infection   - Area looks to be healing today, continue with home cares      Reviewed monitoring for infection   - Does have copious calluses, recommend as previous provider that he get seen by podiatry and likely home foot cares as he is not able to reach a lot himself       Review of the result(s) of each unique test - None    Diagnosis or treatment significantly limited by social determinants of health - None     20 minutes spent on the date of the encounter doing chart review, history and exam, documentation and further activities as noted above    The patient indicates understanding of these issues and agrees with the plan.    Return in about 3 months (around 7/14/2021). for med check     SANYA [facility] MICHELLE Sloan is a 86 year old who presents for the following health issues     HPI     Concern - left foot wound   Onset: 4/12/2021  Description: open circular wound    Intensity: mild  Progression of Symptoms:  same  Accompanying Signs & Symptoms: none  Previous history of similar problem: none  Precipitating factors:        Worsened by: none  Alleviating factors:        Improved by: dressing changes   Therapies tried and outcome: None    - Feels better, no more bleeding   - Changing his bandage as instructed   - No signs of infection, doesn't hurt         Review of Systems   Constitutional, HEENT, cardiovascular, pulmonary, gi and gu systems are negative, except as otherwise noted.      Objective    /84   Pulse 84   Temp 97.3  F (36.3  C) (Temporal)   Ht 1.68 m (5' 6.14\")   Wt 74.3 kg (163 lb 12.8 oz)   BMI 26.32 kg/m    Body mass index is 26.32 kg/m .  Physical Exam   GENERAL APPEARANCE: " healthy, alert and no distress  EYES: Eyes grossly normal to inspection, PERRLA, conjunctivae and sclerae without injection or discharge, EOM intact   MS: No musculoskeletal defects are noted and gait is age appropriate without ataxia   SKIN:   Left foot   Media Information         No other suspicious lesions or rashes, hydration status appears adeuqate with normal skin turgor   PSYCH: Alert and oriented x3; speech- coherent , normal rate and volume; able to articulate logical thoughts, able to abstract reason, no tangential thoughts, no hallucinations or delusions, mentation appears normal, Mood is euthymic. Affect is appropriate for this mood state and bright. Thought content is free of suicidal ideation, hallucinations, and delusions. Dress is adequate and upkept. Eye contact is good during conversation.       Diagnostics   None

## 2021-04-14 ENCOUNTER — OFFICE VISIT (OUTPATIENT)
Dept: FAMILY MEDICINE | Facility: OTHER | Age: 86
End: 2021-04-14
Payer: MEDICARE

## 2021-04-14 VITALS
SYSTOLIC BLOOD PRESSURE: 130 MMHG | HEIGHT: 66 IN | DIASTOLIC BLOOD PRESSURE: 84 MMHG | HEART RATE: 84 BPM | BODY MASS INDEX: 26.33 KG/M2 | TEMPERATURE: 97.3 F | WEIGHT: 163.8 LBS

## 2021-04-14 DIAGNOSIS — L84 CALLUS OF FOOT: ICD-10-CM

## 2021-04-14 DIAGNOSIS — S91.302D OPEN WOUND OF LEFT FOOT, SUBSEQUENT ENCOUNTER: Primary | ICD-10-CM

## 2021-04-14 PROCEDURE — 99213 OFFICE O/P EST LOW 20 MIN: CPT | Performed by: PHYSICIAN ASSISTANT

## 2021-04-14 ASSESSMENT — MIFFLIN-ST. JEOR: SCORE: 1372.99

## 2021-04-14 ASSESSMENT — PAIN SCALES - GENERAL: PAINLEVEL: MODERATE PAIN (4)

## 2021-04-14 NOTE — PATIENT INSTRUCTIONS
BP Readings from Last 6 Encounters:   04/14/21 130/84   04/12/21 119/86   04/07/21 (!) 142/88   12/07/20 (!) 150/84   10/13/20 138/80   09/09/20 (!) 140/90

## 2021-05-05 ENCOUNTER — OFFICE VISIT (OUTPATIENT)
Dept: PODIATRY | Facility: OTHER | Age: 86
End: 2021-05-05
Attending: PHYSICIAN ASSISTANT
Payer: MEDICARE

## 2021-05-05 VITALS
TEMPERATURE: 97.3 F | DIASTOLIC BLOOD PRESSURE: 82 MMHG | BODY MASS INDEX: 26.33 KG/M2 | SYSTOLIC BLOOD PRESSURE: 158 MMHG | WEIGHT: 163.8 LBS | HEIGHT: 66 IN

## 2021-05-05 DIAGNOSIS — M20.42 HAMMERTOES OF BOTH FEET: ICD-10-CM

## 2021-05-05 DIAGNOSIS — M20.41 HAMMERTOES OF BOTH FEET: ICD-10-CM

## 2021-05-05 DIAGNOSIS — L85.9 HYPERKERATOSIS: Primary | ICD-10-CM

## 2021-05-05 DIAGNOSIS — L84 HELOMA MOLLE: ICD-10-CM

## 2021-05-05 DIAGNOSIS — I73.9 PERIPHERAL VASCULAR DISEASE (H): ICD-10-CM

## 2021-05-05 PROCEDURE — 99203 OFFICE O/P NEW LOW 30 MIN: CPT | Performed by: PODIATRIST

## 2021-05-05 ASSESSMENT — PAIN SCALES - GENERAL: PAINLEVEL: MODERATE PAIN (4)

## 2021-05-05 ASSESSMENT — MIFFLIN-ST. JEOR: SCORE: 1372.96

## 2021-05-05 NOTE — LETTER
5/5/2021         RE: Luther Kam  [address]  [facility] [address]        Dear Colleague,    Thank you for referring your patient, Luther Kam, to the [facility]. Please see a copy of my visit note below.    HPI:  Luther Kam is a 86 year old male who is seen in consultation at the request of Osmar Natarajan PA-C    Pt presents for eval of:   (Onset, Location, L/R, Character, Treatments, Injury if yes)     Onset 4/12/2021, cut off callus plantar left foot base of Left great toe, causing an open wound.    Retired.      Luther to follow up with Primary Care provider regarding elevated blood pressure.    Review of Systems:  Patient denies fever, chills, rash, wound, stiffness, limping, numbness, weakness, heart burn, blood in stool, chest pain with activity, calf pain when walking, shortness of breath with activity, chronic cough, easy bleeding/bruising, swelling of ankles, excessive thirst, fatigue, depression, anxiety.  Patient admits only to symptoms noted in history.     Patient Active Problem List   Diagnosis     Irritable bowel syndrome     Rosacea     Alcohol abuse, episodic drinking behavior     Elevated blood pressure reading without diagnosis of hypertension     Low hemoglobin - all his life     Localized edema     Compression fracture of L1 lumbar vertebra with routine healing     Chronic bilateral low back pain without sciatica     Psychophysiological insomnia     Urinary frequency     ABHIJEET (generalized anxiety disorder)     Callus of foot     Open wound of left foot, subsequent encounter     PAST MEDICAL HISTORY:   Past Medical History:   Diagnosis Date     Irritable bowel syndrome      Rosacea      PAST SURGICAL HISTORY:   Past Surgical History:   Procedure Laterality Date     HC HEMORRHOIDECT EXTERNAL, COMPLETE  1962      REPAIR ING HERNIA,5+Y/O,REDUCIBL  2007    right     MEDICATIONS:   Current Outpatient Medications:      HYDROcodone-acetaminophen (NORCO)  5-325 MG tablet, Take 1-2 tablets by mouth every 8 hours as needed for moderate to severe pain (Max 4 per day), Disp: 90 tablet, Rfl: 0  ALLERGIES:  No Known Allergies  SOCIAL HISTORY:   Social History     Socioeconomic History     Marital status:      Spouse name: Not on file     Number of children: Not on file     Years of education: Not on file     Highest education level: Not on file   Occupational History     Not on file   Social Needs     Financial resource strain: Not on file     Food insecurity     Worry: Not on file     Inability: Not on file     Transportation needs     Medical: Not on file     Non-medical: Not on file   Tobacco Use     Smoking status: Former Smoker     Packs/day: 0.50     Types: Cigarettes     Smokeless tobacco: Never Used     Tobacco comment: socially, not daily   Substance and Sexual Activity     Alcohol use: Not Currently     Comment: none      Drug use: No     Sexual activity: Not Currently     Partners: Female     Comment: no bc   Lifestyle     Physical activity     Days per week: Not on file     Minutes per session: Not on file     Stress: Not on file   Relationships     Social connections     Talks on phone: Not on file     Gets together: Not on file     Attends Rastafarian service: Not on file     Active member of club or organization: Not on file     Attends meetings of clubs or organizations: Not on file     Relationship status: Not on file     Intimate partner violence     Fear of current or ex partner: Not on file     Emotionally abused: Not on file     Physically abused: Not on file     Forced sexual activity: Not on file   Other Topics Concern     Parent/sibling w/ CABG, MI or angioplasty before 65F 55M? No   Social History Narrative     Not on file     FAMILY HISTORY:   Family History   Problem Relation Age of Onset     Hypertension Mother      EXAM:Vitals: BP (!) 158/82 (BP Location: Right arm, Patient Position: Sitting, Cuff Size: Adult Regular)   Temp 97.3  F (36.3  " C) (Temporal)   Ht 1.68 m (5' 6.14\")   Wt 74.3 kg (163 lb 12.8 oz)   BMI 26.33 kg/m    BMI= Body mass index is 26.33 kg/m .    General appearance: Patient is alert and fully cooperative with history & exam.  No sign of distress is noted during the visit.     Psychiatric: Affect is pleasant & appropriate.  Patient appears motivated to improve health.     Respiratory: Breathing is regular & unlabored while sitting.     HEENT: Hearing is intact to spoken word.  Speech is clear.  No gross evidence of visual impairment that would impact ambulation.     Vascular: DP 1/4 & PT 0/4 left & right.  CFT delayed with dependent rubor noted about the digits.  Diminished hair growth distal to mid tibia and no hair about the foot and toes.  Temperature changes noted, warm to cool proximal to distal.  Hemosiderin pigmentation noted with multiple varicosities legs and feet bilateral. Generalized edema bilateral legs and feet.  Pt denies claudication history.     Neurologic: Normal plantar response bilateral.  Diminished protective threshold tested with a 5.07 monofilament.  Pt admits  paraesthesias about the feet and toes with palpation.     Dermatologic:  Diminished texture turgor and tone about the integument.  Skin is thin & shiny.  Preulcerative hyperkeratosis is noted about the plantar left first metatarsal head with some dry hematogenous exudate noted throughout with about 2 cm of hyperkeratosis.  Upon debridement no full-thickness ulceration is noted here.  A small fissure is noted about the left fourth interdigital space but this is quite superficial.  No hyperkeratosis.  Very mild subtle hyperkeratosis noted about the right fourth interdigital space heloma molle.     Musculoskeletal: Patient is ambulatory without assistive device or brace.  There is semi reducible contracture of the lesser digits.    Creatinine (mg/dL)   Date Value   08/17/2017 0.82   08/08/2017 1.46 (H)   11/29/2005 1.09          ASSESSMENT:       " ICD-10-CM    1. Hyperkeratosis  L85.9    2. Heloma molle  L84    3. Hammertoes of both feet  M20.41     M20.42    4. Peripheral vascular disease (H)  I73.9         PLAN:    5/5/2021    I sharply debrided to pre ulcerative hyperkeratotic lesions to the level of the dermis as noted above with a 15 blade.  No ulcerations were noted.  Recommended at home abrasive debridement as part of his normal hygiene at bath time and written instructions were dispensed.    Minimal debridement today to provide comfort and demonstrate proper hygiene and confirm this is resolved.        Anil Gerard DPM              Again, thank you for allowing me to participate in the care of your patient.        Sincerely,        Anil Gerard DPM

## 2021-05-05 NOTE — PROGRESS NOTES
HPI:  Luther Kam is a 86 year old male who is seen in consultation at the request of Osmar Natarajan PA-C    Pt presents for eval of:   (Onset, Location, L/R, Character, Treatments, Injury if yes)     Onset 4/12/2021, cut off callus plantar left foot base of Left great toe, causing an open wound.    Retired.      Luther to follow up with Primary Care provider regarding elevated blood pressure.    Review of Systems:  Patient denies fever, chills, rash, wound, stiffness, limping, numbness, weakness, heart burn, blood in stool, chest pain with activity, calf pain when walking, shortness of breath with activity, chronic cough, easy bleeding/bruising, swelling of ankles, excessive thirst, fatigue, depression, anxiety.  Patient admits only to symptoms noted in history.     Patient Active Problem List   Diagnosis     Irritable bowel syndrome     Rosacea     Alcohol abuse, episodic drinking behavior     Elevated blood pressure reading without diagnosis of hypertension     Low hemoglobin - all his life     Localized edema     Compression fracture of L1 lumbar vertebra with routine healing     Chronic bilateral low back pain without sciatica     Psychophysiological insomnia     Urinary frequency     ABHIJEET (generalized anxiety disorder)     Callus of foot     Open wound of left foot, subsequent encounter     PAST MEDICAL HISTORY:   Past Medical History:   Diagnosis Date     Irritable bowel syndrome      Rosacea      PAST SURGICAL HISTORY:   Past Surgical History:   Procedure Laterality Date     HC HEMORRHOIDECT EXTERNAL, COMPLETE  1962     HC REPAIR ING HERNIA,5+Y/O,REDUCIBL  2007    right     MEDICATIONS:   Current Outpatient Medications:      HYDROcodone-acetaminophen (NORCO) 5-325 MG tablet, Take 1-2 tablets by mouth every 8 hours as needed for moderate to severe pain (Max 4 per day), Disp: 90 tablet, Rfl: 0  ALLERGIES:  No Known Allergies  SOCIAL HISTORY:   Social History     Socioeconomic History     Marital status:  "     Spouse name: Not on file     Number of children: Not on file     Years of education: Not on file     Highest education level: Not on file   Occupational History     Not on file   Social Needs     Financial resource strain: Not on file     Food insecurity     Worry: Not on file     Inability: Not on file     Transportation needs     Medical: Not on file     Non-medical: Not on file   Tobacco Use     Smoking status: Former Smoker     Packs/day: 0.50     Types: Cigarettes     Smokeless tobacco: Never Used     Tobacco comment: socially, not daily   Substance and Sexual Activity     Alcohol use: Not Currently     Comment: none      Drug use: No     Sexual activity: Not Currently     Partners: Female     Comment: no bc   Lifestyle     Physical activity     Days per week: Not on file     Minutes per session: Not on file     Stress: Not on file   Relationships     Social connections     Talks on phone: Not on file     Gets together: Not on file     Attends Mu-ism service: Not on file     Active member of club or organization: Not on file     Attends meetings of clubs or organizations: Not on file     Relationship status: Not on file     Intimate partner violence     Fear of current or ex partner: Not on file     Emotionally abused: Not on file     Physically abused: Not on file     Forced sexual activity: Not on file   Other Topics Concern     Parent/sibling w/ CABG, MI or angioplasty before 65F 55M? No   Social History Narrative     Not on file     FAMILY HISTORY:   Family History   Problem Relation Age of Onset     Hypertension Mother      EXAM:Vitals: BP (!) 158/82 (BP Location: Right arm, Patient Position: Sitting, Cuff Size: Adult Regular)   Temp 97.3  F (36.3  C) (Temporal)   Ht 1.68 m (5' 6.14\")   Wt 74.3 kg (163 lb 12.8 oz)   BMI 26.33 kg/m    BMI= Body mass index is 26.33 kg/m .    General appearance: Patient is alert and fully cooperative with history & exam.  No sign of distress is noted during " the visit.     Psychiatric: Affect is pleasant & appropriate.  Patient appears motivated to improve health.     Respiratory: Breathing is regular & unlabored while sitting.     HEENT: Hearing is intact to spoken word.  Speech is clear.  No gross evidence of visual impairment that would impact ambulation.     Vascular: DP 1/4 & PT 0/4 left & right.  CFT delayed with dependent rubor noted about the digits.  Diminished hair growth distal to mid tibia and no hair about the foot and toes.  Temperature changes noted, warm to cool proximal to distal.  Hemosiderin pigmentation noted with multiple varicosities legs and feet bilateral. Generalized edema bilateral legs and feet.  Pt denies claudication history.     Neurologic: Normal plantar response bilateral.  Diminished protective threshold tested with a 5.07 monofilament.  Pt admits  paraesthesias about the feet and toes with palpation.     Dermatologic:  Diminished texture turgor and tone about the integument.  Skin is thin & shiny.  Preulcerative hyperkeratosis is noted about the plantar left first metatarsal head with some dry hematogenous exudate noted throughout with about 2 cm of hyperkeratosis.  Upon debridement no full-thickness ulceration is noted here.  A small fissure is noted about the left fourth interdigital space but this is quite superficial.  No hyperkeratosis.  Very mild subtle hyperkeratosis noted about the right fourth interdigital space heloma molle.     Musculoskeletal: Patient is ambulatory without assistive device or brace.  There is semi reducible contracture of the lesser digits.    Creatinine (mg/dL)   Date Value   08/17/2017 0.82   08/08/2017 1.46 (H)   11/29/2005 1.09          ASSESSMENT:       ICD-10-CM    1. Hyperkeratosis  L85.9    2. Heloma molle  L84    3. Hammertoes of both feet  M20.41     M20.42    4. Peripheral vascular disease (H)  I73.9         PLAN:    5/5/2021    I sharply debrided to pre ulcerative hyperkeratotic lesions to the  level of the dermis as noted above with a 15 blade.  No ulcerations were noted.  Recommended at home abrasive debridement as part of his normal hygiene at bath time and written instructions were dispensed.    Minimal debridement today to provide comfort and demonstrate proper hygiene and confirm this is resolved.        Anil Gerard DPM

## 2021-05-05 NOTE — PATIENT INSTRUCTIONS
"Nail Debridement    A high quality instrument makes trimming toenails MUCH easier.  Search ebay for any 5\" nail nipper manufactured by reliable brands such as Miltex, Integra or Jarit as these quality instruments will help manage difficult nails more effectively and comfortably. We use Miltex -SS.  A physician is not necessary to trim nails even if you are taking blood thinners or are diabetic.  Your family or care givers may help manage your toenails.      Trim or sand the nails once weekly.  Do not wait until they are long and painful or trimming will become too difficult and painful and will increase your risk of complications or infection.  A course file or 120 grit sandpaper on a sanding block can be helpful.  For very thick nails many people prefer battery operated lu such as an Amope', Personal Pedi and Emjoi for regular use or heavy painful callouses or thick toenails.    Trim or skive any portion of nail that is thick, loose, crumbling, or not well attached. Do not tear the nail away, but rather cut them with a nail nipperor sand or sand them down.  You may follow up with your Podiatric Physician if you have pain, bleeding, infection, questions or other concerns.      You may also contact the following Registered Nurses for further help with nail debridement and minor hygiene concnerns.  They may come to your home or meet them at their clinic to trim your toenails and soak your feet, as well as monitor for any complications that would require evaluation by a Physician.      [facility]  Codie Brewer RN  Phone & text [phone]    [facility]  Jennifer Adams RN  Office [phone]    [facility]  Palma Handy RN  [phone]   Call or text for appointment  Some home visits and has a clinic at:  [address] [place] [id number]    [facility]  Dayna Hayden RN  [phone]    [facility]  [phone]  [place], [place], [place]    [facility]  [phone]  [email or web address]    For up to date list and to find foot care nurses in other communities visit American [employer]es Association website:  afcna.org.     Calluses, Corns, IPKs, Porokeratosis    When there is excessive friction or pressure on the skin, the body responds by making the skin thicker.  While this may protect the deeper structures, the thickened skin can take up more space and thus increase pressure over a bony prominence or become an open sore or skin ulcer as this skin becomes less flexible.    Flat, diffuse thickening are simple calluses and they are usually caused by friction.  Often these are the result of rubbing on a shoe or going barefoot.    Calluses with a central core between the toes are called corns.  These often result from prominent joints on adjacent toes rubbing together.  Theses are often a symptom of bone malalignment and will usually recur unless the underlying bones are addressed.    Many of these lesions can be kept comfortable with routine maintenance. This consists of filing them with a Ped Egg, callus file, or 120 grit sandpaper on a block, every day during your bath or shower.  Most people prefer battery operated lu such as an Amope', Personal Pedi and Emjoi for regular use or heavy painful callouses.  Heavy creams or ointments can be applied 1-2 times every day to keep them soft. Toe spacers can be used for corns, gel pads can be used for other lesions on the bottom of the foot. If there is a deformity noted, such as a prominent bone, often this can be addressed to minimize recurrence. However, sometimes the pressure and lesion simply migrates to another spot after surgery, so it is not a guaranteed cure.     If you have severe callouses and cracking, you may apply heavy ointments that you scoop up such as Cetaphil cream, Eucerin, Aquaphor or  Vaseline.  Be sure to obtain cream or ointment in these brands and not lotion (lotion is water based and not durable enough for feet). For more aggressive help apply heavy creams or ointment under occlusive dressings such as Saran Wrap or Jelly Feet while sleeping.   Jelly Feet can be obtained at www.jellyfeet.com.     To be successful with managing hyperkeratotic skin, you must manage hygiene daily.  Apply the cream once or twice EVERY day.  At your bath or shower time is the easiest time to work on this when skin is most soft.  There is no medical or surgical treatment that will absolutely eliminate many of these symptoms.      Pedifix is a reliable source for all sorts of foot pads, cushions, or interdigital spacers and foot appliances. Go to www.KoolSpan.Bueda or request a catalog at 0-838-OOgave.        Please call with any additional questions.

## 2021-05-18 NOTE — PROGRESS NOTES
Assessment & Plan     Penile lesion  Non-specific rash of the penis.  There are three very small papules with very mild erythema without any signs of vesicular formation.  They are not consistent with any form of nevus or nevi of the skin.  Discussed it could be related to occasional yeast infection or just irritation.  His skin does cover this portion of the shaft of the penis and head of the penis that it may be creating an ideal environment for infection such as yeast/fungal to develop.  At this time he has no symptoms. Low suspicion for herpes.  Do not think this is consistent with skin cancer especially since it completely heals.  Recommended topical anti-fungals if needed.  Keep clean and dry.  Follow-up if not improving, worse, or new symptoms develop.       Return in about 2 weeks (around 6/2/2021) for If not improving, sooner if worse or new concerns.    Options for treatment and follow-up care were reviewed with the patient and/or guardian. Patient and/or guardian engaged in the decision making process and verbalized understanding of the options discussed and agreed with the final plan.    Gadiel Ma PA-C  [facility] ROSARIO Sloan is a 86 year old who presents for the following health issues     HPI     Rash  Onset/Duration: 3 weeks  Description  Location: Penis  Character: red  Itching: no  Intensity:  mild  Progression of Symptoms:  same  Accompanying signs and symptoms:   Fever: no  Body aches or joint pain: YES  Sore throat symptoms: no  Recent cold symptoms: no  History:           Previous episodes of similar rash: yes  New exposures:  None  Recent travel: no  Exposure to similar rash: no  Precipitating or alleviating factors: none  Therapies tried and outcome: none    - Notes that for many years he has had this issue on and off at the head of his penis.  There will be some redness that will come on and last for a while and then go away.   - it happens maybe  twice per year.  He thought maybe it was herpes related since he has had it for so long.   - He also wonders about skin cancer but notes that this does entirely resolve after it starts but it comes back.   - It is not painful or itchy.   - Denies fevers, chills, dysuria, hematuria, change in urinary frequency.   - No discharge from penis, no testicular pain, no scrotal swelling.     Review of Systems   Constitutional, , ski, msk systems are negative, except as otherwise noted.      Objective    /80   Temp 99  F (37.2  C) (Temporal)   Resp 16   Wt 73.6 kg (162 lb 3.2 oz)   BMI 26.07 kg/m    Body mass index is 26.07 kg/m .  Physical Exam   GENERAL: healthy, alert and no distress   (male): testicles normal without atrophy or masses, no hernias and at the base of the Glans there are three small pinpoint papular lesions with mild erythema without edema, no signs of blistering, erosions, ulcerations.  No associated edema, no discharge from the penis.   PSYCH: mentation appears normal, affect normal/bright

## 2021-05-19 ENCOUNTER — OFFICE VISIT (OUTPATIENT)
Dept: FAMILY MEDICINE | Facility: OTHER | Age: 86
End: 2021-05-19
Payer: MEDICARE

## 2021-05-19 VITALS
WEIGHT: 162.2 LBS | TEMPERATURE: 99 F | DIASTOLIC BLOOD PRESSURE: 80 MMHG | BODY MASS INDEX: 26.07 KG/M2 | RESPIRATION RATE: 16 BRPM | SYSTOLIC BLOOD PRESSURE: 120 MMHG

## 2021-05-19 DIAGNOSIS — N48.9 PENILE LESION: Primary | ICD-10-CM

## 2021-05-19 PROCEDURE — 99213 OFFICE O/P EST LOW 20 MIN: CPT | Performed by: PHYSICIAN ASSISTANT

## 2021-06-09 DIAGNOSIS — G89.29 CHRONIC BILATERAL LOW BACK PAIN WITHOUT SCIATICA: ICD-10-CM

## 2021-06-09 DIAGNOSIS — M54.50 CHRONIC BILATERAL LOW BACK PAIN WITHOUT SCIATICA: ICD-10-CM

## 2021-06-09 RX ORDER — HYDROCODONE BITARTRATE AND ACETAMINOPHEN 5; 325 MG/1; MG/1
1-2 TABLET ORAL EVERY 8 HOURS PRN
Qty: 90 TABLET | Refills: 0 | Status: SHIPPED | OUTPATIENT
Start: 2021-06-09 | End: 2021-08-10

## 2021-06-09 NOTE — TELEPHONE ENCOUNTER
Reason for Call:  Medication or medication refill:    Do you use a [facility]?  Name of the pharmacy and phone number for the current request:  [facility] - [phone]    Name of the medication requested: HYDROcodone-acetaminophen (NORCO) 5-325 MG tablet    Other request: none    Can we leave a detailed message on this number? YES    Phone number patient can be reached at: Home number on file [phone] (home)    Best Time: anytime    Call taken on 6/9/2021 at 7:55 AM by Sarahi Salomon

## 2021-06-09 NOTE — TELEPHONE ENCOUNTER
reviewed. Due for routine fill. Rx e-prescribed. Please notify patient this has been done    Ruddy Au-SANYA Lynch  [facility]

## 2021-08-10 DIAGNOSIS — M54.50 CHRONIC BILATERAL LOW BACK PAIN WITHOUT SCIATICA: ICD-10-CM

## 2021-08-10 DIAGNOSIS — G89.29 CHRONIC BILATERAL LOW BACK PAIN WITHOUT SCIATICA: ICD-10-CM

## 2021-08-10 RX ORDER — HYDROCODONE BITARTRATE AND ACETAMINOPHEN 5; 325 MG/1; MG/1
1-2 TABLET ORAL EVERY 8 HOURS PRN
Qty: 90 TABLET | Refills: 0 | Status: SHIPPED | OUTPATIENT
Start: 2021-08-10 | End: 2021-10-08

## 2021-10-08 DIAGNOSIS — M54.50 CHRONIC BILATERAL LOW BACK PAIN WITHOUT SCIATICA: ICD-10-CM

## 2021-10-08 DIAGNOSIS — G89.29 CHRONIC BILATERAL LOW BACK PAIN WITHOUT SCIATICA: ICD-10-CM

## 2021-10-08 RX ORDER — HYDROCODONE BITARTRATE AND ACETAMINOPHEN 5; 325 MG/1; MG/1
1-2 TABLET ORAL EVERY 8 HOURS PRN
Qty: 90 TABLET | Refills: 0 | Status: SHIPPED | OUTPATIENT
Start: 2021-10-08 | End: 2021-12-06

## 2021-10-08 NOTE — TELEPHONE ENCOUNTER
Reason for Call:  Medication or medication refill:    Do you use a [facility]?  Name of the pharmacy and phone number for the current request:  [facility] - [phone]    Name of the medication requested:   HYDROcodone-acetaminophen (NORCO) 5-325 MG tablet 90 tablet         Other request: Pt came into clinic to request this med to be refilled at [facility] in [place]. He would like it done today, if possible.    Can we leave a detailed message on this number? YES    Phone number patient can be reached at: Home number on file [phone] (home)    Best Time: Any    Call taken on 10/8/2021 at 7:49 AM by Sarahi Salomon

## 2021-10-12 ENCOUNTER — TELEPHONE (OUTPATIENT)
Dept: FAMILY MEDICINE | Facility: OTHER | Age: 86
End: 2021-10-12

## 2021-10-12 DIAGNOSIS — T78.1XXA GASTROINTESTINAL FOOD SENSITIVITY: Primary | ICD-10-CM

## 2021-10-12 NOTE — TELEPHONE ENCOUNTER
Reason for Call: Request for an order or referral:    Order or referral being requested: allergy referral    Date needed: as soon as possible    Has the patient been seen by the PCP for this problem? NO    Additional comments: Luther would like to have a referral to allergy for possible food allergies. He does not use the phone so he will stop by later today to see if a referral has been placed.    Phone number Patient can be reached at:      Best Time:      Can we leave a detailed message on this number?  NO    Call taken on 10/12/2021 at 9:36 AM by Darlene Buckley

## 2021-10-12 NOTE — TELEPHONE ENCOUNTER
Referral placed. Please assist in scheduling.    Ruddy Hickey PA-C  [facility]

## 2021-11-08 NOTE — PROGRESS NOTES
SUBJECTIVE:                                                                   Luther Kam is an 86-year-old male who presents today to our Allergy Clinic at [facility]; He is being seen in consultation at the request of Sarah Au PA-C, for food allergy evaluation.     In the past, he always had some GI issues and was diagnosed with IBS.  He may have diarrhea on and off, and it is not unusual for him.    Approximately 1.5 years ago, he noticed that he started developing frequent rhinorrhea and sometimes sneezing.   It starts first thing in the morning but exacerbates when he eats or wears a mask.  Sometimes, when he eats, he has an intense urge to urinate.  When he does that, he is okay after that from urinary standpoint.  He tried and failed oral antihistamines.  Has not tried nasal sprays.  It may happen with any foods he eats, and sometimes without eating.  Perennial pattern without seasonal exacerbations.  No urticaria or angioedema with ingestion of any foods.        Patient Active Problem List   Diagnosis     Irritable bowel syndrome     Rosacea     Alcohol abuse, episodic drinking behavior     Elevated blood pressure reading without diagnosis of hypertension     Low hemoglobin - all his life     Localized edema     Compression fracture of L1 lumbar vertebra with routine healing     Chronic bilateral low back pain without sciatica     Psychophysiological insomnia     Urinary frequency     ABHIJEET (generalized anxiety disorder)     Callus of foot     Open wound of left foot, subsequent encounter       Past Medical History:   Diagnosis Date     Irritable bowel syndrome      Rosacea       Problem (# of Occurrences) Relation (Name,Age of Onset)    Asthma (1) Brother    Hypertension (1) Mother        Past Surgical History:   Procedure Laterality Date     HC HEMORRHOIDECT EXTERNAL, COMPLETE  1962     HC REPAIR ING HERNIA,5+Y/O,REDUCIBL  2007    right     Social History      Socioeconomic History     Marital status:      Spouse name: None     Number of children: None     Years of education: None     Highest education level: None   Occupational History     Comment: Retired   Tobacco Use     Smoking status: Former Smoker     Packs/day: 0.50     Types: Cigarettes     Smokeless tobacco: Never Used     Tobacco comment: socially, not daily   Vaping Use     Vaping Use: Never used   Substance and Sexual Activity     Alcohol use: Not Currently     Comment: none      Drug use: No     Sexual activity: Not Currently     Partners: Female     Comment: no bc   Other Topics Concern     Parent/sibling w/ CABG, MI or angioplasty before 65F 55M? No   Social History Narrative    ENVIRONMENTAL HISTORY: [place] The home is heated with a forced air. They does not have central air conditioning. The patient's bedroom is furnished with carpeting in bedroom.  Pets inside the house include 0 pets. There is no history of cockroach or mice infestation. There is/are 0 smokers in the house.  The house does not have a damp basement.      Social Determinants of Health     Financial Resource Strain: Not on file   Food Insecurity: Not on file   Transportation Needs: Not on file   Physical Activity: Not on file   Stress: Not on file   Social Connections: Not on file   Intimate Partner Violence: Not on file   Housing Stability: Not on file           Review of Systems   Constitutional: Negative for activity change, fatigue and fever.   HENT: Positive for rhinorrhea and sneezing. Negative for congestion, ear pain, facial swelling, nosebleeds, postnasal drip and sinus pressure.    Eyes: Negative for discharge, redness and itching.   Respiratory: Negative for cough, chest tightness, shortness of breath, wheezing and stridor.    Gastrointestinal: Positive for diarrhea. Negative for nausea and vomiting.   Musculoskeletal: Negative for myalgias.   Skin: Positive for rash.     "    Itching of skin     Neurological: Negative for headaches.   Hematological: Negative for adenopathy.   Psychiatric/Behavioral: Negative for behavioral problems and self-injury.           Current Outpatient Medications:      azelastine (ASTELIN) 0.1 % nasal spray, Spray 2 sprays into both nostrils 2 times daily as needed for rhinitis, Disp: 30 mL, Rfl: 3     fluticasone (FLONASE) 50 MCG/ACT nasal spray, Spray 2 sprays into both nostrils daily, Disp: 16 g, Rfl: 3     HYDROcodone-acetaminophen (NORCO) 5-325 MG tablet, Take 1-2 tablets by mouth every 8 hours as needed for moderate to severe pain (Max 4 per day), Disp: 90 tablet, Rfl: 0     metroNIDAZOLE (METROGEL) 1 % external gel, Apply topically daily, Disp: , Rfl:   Immunization History   Administered Date(s) Administered     COVID-19,PF,Moderna 01/28/2021, 02/23/2021, 11/01/2021     Influenza (High Dose) 3 valent vaccine 10/13/2018     Influenza Vaccine, 6+MO IM (QUADRIVALENT W/PRESERVATIVES) 12/30/2017, 11/07/2019     Influenza, Quad, High Dose, Pf, 65yr+ (Fluzone HD) 10/13/2020, 10/29/2021     TDAP Vaccine (Adacel) 11/16/2016     No Known Allergies  OBJECTIVE:                                                                 BP (!) 158/93   Pulse 86   Ht 1.676 m (5' 6\")   Wt 72.6 kg (160 lb)   SpO2 99%   BMI 25.82 kg/m        Physical Exam  Vitals and nursing note reviewed.   Constitutional:       Appearance: Normal appearance.   HENT:      Head: Normocephalic and atraumatic.      Right Ear: Tympanic membrane, ear canal and external ear normal.      Left Ear: Tympanic membrane, ear canal and external ear normal.      Nose: No mucosal edema or rhinorrhea.      Right Turbinates: Not enlarged or swollen.      Left Turbinates: Not enlarged or swollen.      Mouth/Throat:      Mouth: Mucous membranes are moist.      Pharynx: Oropharynx is clear. No oropharyngeal exudate.   Eyes:      General:         Right eye: No discharge.         Left eye: No discharge.      " Conjunctiva/sclera: Conjunctivae normal.   Pulmonary:      Effort: Pulmonary effort is normal.   Neurological:      Mental Status: He is alert and oriented to person, place, and time.   Psychiatric:         Mood and Affect: Mood normal.         Behavior: Behavior normal.           ASSESSMENT/PLAN:    Chronic vasomotor rhinitis  He may have allergic rhinitis; however, considering the age of onset of symptoms, it is unlikely.  He is not interested in allergy skin test today.  States he does not have time.  Not think that he tells me today suggests food allergy.  His symptoms seem to be consistent with vasomotor rhinitis.  Unclear whether there is a history of glaucoma or not.  He has a follow-up with Ophthalmology soon.  -Start intranasal fluticasone (Flonase) 1-2 sprays in each nostril once daily.  -Start azelastine nasal spray, 2 sprays in each nostril twice daily as needed.  Depending on symptom control and ophthalmology evaluation, if he has no glaucoma, may try intranasal ipratropium.      - fluticasone (FLONASE) 50 MCG/ACT nasal spray  Dispense: 16 g; Refill: 3  - azelastine (ASTELIN) 0.1 % nasal spray  Dispense: 30 mL; Refill: 3       Return in about 6 weeks (around 12/21/2021), or if symptoms worsen or fail to improve.    Thank you for allowing us to participate in the care of this patient. Please feel free to contact us if there are any questions or concerns about the patient.    Disclaimer: This note consists of symbols derived from keyboarding, dictation and/or voice recognition software. As a result, there may be errors in the script that have gone undetected. Please consider this when interpreting information found in this chart.    Josue Frazier MD, FAAAAI, FACAAI  Allergy, Asthma and Immunology     [facility]

## 2021-11-09 ENCOUNTER — OFFICE VISIT (OUTPATIENT)
Dept: ALLERGY | Facility: OTHER | Age: 86
End: 2021-11-09
Attending: PHYSICIAN ASSISTANT
Payer: MEDICARE

## 2021-11-09 VITALS
DIASTOLIC BLOOD PRESSURE: 93 MMHG | HEIGHT: 66 IN | SYSTOLIC BLOOD PRESSURE: 158 MMHG | HEART RATE: 86 BPM | OXYGEN SATURATION: 99 % | BODY MASS INDEX: 25.71 KG/M2 | WEIGHT: 160 LBS

## 2021-11-09 DIAGNOSIS — J30.0 CHRONIC VASOMOTOR RHINITIS: Primary | ICD-10-CM

## 2021-11-09 PROCEDURE — 99204 OFFICE O/P NEW MOD 45 MIN: CPT | Performed by: ALLERGY & IMMUNOLOGY

## 2021-11-09 RX ORDER — METRONIDAZOLE 10 MG/G
GEL TOPICAL DAILY
COMMUNITY

## 2021-11-09 RX ORDER — FLUTICASONE PROPIONATE 50 MCG
2 SPRAY, SUSPENSION (ML) NASAL DAILY
Qty: 16 G | Refills: 3 | Status: SHIPPED | OUTPATIENT
Start: 2021-11-09 | End: 2023-08-01

## 2021-11-09 RX ORDER — AZELASTINE 1 MG/ML
2 SPRAY, METERED NASAL 2 TIMES DAILY PRN
Qty: 30 ML | Refills: 3 | Status: SHIPPED | OUTPATIENT
Start: 2021-11-09 | End: 2023-08-01

## 2021-11-09 ASSESSMENT — ENCOUNTER SYMPTOMS
EYE REDNESS: 0
ROS SKIN COMMENTS: ITCHING OF SKIN
STRIDOR: 0
NAUSEA: 0
FACIAL SWELLING: 0
SHORTNESS OF BREATH: 0
WHEEZING: 0
FATIGUE: 0
ACTIVITY CHANGE: 0
MYALGIAS: 0
SINUS PRESSURE: 0
VOMITING: 0
EYE ITCHING: 0
RHINORRHEA: 1
FEVER: 0
HEADACHES: 0
ADENOPATHY: 0
COUGH: 0
EYE DISCHARGE: 0
CHEST TIGHTNESS: 0
DIARRHEA: 1

## 2021-11-09 ASSESSMENT — MIFFLIN-ST. JEOR: SCORE: 1353.51

## 2021-11-09 NOTE — PATIENT INSTRUCTIONS
-start Flonase 1-2 sprays/each nostril once a day.    -Use azelastine 2 sprays in each nostril twice a day when necessary.    Allergy Staff Appt Hours Shot Hours Locations    Physician     Josue Frazier MD       Support Staff     JAYDON Alvarez, LUZ  Tuesday:   [place] :  [place]: :         [place]:  [place]  [place]        Thursday:         Friday: :     [place]        Tuesday: : : : :     [place]       Tues & Wed: : & Thurs:        Fri: :         [facility]  [address] [place] [address]  Appt Line: [phone]    [facility]  [address] [place] [address]  Appt Line: [phone]    Pulmonary Function Scheduling:  [place]: [phone]  [place]: [phone]  [phone]     Important Scheduling Information    Appointments for challenges (oral food challenge, penicillin testing, aspirin desensitization) will need to be scheduled directly through the allergy department.  Please contact them via [employer] [place] or on  and  at [phone].  They will provide additional information and instructions for the appointment.  Discontinue antihistamines 7 days prior to the appointment.    Appointments for skin testing: Appointment will last approximately 45 minutes.  Please call the appointment line for your clinic to schedule.  Discontinue antihistamines 7 days prior to the appointment.    Thank you for trusting us with your Allergy, Asthma, and Immunology care. Please feel free to contact us with any questions or concerns you may have.

## 2021-11-09 NOTE — LETTER
11/9/2021         RE: Luther Kam  [address]  [facility] [address]        Dear Colleague,    Thank you for referring your patient, Luther Kam, to the [facility]. Please see a copy of my visit note below.    SUBJECTIVE:                                                                   Luther Kam is an 86-year-old male who presents today to our Allergy Clinic at [facility]; He is being seen in consultation at the request of Sarah Au PA-C, for food allergy evaluation.     In the past, he always had some GI issues and was diagnosed with IBS.  He may have diarrhea on and off, and it is not unusual for him.    Approximately 1.5 years ago, he noticed that he started developing frequent rhinorrhea and sometimes sneezing.   It starts first thing in the morning but exacerbates when he eats or wears a mask.  Sometimes, when he eats, he has an intense urge to urinate.  When he does that, he is okay after that from urinary standpoint.  He tried and failed oral antihistamines.  Has not tried nasal sprays.  It may happen with any foods he eats, and sometimes without eating.  Perennial pattern without seasonal exacerbations.  No urticaria or angioedema with ingestion of any foods.        Patient Active Problem List   Diagnosis     Irritable bowel syndrome     Rosacea     Alcohol abuse, episodic drinking behavior     Elevated blood pressure reading without diagnosis of hypertension     Low hemoglobin - all his life     Localized edema     Compression fracture of L1 lumbar vertebra with routine healing     Chronic bilateral low back pain without sciatica     Psychophysiological insomnia     Urinary frequency     ABHIJEET (generalized anxiety disorder)     Callus of foot     Open wound of left foot, subsequent encounter       Past Medical History:   Diagnosis Date     Irritable bowel syndrome      Rosacea       Problem (# of Occurrences) Relation (Name,Age  of Onset)    Asthma (1) Brother    Hypertension (1) Mother        Past Surgical History:   Procedure Laterality Date     HC HEMORRHOIDECT EXTERNAL, COMPLETE  1962     HC REPAIR ING HERNIA,5+Y/O,REDUCIBL  2007    right     Social History     Socioeconomic History     Marital status:      Spouse name: None     Number of children: None     Years of education: None     Highest education level: None   Occupational History     Comment: Retired   Tobacco Use     Smoking status: Former Smoker     Packs/day: 0.50     Types: Cigarettes     Smokeless tobacco: Never Used     Tobacco comment: socially, not daily   Vaping Use     Vaping Use: Never used   Substance and Sexual Activity     Alcohol use: Not Currently     Comment: none      Drug use: No     Sexual activity: Not Currently     Partners: Female     Comment: no bc   Other Topics Concern     Parent/sibling w/ CABG, MI or angioplasty before 65F 55M? No   Social History Narrative    ENVIRONMENTAL HISTORY: [place] The home is heated with a forced air. They does not have central air conditioning. The patient's bedroom is furnished with carpeting in bedroom.  Pets inside the house include 0 pets. There is no history of cockroach or mice infestation. There is/are 0 smokers in the house.  The house does not have a damp basement.      Social Determinants of Health     Financial Resource Strain: Not on file   Food Insecurity: Not on file   Transportation Needs: Not on file   Physical Activity: Not on file   Stress: Not on file   Social Connections: Not on file   Intimate Partner Violence: Not on file   Housing Stability: Not on file           Review of Systems   Constitutional: Negative for activity change, fatigue and fever.   HENT: Positive for rhinorrhea and sneezing. Negative for congestion, ear pain, facial swelling, nosebleeds, postnasal drip and sinus pressure.    Eyes: Negative for discharge, redness and itching.  "  Respiratory: Negative for cough, chest tightness, shortness of breath, wheezing and stridor.    Gastrointestinal: Positive for diarrhea. Negative for nausea and vomiting.   Musculoskeletal: Negative for myalgias.   Skin: Positive for rash.        Itching of skin     Neurological: Negative for headaches.   Hematological: Negative for adenopathy.   Psychiatric/Behavioral: Negative for behavioral problems and self-injury.           Current Outpatient Medications:      azelastine (ASTELIN) 0.1 % nasal spray, Spray 2 sprays into both nostrils 2 times daily as needed for rhinitis, Disp: 30 mL, Rfl: 3     fluticasone (FLONASE) 50 MCG/ACT nasal spray, Spray 2 sprays into both nostrils daily, Disp: 16 g, Rfl: 3     HYDROcodone-acetaminophen (NORCO) 5-325 MG tablet, Take 1-2 tablets by mouth every 8 hours as needed for moderate to severe pain (Max 4 per day), Disp: 90 tablet, Rfl: 0     metroNIDAZOLE (METROGEL) 1 % external gel, Apply topically daily, Disp: , Rfl:   Immunization History   Administered Date(s) Administered     COVID-19,PF,Moderna 01/28/2021, 02/23/2021, 11/01/2021     Influenza (High Dose) 3 valent vaccine 10/13/2018     Influenza Vaccine, 6+MO IM (QUADRIVALENT W/PRESERVATIVES) 12/30/2017, 11/07/2019     Influenza, Quad, High Dose, Pf, 65yr+ (Fluzone HD) 10/13/2020, 10/29/2021     TDAP Vaccine (Adacel) 11/16/2016     No Known Allergies  OBJECTIVE:                                                                 BP (!) 158/93   Pulse 86   Ht 1.676 m (5' 6\")   Wt 72.6 kg (160 lb)   SpO2 99%   BMI 25.82 kg/m        Physical Exam  Vitals and nursing note reviewed.   Constitutional:       Appearance: Normal appearance.   HENT:      Head: Normocephalic and atraumatic.      Right Ear: Tympanic membrane, ear canal and external ear normal.      Left Ear: Tympanic membrane, ear canal and external ear normal.      Nose: No mucosal edema or rhinorrhea.      Right Turbinates: Not enlarged or swollen.      Left " Turbinates: Not enlarged or swollen.      Mouth/Throat:      Mouth: Mucous membranes are moist.      Pharynx: Oropharynx is clear. No oropharyngeal exudate.   Eyes:      General:         Right eye: No discharge.         Left eye: No discharge.      Conjunctiva/sclera: Conjunctivae normal.   Pulmonary:      Effort: Pulmonary effort is normal.   Neurological:      Mental Status: He is alert and oriented to person, place, and time.   Psychiatric:         Mood and Affect: Mood normal.         Behavior: Behavior normal.           ASSESSMENT/PLAN:    Chronic vasomotor rhinitis  He may have allergic rhinitis; however, considering the age of onset of symptoms, it is unlikely.  He is not interested in allergy skin test today.  States he does not have time.  Not think that he tells me today suggests food allergy.  His symptoms seem to be consistent with vasomotor rhinitis.  Unclear whether there is a history of glaucoma or not.  He has a follow-up with Ophthalmology soon.  -Start intranasal fluticasone (Flonase) 1-2 sprays in each nostril once daily.  -Start azelastine nasal spray, 2 sprays in each nostril twice daily as needed.  Depending on symptom control and ophthalmology evaluation, if he has no glaucoma, may try intranasal ipratropium.      - fluticasone (FLONASE) 50 MCG/ACT nasal spray  Dispense: 16 g; Refill: 3  - azelastine (ASTELIN) 0.1 % nasal spray  Dispense: 30 mL; Refill: 3       Return in about 6 weeks (around 12/21/2021), or if symptoms worsen or fail to improve.    Thank you for allowing us to participate in the care of this patient. Please feel free to contact us if there are any questions or concerns about the patient.    Disclaimer: This note consists of symbols derived from keyboarding, dictation and/or voice recognition software. As a result, there may be errors in the script that have gone undetected. Please consider this when interpreting information found in this chart.    Josue Frazier MD, FAAAAI,  MARBIN  Allergy, Asthma and Immunology     [facility]       Again, thank you for allowing me to participate in the care of your patient.        Sincerely,        Josue Frazier MD

## 2021-12-06 ENCOUNTER — NURSE TRIAGE (OUTPATIENT)
Dept: FAMILY MEDICINE | Facility: OTHER | Age: 86
End: 2021-12-06
Payer: MEDICARE

## 2021-12-06 DIAGNOSIS — M54.50 CHRONIC BILATERAL LOW BACK PAIN WITHOUT SCIATICA: ICD-10-CM

## 2021-12-06 DIAGNOSIS — G89.29 CHRONIC BILATERAL LOW BACK PAIN WITHOUT SCIATICA: ICD-10-CM

## 2021-12-06 RX ORDER — HYDROCODONE BITARTRATE AND ACETAMINOPHEN 5; 325 MG/1; MG/1
1-2 TABLET ORAL EVERY 8 HOURS PRN
Qty: 90 TABLET | Refills: 0 | Status: SHIPPED | OUTPATIENT
Start: 2021-12-06 | End: 2022-01-31

## 2021-12-06 NOTE — TELEPHONE ENCOUNTER
OK for routine fill. E-prescribed. Please notify patient.    Ruddy Hickey PA-C  [facility]

## 2021-12-06 NOTE — TELEPHONE ENCOUNTER
Reason for Call:  Medication or medication refill:    Do you use a [facility]?  Name of the pharmacy and phone number for the current request:  [facility] - [phone]    Name of the medication requested:  hydrocodone    Other request: none    Can we leave a detailed message on this number? YES    Phone number patient can be reached at: Home number on file [phone] (home)    Best Time: any    Call taken on 12/6/2021 at 7:57 AM by Jeimy Rucker

## 2021-12-06 NOTE — TELEPHONE ENCOUNTER
Patient called to check the status of his prescription. Request was submitted earlier today and forwarded to [place] but has not been sent yet.   Patient says he will call back later to check the status again.    Daniela JENNINGSN, RN

## 2022-01-31 DIAGNOSIS — G89.29 CHRONIC BILATERAL LOW BACK PAIN WITHOUT SCIATICA: ICD-10-CM

## 2022-01-31 DIAGNOSIS — M54.50 CHRONIC BILATERAL LOW BACK PAIN WITHOUT SCIATICA: ICD-10-CM

## 2022-01-31 RX ORDER — HYDROCODONE BITARTRATE AND ACETAMINOPHEN 5; 325 MG/1; MG/1
1-2 TABLET ORAL EVERY 8 HOURS PRN
Qty: 90 TABLET | Refills: 0 | Status: SHIPPED | OUTPATIENT
Start: 2022-01-31 | End: 2022-03-28

## 2022-01-31 NOTE — TELEPHONE ENCOUNTER
reviewed. OK for refill. E-prescribed. Please notify patient.     Ruddy Hickey PA-C  [facility]

## 2022-01-31 NOTE — TELEPHONE ENCOUNTER
Reason for Call:  Medication or medication refill:    Do you use a [facility]?  Name of the pharmacy and phone number for the current request:  [facility] - [phone]    Name of the medication requested: hydrocodone    Other request: none    Can we leave a detailed message on this number?  patient states he has a hard time hearing the phone    Phone number patient can be reached at: Home number on file [phone] (home)    Best Time:  any    Call taken on 1/31/2022 at 7:48 AM by Jeimy Rucker

## 2022-02-07 NOTE — TELEPHONE ENCOUNTER
reviewed. OK for refill. E-prescribed. Please notify patient.    Ruddy Hickey PA-C  [facility]     
Notified patient.  
RN is unable to fill due to not meeting protocol.   Routing to PCP.   Please review and advise pended medication.   Last Seen 05/19/21.     MICHAELA GrossmanN, RN, PHN  [place]  August 10, 2021    
Reason for Call:  Medication or medication refill:    Do you use a [facility]?  Name of the pharmacy and phone number for the current request:  [facility] - [phone]    Name of the medication requested: HYDROcodone-acetaminophen (NORCO) 5-325 MG tablet    Other request: Patient walked into the clinic requesting a refill for his pain medication, patient states he is almost out of his medication and needs the refill asap    Can we leave a detailed message on this number? YES    Phone number patient can be reached at: Home number on file [phone] (home)    Best Time:     Call taken on 8/10/2021 at 7:55 AM by Darlene Buckley      
Unable to leave message no voicemail set up.  Alyssa Henry CMA (AAMA)    
4 = No assist / stand by assistance

## 2022-03-28 DIAGNOSIS — G89.29 CHRONIC BILATERAL LOW BACK PAIN WITHOUT SCIATICA: ICD-10-CM

## 2022-03-28 DIAGNOSIS — M54.50 CHRONIC BILATERAL LOW BACK PAIN WITHOUT SCIATICA: ICD-10-CM

## 2022-03-28 RX ORDER — HYDROCODONE BITARTRATE AND ACETAMINOPHEN 5; 325 MG/1; MG/1
1-2 TABLET ORAL EVERY 8 HOURS PRN
Qty: 90 TABLET | Refills: 0 | Status: SHIPPED | OUTPATIENT
Start: 2022-03-28 | End: 2022-05-20

## 2022-03-28 NOTE — TELEPHONE ENCOUNTER
Reason for Call:  Medication or medication refill:    Do you use a [facility]?  Name of the pharmacy and phone number for the current request:  [facility] - [phone]    Name of the medication requested: hydrocodone    Other request:  Patient states NOT to call him unless there is a question for him.     Can we leave a detailed message on this number?  please do not call    Phone number patient can be reached at: Home number on file [phone] (home)    Best Time:  n/a    Call taken on 3/28/2022 at 7:44 AM by Jeimy Rucker

## 2022-05-19 NOTE — PATIENT INSTRUCTIONS
- Monitor for more spots, increased redness, swelling, pain.   - You can try to use over the counter Lotrimin/Clotrimazole cream.  Use this for 1-2 weeks or until rash is gone.   - Follow-up in clinic if it is not improving or resolved in the next 2-4 weeks, sooner if it is worse or new concerns as listed above.    5

## 2022-05-20 DIAGNOSIS — M54.50 CHRONIC BILATERAL LOW BACK PAIN WITHOUT SCIATICA: ICD-10-CM

## 2022-05-20 DIAGNOSIS — G89.29 CHRONIC BILATERAL LOW BACK PAIN WITHOUT SCIATICA: ICD-10-CM

## 2022-05-20 RX ORDER — HYDROCODONE BITARTRATE AND ACETAMINOPHEN 5; 325 MG/1; MG/1
1-2 TABLET ORAL EVERY 8 HOURS PRN
Qty: 90 TABLET | Refills: 0 | Status: SHIPPED | OUTPATIENT
Start: 2022-05-20 | End: 2022-07-11

## 2022-05-20 NOTE — TELEPHONE ENCOUNTER
Reason for Call:  Medication or medication refill:    Do you use a [facility]?  Name of the pharmacy and phone number for the current request:  [facility] - [phone]    Name of the medication requested:HYDROcodone-acetaminophen (NORCO) 5-325 MG tablet     Other request: patient states he is out of medication and would like a refill today.    Can we leave a detailed message on this number? YES    Phone number patient can be reached at: Home number on file [phone] (home)    Best Time: Anytime    Call taken on 5/20/2022 at 7:53 AM by Hansa Gomez

## 2022-06-20 ENCOUNTER — TELEPHONE (OUTPATIENT)
Dept: FAMILY MEDICINE | Facility: OTHER | Age: 87
End: 2022-06-20

## 2022-06-20 ENCOUNTER — ALLIED HEALTH/NURSE VISIT (OUTPATIENT)
Dept: FAMILY MEDICINE | Facility: OTHER | Age: 87
End: 2022-06-20
Payer: COMMERCIAL

## 2022-06-20 DIAGNOSIS — Z78.9 TRIAGE ASSESSMENT CLASS 3, NONURGENT: Primary | ICD-10-CM

## 2022-06-20 PROCEDURE — 99207 PR NO CHARGE NURSE ONLY: CPT

## 2022-06-20 NOTE — PROGRESS NOTES
Patient walked into clinic.    Cut was looked at and it was the size of a pencil tip with no signs of infection.     It did not appear to have any depth, and only the epidermis was flapped over and was very small, almost invisible.     Per patient, it had some bleeding when it happened Sunday morning but stopped after that.    No fevers, no pain, no redness, no swelling, no drainage, no bleeding.    Patient came in wanting to make sure he was UTD on his Tetanus and he had this done on 11/16/2016.    Huddled with NM:    Ok to leave and let us know if there are any signs of infection.     Patient also had a question related to COVID. States he is vaccinated and boosted. Wondering if he were to test positive at any point but is not having symptoms then should he still get treatment.    Per NM typically no need to treat if not having symptoms and if he is having symptoms then he would refer to monoclonal antibodies treatment.     Explained to patient, he can give us a call if this happens in future and we can go from there but if he tests positive and has any SOB or chest pain then he should go into emergency room.    Patient states understanding but states he was just curious and that's all he needs.    Thelma Escobedo, MICHAELAN, RN  [facility]  June 20, 2022

## 2022-06-20 NOTE — TELEPHONE ENCOUNTER
Patient walked into clinic.    Cut was looked at and it was the size of a pencil tip with no signs of infection.     Huddled with NM:    Ok to leave and let us know if there are any signs of infection.     Thelma Escobedo, MICHAELAN, RN  [place]  June 20, 2022

## 2022-06-20 NOTE — TELEPHONE ENCOUNTER
Reason for call:  Patient reporting a symptom    Symptom or request: Symptom    Duration (how long have symptoms been present): yesterday    Have you been treated for this before? No    Additional comments: Patient states he broke a plate yesterday and cut the bottom of his foot and he is wanting to know if he should get a tetanus shot or possible antibiotics? Patient states he is hard of hearing and doesn't want a phone call, he is going to stop back at the clinic around 9:00 am.     Phone Number patient can be reached at:  Home number on file [phone] (home)    Best Time:  n/a    Can we leave a detailed message on this number:  NO    Call taken on 6/20/2022 at 7:45 AM by Hansa Gomez

## 2022-07-11 DIAGNOSIS — G89.29 CHRONIC BILATERAL LOW BACK PAIN WITHOUT SCIATICA: ICD-10-CM

## 2022-07-11 DIAGNOSIS — M54.50 CHRONIC BILATERAL LOW BACK PAIN WITHOUT SCIATICA: ICD-10-CM

## 2022-07-11 RX ORDER — HYDROCODONE BITARTRATE AND ACETAMINOPHEN 5; 325 MG/1; MG/1
1-2 TABLET ORAL EVERY 8 HOURS PRN
Qty: 90 TABLET | Refills: 0 | Status: SHIPPED | OUTPATIENT
Start: 2022-07-11 | End: 2022-08-26

## 2022-07-11 NOTE — TELEPHONE ENCOUNTER
Reason for Call:  Medication or medication refill:    Do you use a [facility]?  Name of the pharmacy and phone number for the current request:  [facility] - [phone]    Name of the medication requested: HYDROcodone-acetaminophen (NORCO) 5-325 MG tablet    Other request: Patient states no need to call him to tell him that medication has been approved    Can we leave a detailed message on this number? YES    Phone number patient can be reached at: Home number on file [phone] (home)    Best Time: Anytime    Call taken on 7/11/2022 at 7:41 AM by Hansa Gomez

## 2022-07-11 NOTE — TELEPHONE ENCOUNTER
reviewed. OK for refill. E-prescribed. Please notify patient    Ruddy Au-SANYA Lynch  [facility]

## 2022-07-11 NOTE — TELEPHONE ENCOUNTER
Hydrocodone-acetaminophen refill request  Routing refill request to provider for review/approval because:  Drug not on the [facility] refill protocol   Mariama Lynch RN

## 2022-08-03 ENCOUNTER — TELEPHONE (OUTPATIENT)
Dept: FAMILY MEDICINE | Facility: OTHER | Age: 87
End: 2022-08-03

## 2022-08-03 NOTE — TELEPHONE ENCOUNTER
Pt calling back regarding a call he received. No notes for what the call was about so nothing was relayed. Pt did state that he does not need calls once prescriptions are filled if that is related.

## 2022-08-26 ENCOUNTER — TELEPHONE (OUTPATIENT)
Dept: FAMILY MEDICINE | Facility: OTHER | Age: 87
End: 2022-08-26

## 2022-08-26 DIAGNOSIS — G89.29 CHRONIC BILATERAL LOW BACK PAIN WITHOUT SCIATICA: ICD-10-CM

## 2022-08-26 DIAGNOSIS — M54.50 CHRONIC BILATERAL LOW BACK PAIN WITHOUT SCIATICA: ICD-10-CM

## 2022-08-26 RX ORDER — HYDROCODONE BITARTRATE AND ACETAMINOPHEN 5; 325 MG/1; MG/1
1-2 TABLET ORAL EVERY 8 HOURS PRN
Qty: 90 TABLET | Refills: 0 | Status: SHIPPED | OUTPATIENT
Start: 2022-08-26 | End: 2022-10-14

## 2022-08-26 NOTE — TELEPHONE ENCOUNTER
Medication Question or Refill        What medication are you calling about (include dose and sig)?:  hydrocodone    Controlled Substance Agreement on file:   CSA -- Patient Level:    Controlled Substance Agreement - Opioid - Scan on 4/18/2021  4:19 PM  Controlled Substance Agreement - Opioid - Scan on 8/5/2019  3:04 PM: Opiod/Opiod Plus Controlled Substance Agreement 08/05/19       Who prescribed the medication?:  Sarah Hickey    Do you need a refill? Yes:     When did you use the medication last?  n/a    Patient offered an appointment? No    Do you have any questions or concerns?  No    Preferred Pharmacy:   [facility], [place] - [address] [address]  Phone: [phone] Fax: [phone]    [facility] [address]  Phone: [phone] Fax: [phone]      Per patient, only call him if there is an issue with the refill.  Thank you.

## 2022-10-14 ENCOUNTER — OFFICE VISIT (OUTPATIENT)
Dept: FAMILY MEDICINE | Facility: OTHER | Age: 87
End: 2022-10-14
Payer: MEDICARE

## 2022-10-14 VITALS
HEIGHT: 66 IN | TEMPERATURE: 99 F | DIASTOLIC BLOOD PRESSURE: 78 MMHG | RESPIRATION RATE: 20 BRPM | HEART RATE: 88 BPM | WEIGHT: 163 LBS | BODY MASS INDEX: 26.2 KG/M2 | SYSTOLIC BLOOD PRESSURE: 136 MMHG

## 2022-10-14 DIAGNOSIS — G89.29 CHRONIC BILATERAL LOW BACK PAIN WITHOUT SCIATICA: ICD-10-CM

## 2022-10-14 DIAGNOSIS — R35.0 URINARY FREQUENCY: Primary | ICD-10-CM

## 2022-10-14 DIAGNOSIS — N30.00 ACUTE CYSTITIS WITHOUT HEMATURIA: ICD-10-CM

## 2022-10-14 DIAGNOSIS — M54.50 CHRONIC BILATERAL LOW BACK PAIN WITHOUT SCIATICA: ICD-10-CM

## 2022-10-14 LAB
ALBUMIN UR-MCNC: ABNORMAL MG/DL
APPEARANCE UR: ABNORMAL
BACTERIA #/AREA URNS HPF: ABNORMAL /HPF
BILIRUB UR QL STRIP: NEGATIVE
COLOR UR AUTO: YELLOW
GLUCOSE UR STRIP-MCNC: NEGATIVE MG/DL
HGB UR QL STRIP: ABNORMAL
KETONES UR STRIP-MCNC: NEGATIVE MG/DL
LEUKOCYTE ESTERASE UR QL STRIP: ABNORMAL
NITRATE UR QL: POSITIVE
PH UR STRIP: 6 [PH] (ref 5–7)
RBC #/AREA URNS AUTO: ABNORMAL /HPF
SP GR UR STRIP: 1.01 (ref 1–1.03)
UROBILINOGEN UR STRIP-ACNC: 0.2 E.U./DL
WBC #/AREA URNS AUTO: ABNORMAL /HPF

## 2022-10-14 PROCEDURE — 87086 URINE CULTURE/COLONY COUNT: CPT | Performed by: PHYSICIAN ASSISTANT

## 2022-10-14 PROCEDURE — 87186 SC STD MICRODIL/AGAR DIL: CPT | Performed by: PHYSICIAN ASSISTANT

## 2022-10-14 PROCEDURE — 81001 URINALYSIS AUTO W/SCOPE: CPT | Performed by: PHYSICIAN ASSISTANT

## 2022-10-14 PROCEDURE — 99214 OFFICE O/P EST MOD 30 MIN: CPT | Performed by: PHYSICIAN ASSISTANT

## 2022-10-14 RX ORDER — HYDROCODONE BITARTRATE AND ACETAMINOPHEN 5; 325 MG/1; MG/1
1-2 TABLET ORAL EVERY 8 HOURS PRN
Qty: 90 TABLET | Refills: 0 | Status: SHIPPED | OUTPATIENT
Start: 2022-10-14 | End: 2022-12-01

## 2022-10-14 RX ORDER — CEPHALEXIN 500 MG/1
500 CAPSULE ORAL 2 TIMES DAILY
Qty: 14 CAPSULE | Refills: 0 | Status: SHIPPED | OUTPATIENT
Start: 2022-10-14 | End: 2022-10-21

## 2022-10-14 ASSESSMENT — PAIN SCALES - GENERAL: PAINLEVEL: NO PAIN (0)

## 2022-10-14 NOTE — PROGRESS NOTES
Assessment & Plan     Urinary frequency  - UA Macro with Reflex to Micro and Culture - lab collect; Future  - UA Macro with Reflex to Micro and Culture - lab collect  - Urine Microscopic Exam  - Urine Culture    Chronic bilateral low back pain without sciatica  Refilled medication for a month. Overdue for wellness and general med check, didn't have time today so will follow-up in the next month.   - HYDROcodone-acetaminophen (NORCO) 5-325 MG tablet; Take 1-2 tablets by mouth every 8 hours as needed for moderate to severe pain (Max 4 per day)    Acute cystitis without hematuria  UA and symptoms are suggestive of acute UTI.  Will start him on Cephalexin twice daily for a week. Encouraged to increase water intake. Do not feel the penile lesion is associated with UTI.  There is just general erythema without signs of infection or inflammation, recommended using Lotrimin twice daily at this time. Recheck at next visit, sooner if worse. Same with his UTI  Symptoms.   - cephALEXin (KEFLEX) 500 MG capsule; Take 1 capsule (500 mg) by mouth 2 times daily for 7 days        Return in about 4 weeks (around 11/11/2022) for Physical Exam, Medication Re-check, Lab Work.  Follow-up sooner if UTI symptoms are not improving in the next couple of days.       Options for treatment and follow-up care were reviewed with the patient and/or guardian. Patient and/or guardian engaged in the decision making process and verbalized understanding of the options discussed and agreed with the final plan.    [facility]ANYA Garnett [facility] S[facility] is a 87 year old, presenting for the following health issues:  No chief complaint on file.      HPI     Urinary frequency started yesterday. Has had incontinence. Pt also noticed redness and blisters on his penis. He states that he was seen for this in May 2021.     Genitourinary - Male  Onset/Duration: 24 hours  Description:   Dysuria (painful urination):  YES}  Hematuria (blood in urine): No  Frequency: YES- more so than he has had in the past.    Waking at night to urinate: No  Hesitancy (delay in urine): YES  Retention (unable to empty): No  Decrease in urinary flow: No  Incontinence: YES  Progression of Symptoms:  same  Accompanying Signs & Symptoms:  Fever: YES - he normally is around 97 and today was 99.  Back/Flank pain: No  Urethral discharge: No  Testicle lumps/masses/pain: Has a sore on the tip of the penis, has had in past, usually lotrimin resolves the symptoms.   Nausea and/or vomiting: No  Abdominal pain: No  History:   History of frequent UTI s: No  History of kidney stones: No  History of hernias: No  Personal or Family history of Prostate problems: No  Sexually active: No  Precipitating or alleviating factors: None  Therapies tried and outcome: none        Review of Systems   Constitutional, HEENT, cardiovascular, pulmonary, gi and gu systems are negative, except as otherwise noted.      Objective    There were no vitals taken for this visit.  There is no height or weight on file to calculate BMI.  Physical Exam   GENERAL: healthy, alert and no distress   (male): testicles normal without atrophy or masses, no hernias and Glans penis there is an area of macular erythema, no blistering or masses present, there is no discharge noted, no signs of swelling.  Urethra is normal. Shaft of penis normal.   MS: no gross musculoskeletal defects noted, no edema  NEURO: Normal strength and tone, mentation intact and speech normal  PSYCH: mentation appears normal, affect normal/bright    Results for orders placed or performed in visit on 10/14/22   UA Macro with Reflex to Micro and Culture - lab collect     Status: Abnormal    Specimen: Urine, NOS   Result Value Ref Range    Color Urine Yellow Colorless, Straw, Light Yellow, Yellow    Appearance Urine Slightly Cloudy (A) Clear    Glucose Urine Negative Negative mg/dL    Bilirubin Urine Negative Negative    Ketones  Urine Negative Negative mg/dL    Specific Gravity Urine 1.015 1.003 - 1.035    Blood Urine Moderate (A) Negative    pH Urine 6.0 5.0 - 7.0    Protein Albumin Urine Trace (A) Negative mg/dL    Urobilinogen Urine 0.2 0.2, 1.0 E.U./dL    Nitrite Urine Positive (A) Negative    Leukocyte Esterase Urine Large (A) Negative   Urine Microscopic Exam     Status: Abnormal   Result Value Ref Range    Bacteria Urine Many (A) None Seen /HPF    RBC Urine 0-2 0-2 /HPF /HPF    WBC Urine  (A) 0-5 /HPF /HPF

## 2022-10-14 NOTE — PATIENT INSTRUCTIONS
Urine infection:  - Start the Cephalexin.  Take 1 tablet twice per day with or without food.   Take until gone.   - Increase water intake as well to try and flush out the infection.     Penis sore:  - Start using the Lotrimine apply twice per day for a couple of weeks.

## 2022-10-16 LAB — BACTERIA UR CULT: ABNORMAL

## 2022-10-17 ENCOUNTER — TELEPHONE (OUTPATIENT)
Dept: FAMILY MEDICINE | Facility: OTHER | Age: 87
End: 2022-10-17

## 2022-10-17 NOTE — TELEPHONE ENCOUNTER
----- Message from Gadiel Ma PA-C sent at 10/17/2022  9:25 AM CDT -----  Please call patient. His Culture confirmed the infection, the antibiotic he is on should have been effective, if not improving please let me know.     Gadiel Ma PA-C

## 2022-10-17 NOTE — TELEPHONE ENCOUNTER
Attempted to call patient with the following message below. Unable to leave voicemail.  Zhanna Guillermo MA

## 2022-10-17 NOTE — TELEPHONE ENCOUNTER
"Patient walked into clinic stating he has received multiple calls and he does not know why but \"possibly lab results\"    Explained to patient that his culture came back and per ES his culture confirmed infection, and the antibiotic he is on should have been effective.     Patient states \"she already told me that in clinic so why are they calling me to tell me again,\" This nurse explained that was the UA sample and now the culture came back, and the antibiotic should be working.     Patient states understanding and states his symptoms are improving. No further questions or concerns.     Thelma Escobedo, MICHAELAN, RN  [facility]  October 17, 2022     "

## 2022-11-07 ENCOUNTER — TELEPHONE (OUTPATIENT)
Dept: FAMILY MEDICINE | Facility: OTHER | Age: 87
End: 2022-11-07

## 2022-11-07 DIAGNOSIS — Z13.220 SCREENING FOR LIPOID DISORDERS: ICD-10-CM

## 2022-11-07 DIAGNOSIS — Z13.1 SCREENING FOR DIABETES MELLITUS: ICD-10-CM

## 2022-11-07 DIAGNOSIS — D64.9 LOW HEMOGLOBIN: Primary | ICD-10-CM

## 2022-11-07 DIAGNOSIS — R60.0 LOCALIZED EDEMA: ICD-10-CM

## 2022-11-07 NOTE — TELEPHONE ENCOUNTER
Order/Referral Request    Who is requesting: Patient    Orders being requested: for physical and wants to come in early for his lab work.  Please don't call he is just going to show up in the morning.      Reason service is needed/diagnosis: n/a    When are orders needed by: 11/8/2022    Has this been discussed with Provider: No    Does patient have a preference on a Group/Provider/Facility? [facility]    Does patient have an appointment scheduled?: Yes: 11/8/2022

## 2022-11-08 ENCOUNTER — LAB (OUTPATIENT)
Dept: LAB | Facility: OTHER | Age: 87
End: 2022-11-08
Payer: MEDICARE

## 2022-11-08 ENCOUNTER — OFFICE VISIT (OUTPATIENT)
Dept: FAMILY MEDICINE | Facility: OTHER | Age: 87
End: 2022-11-08
Payer: MEDICARE

## 2022-11-08 VITALS
HEIGHT: 66 IN | DIASTOLIC BLOOD PRESSURE: 78 MMHG | TEMPERATURE: 97.4 F | BODY MASS INDEX: 25.96 KG/M2 | HEART RATE: 82 BPM | SYSTOLIC BLOOD PRESSURE: 126 MMHG | WEIGHT: 161.5 LBS | RESPIRATION RATE: 22 BRPM | OXYGEN SATURATION: 99 %

## 2022-11-08 DIAGNOSIS — G89.29 CHRONIC BILATERAL LOW BACK PAIN WITHOUT SCIATICA: ICD-10-CM

## 2022-11-08 DIAGNOSIS — Z13.220 SCREENING FOR LIPOID DISORDERS: ICD-10-CM

## 2022-11-08 DIAGNOSIS — L57.0 ACTINIC KERATOSIS: ICD-10-CM

## 2022-11-08 DIAGNOSIS — D64.9 LOW HEMOGLOBIN: ICD-10-CM

## 2022-11-08 DIAGNOSIS — R60.0 LOCALIZED EDEMA: ICD-10-CM

## 2022-11-08 DIAGNOSIS — B35.6 TINEA OF GROIN: ICD-10-CM

## 2022-11-08 DIAGNOSIS — F10.11 HISTORY OF ALCOHOL ABUSE: ICD-10-CM

## 2022-11-08 DIAGNOSIS — M54.50 CHRONIC BILATERAL LOW BACK PAIN WITHOUT SCIATICA: ICD-10-CM

## 2022-11-08 DIAGNOSIS — Z00.00 ENCOUNTER FOR MEDICARE ANNUAL WELLNESS EXAM: Primary | ICD-10-CM

## 2022-11-08 DIAGNOSIS — Z13.1 SCREENING FOR DIABETES MELLITUS: ICD-10-CM

## 2022-11-08 DIAGNOSIS — R35.0 URINARY FREQUENCY: ICD-10-CM

## 2022-11-08 DIAGNOSIS — Z00.00 LABORATORY EXAMINATION ORDERED AS PART OF A ROUTINE GENERAL MEDICAL EXAMINATION: Primary | ICD-10-CM

## 2022-11-08 LAB
ALBUMIN SERPL-MCNC: 3.7 G/DL (ref 3.4–5)
ALBUMIN UR-MCNC: NEGATIVE MG/DL
ALP SERPL-CCNC: 60 U/L (ref 40–150)
ALT SERPL W P-5'-P-CCNC: 26 U/L (ref 0–70)
ANION GAP SERPL CALCULATED.3IONS-SCNC: 5 MMOL/L (ref 3–14)
APPEARANCE UR: CLEAR
AST SERPL W P-5'-P-CCNC: 18 U/L (ref 0–45)
BILIRUB DIRECT SERPL-MCNC: 0.2 MG/DL (ref 0–0.2)
BILIRUB SERPL-MCNC: 0.5 MG/DL (ref 0.2–1.3)
BILIRUB UR QL STRIP: NEGATIVE
BUN SERPL-MCNC: 17 MG/DL (ref 7–30)
CALCIUM SERPL-MCNC: 9.2 MG/DL (ref 8.5–10.1)
CHLORIDE BLD-SCNC: 101 MMOL/L (ref 94–109)
CHOLEST SERPL-MCNC: 146 MG/DL
CO2 SERPL-SCNC: 28 MMOL/L (ref 20–32)
COLOR UR AUTO: YELLOW
CREAT SERPL-MCNC: 0.64 MG/DL (ref 0.66–1.25)
GFR SERPL CREATININE-BSD FRML MDRD: >90 ML/MIN/1.73M2
GLUCOSE BLD-MCNC: 90 MG/DL (ref 70–99)
GLUCOSE UR STRIP-MCNC: NEGATIVE MG/DL
HDLC SERPL-MCNC: 92 MG/DL
HGB UR QL STRIP: ABNORMAL
HOLD SPECIMEN: NORMAL
KETONES UR STRIP-MCNC: NEGATIVE MG/DL
LDLC SERPL CALC-MCNC: 47 MG/DL
LEUKOCYTE ESTERASE UR QL STRIP: NEGATIVE
NITRATE UR QL: NEGATIVE
NONHDLC SERPL-MCNC: 54 MG/DL
PH UR STRIP: 5.5 [PH] (ref 5–7)
POTASSIUM BLD-SCNC: 4.3 MMOL/L (ref 3.4–5.3)
PROT SERPL-MCNC: 7.2 G/DL (ref 6.8–8.8)
RBC #/AREA URNS AUTO: NORMAL /HPF
SODIUM SERPL-SCNC: 134 MMOL/L (ref 133–144)
SP GR UR STRIP: 1.01 (ref 1–1.03)
TRIGL SERPL-MCNC: 37 MG/DL
UROBILINOGEN UR STRIP-ACNC: 0.2 E.U./DL
WBC #/AREA URNS AUTO: NORMAL /HPF

## 2022-11-08 PROCEDURE — 81001 URINALYSIS AUTO W/SCOPE: CPT | Performed by: PHYSICIAN ASSISTANT

## 2022-11-08 PROCEDURE — 80061 LIPID PANEL: CPT

## 2022-11-08 PROCEDURE — 36415 COLL VENOUS BLD VENIPUNCTURE: CPT

## 2022-11-08 PROCEDURE — 17000 DESTRUCT PREMALG LESION: CPT | Performed by: PHYSICIAN ASSISTANT

## 2022-11-08 PROCEDURE — 80053 COMPREHEN METABOLIC PANEL: CPT

## 2022-11-08 PROCEDURE — G0439 PPPS, SUBSEQ VISIT: HCPCS | Performed by: PHYSICIAN ASSISTANT

## 2022-11-08 PROCEDURE — 82248 BILIRUBIN DIRECT: CPT

## 2022-11-08 PROCEDURE — 99214 OFFICE O/P EST MOD 30 MIN: CPT | Mod: 25 | Performed by: PHYSICIAN ASSISTANT

## 2022-11-08 RX ORDER — NYSTATIN 100000 U/G
CREAM TOPICAL 2 TIMES DAILY
Qty: 30 G | Refills: 1 | Status: SHIPPED | OUTPATIENT
Start: 2022-11-08 | End: 2023-08-01

## 2022-11-08 ASSESSMENT — ENCOUNTER SYMPTOMS
DIARRHEA: 0
SHORTNESS OF BREATH: 0
HEARTBURN: 0
NAUSEA: 0
HEADACHES: 0
COUGH: 0
FREQUENCY: 1
HEMATOCHEZIA: 0
WEAKNESS: 1
DYSURIA: 0
PALPITATIONS: 0
PARESTHESIAS: 0
CONSTIPATION: 0
DIZZINESS: 0
EYE PAIN: 0
FEVER: 0
CHILLS: 0
MYALGIAS: 0
ABDOMINAL PAIN: 0
ARTHRALGIAS: 0
HEMATURIA: 0
SORE THROAT: 0
NERVOUS/ANXIOUS: 0
JOINT SWELLING: 0

## 2022-11-08 ASSESSMENT — PAIN SCALES - GENERAL: PAINLEVEL: NO PAIN (0)

## 2022-11-08 ASSESSMENT — ACTIVITIES OF DAILY LIVING (ADL): CURRENT_FUNCTION: TRANSPORTATION REQUIRES ASSISTANCE

## 2022-11-08 NOTE — PATIENT INSTRUCTIONS
Patient Education   Personalized Prevention Plan  You are due for the preventive services outlined below.  Your care team is available to assist you in scheduling these services.  If you have already completed any of these items, please share that information with your care team to update in your medical record.  Health Maintenance Due   Topic Date Due     ANNUAL REVIEW OF HM ORDERS  Never done     Zoster (Shingles) Vaccine (1 of 2) Never done     Annual Wellness Visit  Never done     Pneumococcal Vaccine (1 - PCV) Never done     URINE DRUG SCREEN  04/07/2022     COVID-19 Vaccine (5 - Booster for Moderna series) 06/07/2022     Your Health Risk Assessment indicates you feel you are not in good health    A healthy lifestyle helps keep the body fit and the mind alert. It helps protect you from disease, helps you fight disease, and helps prevent chronic disease (disease that doesn't go away) from getting worse. This is important as you get older and begin to notice twinges in muscles and joints and a decline in the strength and stamina you once took for granted. A healthy lifestyle includes good healthcare, good nutrition, weight control, recreation, and regular exercise. Avoid harmful substances and do what you can to keep safe. Another part of a healthy lifestyle is stay mentally active and socially involved.    Good healthcare     Have a wellness visit every year.     If you have new symptoms, let us know right away. Don't wait until the next checkup.     Take medicines exactly as prescribed and keep your medicines in a safe place. Tell us if your medicine causes problems.   Healthy diet and weight control     Eat 3 or 4 small, nutritious, low-fat, high-fiber meals a day. Include a variety of fruits, vegetables, and whole-grain foods.     Make sure you get enough calcium in your diet. Calcium, vitamin D, and exercise help prevent osteoporosis (bone thinning).     If you live alone, try eating with others when you  can. That way you get a good meal and have company while you eat it.     Try to keep a healthy weight. If you eat more calories than your body uses for energy, it will be stored as fat and you will gain weight.     Recreation   Recreation is not limited to sports and team events. It includes any activity that provides relaxation, interest, enjoyment, and exercise. Recreation provides an outlet for physical, mental, and social energy. It can give a sense of worth and achievement. It can help you stay healthy.    Mental Exercise and Social Involvement  Mental and emotional health is as important as physical health. Keep in touch with friends and family. Stay as active as possible. Continue to learn and challenge yourself.   Things you can do to stay mentally active are:    Learn something new, like a foreign language or musical instrument.     Play SCRABBLE or do crossword puzzles. If you cannot find people to play these games with you at home, you can play them with others on your computer through the Internet.     Join a games club--anything from card games to chess or checkers or lawn bowling.     Start a new hobby.     Go back to school.     Volunteer.     Read.   Keep up with world events.    Exercise for a Healthier Heart  You may wonder how you can improve the health of your heart. If you re thinking about exercise, you re on the right track. You don t need to become an athlete. But you do need a certain amount of brisk exercise to help strengthen your heart. If you have been diagnosed with a heart condition, your healthcare provider may advise exercise to help stabilize your condition. To help make exercise a habit, choose safe, fun activities.      Exercise with a friend. When activity is fun, you're more likely to stick with it.   Before you start  Check with your healthcare provider before starting an exercise program. This is especially important if you have not been active for a while. It's also important  if you have a long-term (chronic) health problem such as heart disease, diabetes, or obesity. Or if you are at high risk for having these problems.   Why exercise?  Exercising regularly offers many healthy rewards. It can help you do all of the following:     Improve your blood cholesterol level to help prevent further heart trouble    Lower your blood pressure to help prevent a stroke or heart attack    Control diabetes, or reduce your risk of getting this disease    Improve your heart and lung function    Reach and stay at a healthy weight    Make your muscles stronger so you can stay active    Prevent falls and fractures by slowing the loss of bone mass (osteoporosis)    Manage stress better    Reduce your blood pressure    Improve your sense of self and your body image  Exercise tips      Ease into your routine. Set small goals. Then build on them. If you are not sure what your activity level should be, talk with your healthcare provider first before starting an exercise routine.    Exercise on most days. Aim for a total of 150 minutes (2 hours and 30 minutes) or more of moderate-intensity aerobic activity each week. Or 75 minutes (1 hour and 15 minutes) or more of vigorous-intensity aerobic activity each week. Or try for a combination of both. Moderate activity means that you breathe heavier and your heart rate increases but you can still talk. Think about doing 40 minutes of moderate exercise, 3 to 4 times a week. For best results, activity should last for about 40 minutes to lower blood pressure and cholesterol. It's OK to work up to the 40-minute period over time. Examples of moderate-intensity activity are walking 1 mile in 15 minutes. Or doing 30 to 45 minutes of yard work.    Step up your daily activity level.  Along with your exercise program, try being more active the whole day. Walk instead of drive. Or park further away so that you take more steps each day. Do more household tasks or yard work. You  may not be able to meet the advised mount of physical activity. But doing some moderate- or vigorous-intensity aerobic activity can help reduce your risk for heart disease. Your healthcare provider can help you figure out what is best for you.    Choose 1 or more activities you enjoy.  Walking is one of the easiest things you can do. You can also try swimming, riding a bike, dancing, or taking an exercise class.    When to call your healthcare provider  Call your healthcare provider if you have any of these:     Chest pain or feel dizzy or lightheaded    Burning, tightness, pressure, or heaviness in your chest, neck, shoulders, back, or arms    Abnormal shortness of breath    More joint or muscle pain    A very fast or irregular heartbeat (palpitations)  [employer] last reviewed this educational content on 7/1/2019 2000-2021 [employer]. All rights reserved. This information is not intended as a substitute for professional medical care. Always follow your healthcare professional's instructions.        Activities of Daily Living    Your Health Risk Assessment indicates you have difficulties with activities of daily living such as housework, bathing, preparing meals, taking medication, etc. Please make a follow up appointment for us to address this issue in more detail.    Signs of Hearing Loss      Hearing much better with one ear can be a sign of hearing loss.   Hearing loss is a problem shared by many people. In fact, it is one of the most common health problems, particularly as people age. Most people age 65 and older have some hearing loss. By age 80, almost everyone does. Hearing loss often occurs slowly over the years. So you may not realize your hearing has gotten worse.  Have your hearing checked  Call your healthcare provider if you:    Have to strain to hear normal conversation    Have to watch other people s faces very carefully to follow what they re saying    Need to ask people to repeat  what they ve said    Often misunderstand what people are saying    Turn the volume of the television or radio up so high that others complain    Feel that people are mumbling when they re talking to you    Find that the effort to hear leaves you feeling tired and irritated    Notice, when using the phone, that you hear better with one ear than the other  Paul last reviewed this educational content on 1/1/2020 2000-2021 [employer]. All rights reserved. This information is not intended as a substitute for professional medical care. Always follow your healthcare professional's instructions.          Urinary Incontinence (Male)    Urinary incontinence means not being able to control the release of urine from the bladder.   Causes  Common causes of urinary incontinence in men include:    Infection    Certain medicines    Aging    Poor pelvic muscle tone    Bladder spasms    Obesity    Trouble urinating and fully emptying the bladder (urinary retention)  Other things that can cause incontinence are:     Nervous system diseases    Diabetes    Sleep apnea    Urinary tract infections    Prostate surgery    Pelvic injury  Constipation and smoking have also been identified as risk factors.   Symptoms    Urge incontinence (overactive bladder). This is a sudden urge to urinate. It occurs even though there may not be much urine in the bladder. The need to urinate often during the night is common. It's due to bladder spasms.    Stress incontinence. This is urine leakage that you can't control. It can occur with sneezing, coughing, and other actions that put stress on the bladder.    Treatment  Treatment depends on what is causing the condition. Bladder infections are treated with antibiotics. Urinary retention is treated with a bladder catheter.   Home care  Follow these guidelines when caring for yourself at home:    Don't have any foods and drinks that may irritate the bladder. This  includes:  ? Chocolate  ? Alcohol  ? Caffeine  ? Carbonated drinks  ? Acidic fruits and juices    Limit fluids to 6 to 8 cups a day.    Lose weight if you are overweight. This will reduce your symptoms.    If advised, do regular pelvic muscle-strengthening exercises such as Kegel exercises.    If needed, wear absorbent pads to catch urine. Change the pads often. This is for good hygiene and to prevent skin and bladder infections.    Bathe daily for good hygiene.    If an antibiotic was prescribed to treat a bladder infection, take it until it's finished. Keep taking it even if you are feeling better. This is to make sure your infection has cleared.    If a catheter was left in place, keep bacteria from getting into the collection bag. Don't disconnect the catheter from the collection bag.    Use a leg band to secure the catheter drainage tube, so it does not pull on the catheter. Drain the collection bag when it becomes full. To do this, use the drain spout at the bottom of the bag. Don't disconnect the bag from the catheter.    Don't pull on or try to remove a catheter. The catheter must be removed by a healthcare provider.    If you smoke, stop. Ask your provider for help if you can't do this on your own.  Follow-up care  Follow up with your healthcare provider, or as advised.  When to get medical advice  Call your healthcare provider right away if any of these occur:    Fever over 100.4 F (38 C), or as directed by your provider    Bladder pain or fullness    Belly swelling, nausea, or vomiting    Back pain    Weakness, dizziness, or fainting    If a catheter was left in place, return if:  ? The catheter falls out  ? The catheter stops draining for 6 hours  ? Your urine gets cloudy or smells bad  Paul last reviewed this educational content on 1/1/2020 2000-2021 [employer]. All rights reserved. This information is not intended as a substitute for professional medical care. Always follow your  healthcare professional's instructions.        Your Health Risk Assessment indicates you feel you are not in good emotional health.    Recreation   Recreation is not limited to sports and team events. It includes any activity that provides relaxation, interest, enjoyment, and exercise. Recreation provides an outlet for physical, mental, and social energy. It can give a sense of worth and achievement. It can help you stay healthy.    Mental Exercise and Social Involvement  Mental and emotional health is as important as physical health. Keep in touch with friends and family. Stay as active as possible. Continue to learn and challenge yourself.   Things you can do to stay mentally active are:    Learn something new, like a foreign language or musical instrument.     Play SCRABBLE or do crossword puzzles. If you cannot find people to play these games with you at home, you can play them with others on your computer through the Internet.     Join a games club--anything from card games to chess or checkers or lawn bowling.     Start a new hobby.     Go back to school.     Volunteer.     Read.   Keep up with world events.

## 2022-11-08 NOTE — PROGRESS NOTES
"SUBJECTIVE:   Luther is a 87 year old who presents for Preventive Visit.      Patient has been advised of split billing requirements and indicates understanding: Yes  Are you in the first 12 months of your Medicare coverage?  No    Healthy Habits:     In general, how would you rate your overall health?  Fair    Frequency of exercise:  None    Do you usually eat at least 4 servings of fruit and vegetables a day, include whole grains    & fiber and avoid regularly eating high fat or \"junk\" foods?  Yes    Taking medications regularly:  Yes    Medication side effects:  None    Ability to successfully perform activities of daily living:  Transportation requires assistance    Home Safety:  Lack of grab bars in the bathroom    Hearing Impairment:  Difficulty following a conversation in a noisy restaurant or crowded room, feel that people are mumbling or not speaking clearly, difficulty following dialogue in the theater, difficult to understand a speaker at a public meeting or [employer] service, need to ask people to speak up or repeat themselves, find that men's voices are easier to understand than woman's, difficulty understanding soft or whispered speech and difficulty understanding speech on the telephone    In the past 6 months, have you been bothered by leaking of urine? Yes    In general, how would you rate your overall mental or emotional health?  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 Yes    Have you ever done Advance Care Planning? (For example, a Health Directive, POLST, or a discussion with a medical provider or your loved ones about your wishes): Yes, " patient states has an Advance Care Planning document and will bring a copy to the clinic.       Fall risk  Fallen 2 or more times in the past year?: No  Any fall with injury in the past year?: No    Cognitive Screening   1) Repeat 3 items (Leader, Season, Table)    2) Clock draw: ABNORMAL cross  3) 3 item recall: Recalls 1 object   Results: ABNORMAL clock, 1-2 items recalled: PROBABLE COGNITIVE IMPAIRMENT, **INFORM PROVIDER**    Mini-CogTM Copyright MIRTHA Sifuentes. Licensed by the author for use in [facility]; reprinted with permission (dax@[facility]). All rights reserved.      Do you have sleep apnea, excessive snoring or daytime drowsiness?: no    Reviewed and updated as needed this visit by clinical staff   Tobacco  Allergies  Meds  Problems  Med Hx  Surg Hx  Fam Hx        Reviewed and updated as needed this visit by Provider   Tobacco  Allergies  Meds  Problems  Med Hx  Surg Hx  Fam Hx         Social History     Tobacco Use     Smoking status: Former     Packs/day: 0.50     Types: Cigarettes     Smokeless tobacco: Never     Tobacco comments:     socially, not daily   Substance Use Topics     Alcohol use: Not Currently     Comment: none        No flowsheet data found.        Current providers sharing in care for this patient include:   Patient Care Team:  Sarah Hickey PA-C as PCP - General (Physician Assistant - Medical)  Anil Gerard DPM as Assigned Musculoskeletal Provider  Josue Frazier MD as Assigned Allergy Provider  Gadiel Ma PA-C as Assigned PCP    The following health maintenance items are reviewed in Epic and correct as of today:  Health Maintenance   Topic Date Due     ANNUAL REVIEW OF HM ORDERS  Never done     HEPATITIS B IMMUNIZATION (1 of 3 - 3-dose series) Never done     ZOSTER IMMUNIZATION (1 of 2) Never done     MEDICARE ANNUAL WELLNESS VISIT  Never done     Pneumococcal Vaccine: 65+ Years (1 - PCV) Never done     FALL RISK ASSESSMENT   "08/13/2021     PHQ-2 (once per calendar year)  01/01/2022     URINE DRUG SCREEN  04/07/2022     COVID-19 Vaccine (5 - Booster for Moderna series) 06/07/2022     ADVANCE CARE PLANNING  04/03/2023     DTAP/TDAP/TD IMMUNIZATION (2 - Td or Tdap) 11/16/2026     INFLUENZA VACCINE  Completed     IPV IMMUNIZATION  Aged Out     MENINGITIS IMMUNIZATION  Aged Out         Review of Systems   Constitutional: Negative for chills and fever.   HENT: Positive for hearing loss. Negative for congestion, ear pain and sore throat.    Eyes: Positive for visual disturbance. Negative for pain.   Respiratory: Negative for cough and shortness of breath.    Cardiovascular: Positive for peripheral edema. Negative for chest pain and palpitations.   Gastrointestinal: Negative for abdominal pain, constipation, diarrhea, heartburn, hematochezia and nausea.   Genitourinary: Positive for frequency. Negative for dysuria, genital sores, hematuria, impotence, penile discharge and urgency.   Musculoskeletal: Negative for arthralgias, joint swelling and myalgias.   Skin: Positive for rash.   Neurological: Positive for weakness. Negative for dizziness, headaches and paresthesias.   Psychiatric/Behavioral: Positive for mood changes. The patient is not nervous/anxious.      OBJECTIVE:   There were no vitals taken for this visit. Estimated body mass index is 26.67 kg/m  as calculated from the following:    Height as of 10/14/22: 5' 5.55\" (1.665 m).    Weight as of 10/14/22: 163 lb (73.9 kg).  Physical Exam  Constitutional:       General: He is not in acute distress.     Appearance: He is well-developed. He is not ill-appearing.   HENT:      Head: Normocephalic and atraumatic.      Right Ear: Tympanic membrane, ear canal and external ear normal.      Left Ear: Tympanic membrane, ear canal and external ear normal.      Mouth/Throat:      Mouth: Mucous membranes are moist. No oral lesions.   Eyes:      Extraocular Movements: Extraocular movements intact.      " Conjunctiva/sclera: Conjunctivae normal.   Neck:      Thyroid: No thyromegaly.      Trachea: No tracheal deviation.   Cardiovascular:      Rate and Rhythm: Normal rate and regular rhythm.      Heart sounds: S1 normal and S2 normal.     No S3 or S4 sounds.   Pulmonary:      Effort: Pulmonary effort is normal. No respiratory distress.      Breath sounds: Normal breath sounds. No wheezing or rales.   Abdominal:      Palpations: Abdomen is soft.      Tenderness: There is no abdominal tenderness.   Genitourinary:     Penis: Lesions present.       Comments: Erythema and small lesion noted on head of penis.   Musculoskeletal:         General: Normal range of motion.      Cervical back: Neck supple.   Lymphadenopathy:      Cervical: No cervical adenopathy.   Skin:     General: Skin is warm and dry.      Findings: Lesion present. No rash.      Comments: Left wrist notable for actinic keratosis lesion (light red and scaly patch) approximately the size of a sunflower seed.    Neurological:      Mental Status: He is alert and oriented to person, place, and time.      Motor: No abnormal muscle tone.      Deep Tendon Reflexes: Reflexes are normal and symmetric.   Psychiatric:         Speech: Speech normal.         Behavior: Behavior normal.         Thought Content: Thought content normal.         Judgment: Judgment normal.       Diagnostic Test Results:  Labs reviewed in Epic  See orders pending in Epic     Procedure Note:  Pause for the cause has been completed prior to the prceedure.   1. Patient was identified by both name and date of birth   2. The correct site was identified   3. Verbal authorization  to proceed was obtained   4. Verifed necessary supplies, equipment, and diagnostics are available    5. Time out was performed immediately prior to procedure    Objective: The lesion(s) is/are of the above mentioned size and location and is/are typical. Using the usual technique, cryotherapy with liquid nitrogen x 3 was  performed. The procedure was well tolerated and without complications.      ASSESSMENT / PLAN:       ICD-10-CM    1. Encounter for Medicare annual wellness exam  Z00.00       2. History of alcohol abuse  F10.11 Hepatic panel (Albumin, ALT, AST, Bili, Alk Phos, TP)      3. Urinary frequency  R35.0 UA Macro with Reflex to Micro and Culture - lab collect     UA Macro with Reflex to Micro and Culture - lab collect     Urine Microscopic      4. Tinea of groin  B35.6 nystatin (MYCOSTATIN) [id number] UNIT/GM external cream      5. Actinic keratosis  L57.0 DESTRUCT PREMALIGNANT LESION, FIRST        1-2. Patient is doing well. Encouraged to continue following up annually. Will obtain labs and UA today. Urinary frequency is not new to patient but had recent UTI which did not clear with initial treatment. He is worried and would like this repeated. Will assess for any current infections and follow up as results become available.   - Due to history of low hemoglobin and ETOH abuse, would like a full panel of labs done   - Await results     4. Groin lesion most consistent with tinea. Will prescribe nystatin cream to apply twice daily until clears, and then 1 more week for treatment. Reviewed medication use and side effects. Instructed to follow up if symptoms worsen or do not improve in 2 weeks.     5. Skin lesion of left wrist most consistent with actinic keratosis. Advised on need for cryotherapy including benefits and risks of treatments. Patient verbally consented to safety. Performed cryotherapy today for treatment as above. Instructed that it may blister or just scab over and heal. Patient to follow up if there are any persistent concerns. Advised on after care        Patient has been advised of split billing requirements and indicates understanding: Yes      COUNSELING:  Reviewed preventive health counseling, as reflected in patient instructions  Special attention given to:       Regular exercise       Healthy  "diet/nutrition       Immunizations    Declined: covid due to Other feels he has had enough         Estimated body mass index is 26.67 kg/m  as calculated from the following:    Height as of 10/14/22: 5' 5.55\" (1.665 m).    Weight as of 10/14/22: 163 lb (73.9 kg).    Weight management plan: Discussed healthy diet and exercise guidelines    He reports that he has quit smoking. His smoking use included cigarettes. He smoked an average of .5 packs per day. He has never used smokeless tobacco.      Appropriate preventive services were discussed with this patient, including applicable screening as appropriate for cardiovascular disease, diabetes, osteopenia/osteoporosis, and glaucoma.  As appropriate for age/gender, discussed screening for colorectal cancer, prostate cancer, breast cancer, and cervical cancer. Checklist reviewing preventive services available has been given to the patient.    Reviewed patients plan of care and provided an AVS. The Basic Care Plan (routine screening as documented in Health Maintenance) for Luther meets the Care Plan requirement. This Care Plan has been established and reviewed with the Patient.    Counseling Resources:  ATP IV Guidelines  Pooled Cohorts Equation Calculator  Breast Cancer Risk Calculator  Breast Cancer: Medication to Reduce Risk  FRAX Risk Assessment  ICSI Preventive Guidelines  Dietary Guidelines for Americans, 2010  USDA's MyPlate  ASA Prophylaxis  Lung CA Screening    Review of the result(s) of each unique test - See list       Care Everywhere 10/29/22 - UA       8/17/22 and 8/7/22 - all labs   Diagnosis or treatment significantly limited by social determinants of health - None     35 minutes spent on the date of the encounter doing chart review, history and exam, documentation and further activities as noted above    The patient indicates understanding of these issues and agrees with the plan.    Follow up; 1 year and PRN     I, Ruddy Hickey PA-C,  was present " with the PA student who participated in the service and in the documentation of the note.  I have verified the history and personally performed the physical exam and medical decision making.  I agree with the assessment and plan of care as documented in the note.     Juanita Mar, PA-S2  [facility]-C  [facility]    Identified Health Risks:      The patient was provided with suggestions to help him develop a healthy physical lifestyle.  He is at risk for lack of exercise and has been provided with information to increase physical activity for the benefit of his well-being.  The patient reports that he has difficulty with activities of daily living. I have asked that the patient make a follow up appointment in 53 weeks where this issue will be further evaluated and addressed.  The patient was provided with written information regarding signs of hearing loss.  Information on urinary incontinence and treatment options given to patient.  The patient was provided with suggestions to help him develop a healthy emotional lifestyle.

## 2022-11-08 NOTE — PROGRESS NOTES
The patient was provided with suggestions to help him develop a healthy physical lifestyle.  He is at risk for lack of exercise and has been provided with information to increase physical activity for the benefit of his well-being.  The patient reports that he has difficulty with activities of daily living. I have asked that the patient make a follow up appointment in *** weeks where this issue will be further evaluated and addressed.  The patient was provided with written information regarding signs of hearing loss.  Information on urinary incontinence and treatment options given to patient.  The patient was provided with suggestions to help him develop a healthy emotional lifestyle.

## 2022-11-08 NOTE — RESULT ENCOUNTER NOTE
Please place copy of labs and letter from me stating all labs are normal and urine shows no signs of infection. Patient will  on 11/9/22.     Ruddy Hickey PA-C

## 2022-11-08 NOTE — LETTER
November 9, 2022      Luther [place] [facility] [address]        Dear ,    We are writing to inform you of your test results. All labs are normal and urine shows no signs of infection      Resulted Orders   Basic metabolic panel  (Ca, Cl, CO2, Creat, Gluc, K, Na, BUN)   Result Value Ref Range    Sodium 134 133 - 144 mmol/L    Potassium 4.3 3.4 - 5.3 mmol/L    Chloride 101 94 - 109 mmol/L    Carbon Dioxide (CO2) 28 20 - 32 mmol/L    Anion Gap 5 3 - 14 mmol/L    Urea Nitrogen 17 7 - 30 mg/dL    Creatinine 0.64 (L) 0.66 - 1.25 mg/dL    Calcium 9.2 8.5 - 10.1 mg/dL    Glucose 90 70 - 99 mg/dL    GFR Estimate >90 >60 mL/min/1.73m2      Comment:      Effective December 21, 2021 eGFRcr in adults is calculated using the 2021 CKD-EPI creatinine equation which includes age and gender (Binh et al., [place], DOI: 10.1056/OWXOsf5598450)   Lipid panel reflex to direct LDL Fasting   Result Value Ref Range    Cholesterol 146 <200 mg/dL    Triglycerides 37 <150 mg/dL    Direct Measure HDL 92 >=40 mg/dL    LDL Cholesterol Calculated 47 <=100 mg/dL    Non HDL Cholesterol 54 <130 mg/dL    Narrative    Cholesterol  Desirable:  <200 mg/dL    Triglycerides  Normal:  Less than 150 mg/dL  Borderline High:  150-199 mg/dL  High:  200-499 mg/dL  Very High:  Greater than or equal to 500 mg/dL    Direct Measure HDL  Female:  Greater than or equal to 50 mg/dL   Male:  Greater than or equal to 40 mg/dL    LDL Cholesterol  Desirable:  <100mg/dL  Above Desirable:  100-129 mg/dL   Borderline High:  130-159 mg/dL   High:  160-189 mg/dL   Very High:  >= 190 mg/dL    Non HDL Cholesterol  Desirable:  130 mg/dL  Above Desirable:  130-159 mg/dL  Borderline High:  160-189 mg/dL  High:  190-219 mg/dL  Very High:  Greater than or equal to 220 mg/dL   Hepatic panel (Albumin, ALT, AST, Bili, Alk Phos, TP)   Result Value Ref Range    Bilirubin Total 0.5 0.2 - 1.3 mg/dL    Bilirubin Direct 0.2 0.0 - 0.2 mg/dL    Protein Total 7.2  6.8 - 8.8 g/dL    Albumin 3.7 3.4 - 5.0 g/dL    Alkaline Phosphatase 60 40 - 150 U/L    AST 18 0 - 45 U/L    ALT 26 0 - 70 U/L       If you have any questions or concerns, please call the clinic at the number listed above.       Sincerely,      Sarah Hickey PA-C

## 2022-11-09 ENCOUNTER — TELEPHONE (OUTPATIENT)
Dept: FAMILY MEDICINE | Facility: OTHER | Age: 87
End: 2022-11-09

## 2022-11-09 NOTE — TELEPHONE ENCOUNTER
Reason for Call:  Appointment Request    Patient requesting this type of appt:  Work in    Requested provider: Sarah Hickey    Reason patient unable to be scheduled: Not within requested timeframe    When does patient want to be seen/preferred time: 1-2 days    Comments: retreat spot on arm, yesterday didn't do anything.  Patient will stop back on Thursday to see if he can get worked in.  He states he can't hear on phone     Okay to leave a detailed message?: No at     Call taken on 11/9/2022 at 9:09 AM by Neda Hubbard

## 2022-12-01 ENCOUNTER — TELEPHONE (OUTPATIENT)
Dept: FAMILY MEDICINE | Facility: OTHER | Age: 87
End: 2022-12-01

## 2022-12-01 DIAGNOSIS — M54.50 CHRONIC BILATERAL LOW BACK PAIN WITHOUT SCIATICA: ICD-10-CM

## 2022-12-01 DIAGNOSIS — G89.29 CHRONIC BILATERAL LOW BACK PAIN WITHOUT SCIATICA: ICD-10-CM

## 2022-12-01 RX ORDER — HYDROCODONE BITARTRATE AND ACETAMINOPHEN 5; 325 MG/1; MG/1
1-2 TABLET ORAL EVERY 8 HOURS PRN
Qty: 90 TABLET | Refills: 0 | Status: SHIPPED | OUTPATIENT
Start: 2022-12-01 | End: 2023-01-13

## 2022-12-01 NOTE — TELEPHONE ENCOUNTER
Medication Question or Refill        What medication are you calling about (include dose and sig)?: HYDROcodone-acetaminophen (NORCO) 5-325 MG tablet    Controlled Substance Agreement on file:   CSA -- Patient Level:     [Media Unavailable] Controlled Substance Agreement - Opioid - Scan on 4/18/2021  4:19 PM   [Media Unavailable] Controlled Substance Agreement - Opioid - Scan on 8/5/2019  3:04 PM: Opiod/Opiod Plus Controlled Substance Agreement 08/05/19       Who prescribed the medication?: Sarah Lynch    Do you need a refill? Yes:     When did you use the medication last? ?    Patient offered an appointment? No    Do you have any questions or concerns?  No    Preferred Pharmacy:   [facility] [place] [address] [place] [id number]  Phone: [phone] Fax: [phone]          Okay to leave a detailed message?: No at Home number on file [phone] ([place])

## 2022-12-01 NOTE — TELEPHONE ENCOUNTER
Routing refill request to provider for review/approval because:  Drug not on the [facility] refill protocol     Also controlled substance agreement  22    Jessi Mead RN  [facility] ~ Registered Nurse  Clinic Triage ~ [place] [facility] [place]  2022

## 2022-12-01 NOTE — TELEPHONE ENCOUNTER
reviewed. Due for routine fill. E-prescribed.    Ruddy Hickey PA-C  [facility]

## 2023-01-13 DIAGNOSIS — M54.50 CHRONIC BILATERAL LOW BACK PAIN WITHOUT SCIATICA: ICD-10-CM

## 2023-01-13 DIAGNOSIS — G89.29 CHRONIC BILATERAL LOW BACK PAIN WITHOUT SCIATICA: ICD-10-CM

## 2023-01-13 RX ORDER — HYDROCODONE BITARTRATE AND ACETAMINOPHEN 5; 325 MG/1; MG/1
1-2 TABLET ORAL EVERY 8 HOURS PRN
Qty: 90 TABLET | Refills: 0 | Status: SHIPPED | OUTPATIENT
Start: 2023-01-13 | End: 2023-03-01

## 2023-01-13 NOTE — TELEPHONE ENCOUNTER
Routing refill request to provider for review/approval because:  Drug not on the [facility] refill protocol     Jessi Mead RN  [facility] ~ Registered Nurse  Clinic Triage ~ [place] [facility] [place]  January 13, 2023

## 2023-01-13 NOTE — TELEPHONE ENCOUNTER
Medication Question or Refill        What medication are you calling about (include dose and sig)?:  HYDROcodone-acetaminophen (NORCO)     Controlled Substance Agreement on file:   CSA -- Patient Level:     [Media Unavailable] Controlled Substance Agreement - Opioid - Scan on 4/18/2021  4:19 PM   [Media Unavailable] Controlled Substance Agreement - Opioid - Scan on 8/5/2019  3:04 PM: Opiod/Opiod Plus Controlled Substance Agreement 08/05/19       Who prescribed the medication?:  Sarah Hickey    Do you need a refill? Yes:     When did you use the medication last?  01-13-23    Patient offered an appointment? No    Do you have any questions or concerns?  No    Preferred Pharmacy:   [facility] [place] [address] [place] [id number]  Phone: [phone] Fax: [phone]        Okay to leave a detailed message?: No .   Please do not call patient.

## 2023-01-13 NOTE — TELEPHONE ENCOUNTER
reviewed. Due for routine fill. E-prescribed.    Ruddy Hickey PA-C  [facility]

## 2023-03-01 ENCOUNTER — TELEPHONE (OUTPATIENT)
Dept: FAMILY MEDICINE | Facility: OTHER | Age: 88
End: 2023-03-01

## 2023-03-01 ENCOUNTER — ALLIED HEALTH/NURSE VISIT (OUTPATIENT)
Dept: FAMILY MEDICINE | Facility: OTHER | Age: 88
End: 2023-03-01
Payer: MEDICARE

## 2023-03-01 DIAGNOSIS — Z53.9 DIAGNOSIS FOR ++++ WALK IN CLINIC ++++: Primary | ICD-10-CM

## 2023-03-01 DIAGNOSIS — M54.50 CHRONIC BILATERAL LOW BACK PAIN WITHOUT SCIATICA: ICD-10-CM

## 2023-03-01 DIAGNOSIS — G89.29 CHRONIC BILATERAL LOW BACK PAIN WITHOUT SCIATICA: ICD-10-CM

## 2023-03-01 RX ORDER — HYDROCODONE BITARTRATE AND ACETAMINOPHEN 5; 325 MG/1; MG/1
1-2 TABLET ORAL EVERY 8 HOURS PRN
Qty: 90 TABLET | Refills: 0 | Status: SHIPPED | OUTPATIENT
Start: 2023-03-01 | End: 2023-04-24

## 2023-03-01 NOTE — PROGRESS NOTES
"Patient arrived at clinic with his 11/8/22 labwork in hand.  He had some questions.  Declined being taken to a room; states has nothing to say that can't be said in waiting room.  I was able to get him to sit in the scheduling \"cubby\" area.  I had printed out the 11/8/22 labs and did review them in detail with patient.  Wanted to know which results were his kidney functions and what his lipid panel meant.  I did review all these in detail and patient states understanding.  He was given my more detailed and easier to read copies of labs.  I then walked with him to his car because it was snowing heavily.  He walks hunched over with cane.  Got into car safely with standby assistance only.  Mariama CELESTE RN  "

## 2023-03-01 NOTE — TELEPHONE ENCOUNTER
Medication Question or Refill        What medication are you calling about (include dose and sig)?: HYDROcodone-acetaminophen (NORCO) 5-325 MG tablet    Preferred Pharmacy:   [facility] - [address] [address]  Phone: [phone] Fax: [phone]        Controlled Substance Agreement on file:   CSA -- Patient Level:     [Media Unavailable] Controlled Substance Agreement - Opioid - Scan on 4/18/2021  4:19 PM   [Media Unavailable] Controlled Substance Agreement - Opioid - Scan on 8/5/2019  3:04 PM: Opiod/Opiod Plus Controlled Substance Agreement 08/05/19       Who prescribed the medication?: Angely Lynch    Do you need a refill? Yes    When did you use the medication last? 02/28/23    Patient offered an appointment? No    Do you have any questions or concerns?  No      Okay to leave a detailed message?: No at Home number on file [phone] ([place])

## 2023-03-15 ENCOUNTER — TELEPHONE (OUTPATIENT)
Dept: FAMILY MEDICINE | Facility: OTHER | Age: 88
End: 2023-03-15
Payer: MEDICARE

## 2023-03-15 ENCOUNTER — NURSE TRIAGE (OUTPATIENT)
Dept: FAMILY MEDICINE | Facility: OTHER | Age: 88
End: 2023-03-15
Payer: MEDICARE

## 2023-03-15 NOTE — TELEPHONE ENCOUNTER
Unable to leave message due to voice mail full.  Needs to be triaged for rash on back.    Please transfer to triage with call back.    Jessi Mead RN  [facility] ~ Registered Nurse  Clinic Triage ~ [place] [facility]  March 15, 2023'e

## 2023-03-15 NOTE — TELEPHONE ENCOUNTER
Rash on belly and chest and some on back on both sides more towards upper back.  Going on for at least 3-4 weeks.  Pain located in chest area.  Dull achy pain.  Pain is intermittent.  Rash red     Rash spots size of dime and sometimes they itch.  Is using itch cream like gold bond.      No bleeding or any drainage coming from rash.      No fevers.  Rash not getting any worse but isn't getting better.    No fevers.      SEE PCP WITHIN 3 DAYS:  * You need to be seen within 2 or 3 days.  * PCP VISIT: Call your doctor (or NP/PA) during regular office hours and make an appointment. A clinic or urgent care center are good places to go for care if your doctor's office is closed or you can't get an appointment. NOTE: If office will be open tomorrow, tell caller to call then, not in 3 days.    Are we able to use your approval req spot tomorrow at 11:3o for this?    Jessi Mead RN  [facility] ~ Registered Nurse  Clinic Triage ~ [place] [facility] [place]  March 15, 2023      Reason for Disposition    Localized rash present > 7 days    Protocols used: RASH OR REDNESS - [id number]

## 2023-03-15 NOTE — TELEPHONE ENCOUNTER
Additional Information    Negative: [1] Sudden onset of rash (within last 2 hours) AND [2] difficulty breathing or swallowing    Negative: Sounds like a life-threatening emergency to the triager    Negative: Ringworm suspected (i.e., round pink patch, sometimes looks like ring, usually 1/2 to 1 inch [12-25 mm],  in size, slowly increasing in size)    Protocols used: RASH OR REDNESS - [id number]

## 2023-03-15 NOTE — TELEPHONE ENCOUNTER
Patient is wondering if he could get in with Sarah or anyone sooner than first available. He has a rash on his back and is not sure if it is shingles or not. Patient is hard of hearing and does not have voicemail. If no answer, please try again or he will call back when he sees that someone has called.

## 2023-03-15 NOTE — TELEPHONE ENCOUNTER
Scheduled visit with CDL for tomorrow here in [place].    Jessi Mead RN  [facility] ~ Registered Nurse  Clinic Triage ~ [place] [facility] [place]  March 15, 2023

## 2023-03-16 ENCOUNTER — OFFICE VISIT (OUTPATIENT)
Dept: FAMILY MEDICINE | Facility: OTHER | Age: 88
End: 2023-03-16
Payer: MEDICARE

## 2023-03-16 VITALS
DIASTOLIC BLOOD PRESSURE: 80 MMHG | RESPIRATION RATE: 16 BRPM | BODY MASS INDEX: 25.16 KG/M2 | WEIGHT: 151 LBS | SYSTOLIC BLOOD PRESSURE: 132 MMHG | HEART RATE: 86 BPM | HEIGHT: 65 IN | TEMPERATURE: 97.4 F

## 2023-03-16 DIAGNOSIS — L30.9 DERMATITIS: Primary | ICD-10-CM

## 2023-03-16 PROCEDURE — 99213 OFFICE O/P EST LOW 20 MIN: CPT | Performed by: PHYSICIAN ASSISTANT

## 2023-03-16 RX ORDER — TRIAMCINOLONE ACETONIDE 1 MG/G
CREAM TOPICAL 2 TIMES DAILY
Qty: 45 G | Refills: 2 | Status: SHIPPED | OUTPATIENT
Start: 2023-03-16 | End: 2023-10-26

## 2023-03-16 ASSESSMENT — PAIN SCALES - GENERAL: PAINLEVEL: NO PAIN (0)

## 2023-03-16 NOTE — PROGRESS NOTES
Assessment & Plan     ICD-10-CM    1. Dermatitis  L30.9 triamcinolone (KENALOG) 0.1 % external cream        1Tracy Sloan an 88 y.o male has been experiencing erythematous lesions across the anterior aspect of his lower thoracic region and middle of his posterior thoracic region. These lesions are pruritic and have some burning sensation with them. From HPI and physical exam, lesions are most consistent with dermatitis. He is prescribed with a topical triamcinolone cream to be used twice a day for 2 weeks.     He is educated on the side effects on triamcinolone and is not to over use it as it can cause skin irritation and thinning.    He has an appointment with his dermatologist on 03/29/23 and he is advised to follow-up to ensure resolution of the rash.      Review of the result(s) of each unique test - None   Diagnosis or treatment significantly limited by social determinants of health - None    20 minutes spent on the date of the encounter doing chart review, history and exam, documentation and further activities as noted above    The patient indicates understanding of these issues and agrees with the plan.    I, Ruddy Hickey PA-C,  was present with the PA student who participated in the service and in the documentation of the note.  I have verified the history and personally performed the physical exam and medical decision making.  I agree with the assessment and plan of care as documented in the note.     ADAMA HanS   [facility] ORLANDO Hickey PA-C  [facility]L [facility] is a 88 year old, presenting for the following health issues:  Cole Sloan is 88 y.o male presents with the complaint of a rash that has been present for about a month now. The rash is located across his lower chest and middle of his back in the thoracic region.  He states that he has some intermittent pruritus and burning sensation. He denies any fever, fatigue,  "paresthesias, any changes in lotion, detergent, soaps or contact with any known allergens. He has tried some OTC lotions but not alleviated the symptoms.    History of Present Illness       Reason for visit:  Rash  Symptom onset:  More than a month  Symptom intensity:  Mild  Symptom progression:  Staying the same    He eats 2-3 servings of fruits and vegetables daily.He consumes 0 sweetened beverage(s) daily.He exercises with enough effort to increase his heart rate 9 or less minutes per day.  He exercises with enough effort to increase his heart rate 3 or less days per week.   He is taking medications regularly.         Rash  Onset/Duration:several weeks - about 1 month or so   Description  Location: tummy and back   Character: round, painful  Itching: moderate  Intensity:  severe  Progression of Symptoms:  worsening  Accompanying signs and symptoms:   Fever: No  Body aches or joint pain: No  Sore throat symptoms: No  Recent cold symptoms: No  History:           Previous episodes of similar rash: None  New exposures:  None  Recent travel: No  Exposure to similar rash: No  Therapies tried and outcome: Jonathan cuellar       - Dermatologist on the 29th   - Got some spots sprayed on his arm from derm   - Penis cleared up but took awhile             Review of Systems   All other systems reviewed and are negative.     Constitutional, HEENT, cardiovascular, pulmonary, gi and gu systems are negative, except as otherwise noted.      Objective    /80   Pulse 86   Temp 97.4  F (36.3  C) (Temporal)   Resp 16   Ht 1.651 m (5' 5\")   Wt 68.5 kg (151 lb)   BMI 25.13 kg/m    Body mass index is 25.13 kg/m .     Physical Exam  Constitutional:       General: He is not in acute distress.     Appearance: He is not toxic-appearing.   HENT:      Right Ear: Tympanic membrane normal.      Left Ear: Tympanic membrane normal.      Nose: Nose normal. No congestion.      Mouth/Throat:      Mouth: Mucous membranes are moist.   Eyes:      " Conjunctiva/sclera: Conjunctivae normal.   Cardiovascular:      Rate and Rhythm: Normal rate and regular rhythm.      Pulses: Normal pulses.      Heart sounds: Normal heart sounds.   Pulmonary:      Effort: Pulmonary effort is normal.      Breath sounds: Normal breath sounds.   Abdominal:      General: Bowel sounds are normal.      Palpations: Abdomen is soft.   Lymphadenopathy:      Cervical: No cervical adenopathy.   Skin:     General: Skin is warm.      Findings: Rash present.             Comments: Lesions have no blisters, oozing or crusting   Neurological:      Mental Status: He is alert and oriented to person, place, and time.      Sensory: No sensory deficit.   Psychiatric:         Mood and Affect: Mood normal.         Behavior: Behavior normal.            Diagnostics: none

## 2023-04-24 DIAGNOSIS — G89.29 CHRONIC BILATERAL LOW BACK PAIN WITHOUT SCIATICA: ICD-10-CM

## 2023-04-24 DIAGNOSIS — M54.50 CHRONIC BILATERAL LOW BACK PAIN WITHOUT SCIATICA: ICD-10-CM

## 2023-04-24 RX ORDER — HYDROCODONE BITARTRATE AND ACETAMINOPHEN 5; 325 MG/1; MG/1
1-2 TABLET ORAL EVERY 8 HOURS PRN
Qty: 90 TABLET | Refills: 0 | Status: SHIPPED | OUTPATIENT
Start: 2023-04-24 | End: 2023-06-19

## 2023-04-24 NOTE — TELEPHONE ENCOUNTER
Attempted to reach Luther today to let him know medication refill approved. No answer. Unable to leave message. Mailbox full.

## 2023-04-24 NOTE — TELEPHONE ENCOUNTER
reviewed. Due for routine fill. E-prescribed    Ruddy Hickey PA-C  [facility]

## 2023-04-24 NOTE — TELEPHONE ENCOUNTER
Routing refill request to provider for review/approval because:  Drug not on the [facility] refill protocol       MITESH Sequeira, RN  [facility] ~ Registered Nurse  Clinic Triage ~ [place] [facility] [employer]  April 24, 2023

## 2023-04-24 NOTE — TELEPHONE ENCOUNTER
Medication Question or Refill        What medication are you calling about (include dose and sig)?:  HYDROcodone-acetaminophen (NORCO) 5-325 MG tablet    Preferred Pharmacy:       [id number] - [place], [place] - [address] AT [facility] OF  & [address]  [address]  [facility] [address]  Phone: [phone] Fax: [phone]      Controlled Substance Agreement on file:   CSA -- Patient Level:     [Media Unavailable] Controlled Substance Agreement - Opioid - Scan on 4/18/2021  4:19 PM   [Media Unavailable] Controlled Substance Agreement - Opioid - Scan on 8/5/2019  3:04 PM: Opiod/Opiod Plus Controlled Substance Agreement 08/05/19       Who prescribed the medication?: Sarah Hickey    Do you need a refill? Yes    When did you use the medication last?  unknown    Patient offered an appointment? No    Do you have any questions or concerns?  No      Okay to leave a detailed message?: No , patient does not want a phone call

## 2023-06-19 ENCOUNTER — TELEPHONE (OUTPATIENT)
Dept: FAMILY MEDICINE | Facility: OTHER | Age: 88
End: 2023-06-19
Payer: MEDICARE

## 2023-06-19 DIAGNOSIS — G89.29 CHRONIC BILATERAL LOW BACK PAIN WITHOUT SCIATICA: ICD-10-CM

## 2023-06-19 DIAGNOSIS — M54.50 CHRONIC BILATERAL LOW BACK PAIN WITHOUT SCIATICA: ICD-10-CM

## 2023-06-19 RX ORDER — HYDROCODONE BITARTRATE AND ACETAMINOPHEN 5; 325 MG/1; MG/1
1-2 TABLET ORAL EVERY 8 HOURS PRN
Qty: 90 TABLET | Refills: 0 | Status: SHIPPED | OUTPATIENT
Start: 2023-06-19 | End: 2023-07-31

## 2023-06-19 RX ORDER — HYDROCODONE BITARTRATE AND ACETAMINOPHEN 5; 325 MG/1; MG/1
1-2 TABLET ORAL EVERY 8 HOURS PRN
Qty: 90 TABLET | Refills: 0 | Status: CANCELLED | OUTPATIENT
Start: 2023-06-19

## 2023-06-19 NOTE — TELEPHONE ENCOUNTER
Medication Question or Refill        What medication are you calling about (include dose and sig)?: Pain medications    Preferred Pharmacy:       [id number] - [place], [place] - [address] AT [facility] OF [facility] [address]  [address]   [place] [facility] [address]  Phone: [phone] Fax: [phone]      Controlled Substance Agreement on file:   CSA -- Patient Level:     [Media Unavailable] Controlled Substance Agreement - Opioid - Scan on 4/18/2021  4:19 PM   [Media Unavailable] Controlled Substance Agreement - Opioid - Scan on 8/5/2019  3:04 PM: Opiod/Opiod Plus Controlled Substance Agreement 08/05/19       Who prescribed the medication?: patient stated pain medication    Do you need a refill? Yes            Okay to leave a detailed message?: No at Home number on file [phone] (home) Patient does not want a phone call when refilled. He is hard of hearing.

## 2023-06-19 NOTE — TELEPHONE ENCOUNTER
Appointments in Next Year    Jul 31, 2023  2:30 PM  (Arrive by 2:10 PM)  Provider Visit with Sarah Hickey PA-C  [facility] ([facility] ) [phone]        RX sent, due for follow up with PCP which is scheduled.    Reviewed       BRANDY Gambino CNP  Questions or concerns please feel free to send me a [place] message or call me  Phone : [phone]

## 2023-06-19 NOTE — TELEPHONE ENCOUNTER
Pending Prescriptions:                       Disp   Refills    HYDROcodone-acetaminophen (NORCO) 5-325 MG*90 tab*0        Sig: Take 1-2 tablets by mouth every 8 hours as needed for           moderate to severe pain (Max 4 per day)    Routing refill request to provider for review/approval because:  Drug not on the [facility] refill protocol     PHQ-9 score:        4/7/2021    10:42 AM   PHQ   PHQ-9 Total Score 4   Q9: Thoughts of better off dead/self-harm past 2 weeks Not at all       Requested Prescriptions   Pending Prescriptions Disp Refills    HYDROcodone-acetaminophen (NORCO) 5-325 MG tablet 90 tablet 0     Sig: Take 1-2 tablets by mouth every 8 hours as needed for moderate to severe pain (Max 4 per day)       There is no refill protocol information for this order             Paola Larose RN on 6/19/2023 at 2:59 PM

## 2023-06-19 NOTE — TELEPHONE ENCOUNTER
Patient stopped in to clinic upset about medication refills. States he is in pain and cannot wait. Currently in the lobby.

## 2023-06-19 NOTE — TELEPHONE ENCOUNTER
Routing refill request to provider for review/approval because:  Drug not on the [facility] refill protocol     Patient has been to the clinic twice today looking for this medication.  He is upset and angry because CDL is out and no one is helping him.    Jessi Mead RN  [facility] ~ Registered Nurse  Clinic Triage ~ [place] [facility] [employer]  June 19, 2023

## 2023-07-11 ENCOUNTER — TRANSFERRED RECORDS (OUTPATIENT)
Dept: HEALTH INFORMATION MANAGEMENT | Facility: CLINIC | Age: 88
End: 2023-07-11
Payer: MEDICARE

## 2023-07-28 NOTE — LETTER
10/26/2017         RE: Luther Kam  [address] [facility] [address]        Dear Colleague,    Thank you for referring your patient, Luther Kam, to the [facility]. Please see a copy of my visit note below.    [facility]  Neurosurgery followup:    HPI: Luther Kam is an 82 year old male who presents for a 14 week f/u of an acute/subacute T12 and L1 compression fractures. He has been wearing [place] brace. States his back pain has continued to slowly improve over the past couple of months and he is happy with how he is feeling. He has been taking Norco for pain prn, which is helpful when he has flare ups. Denies radicular pain or weakness.  Exam:  Constitutional:  Alert, well nourished, NAD.  HEENT: Normocephalic, atraumatic.   Pulm:  Without shortness of breath   CV:  No pitting edema of BLE.      Neurological:  Awake  Alert  Oriented x 3  Motor exam:        IP Q DF PF EHL  R   5  5   5   5    5  L   5  5   5   5    5     Reflexes are 2+ in the patellar and Achilles. There is no clonus. Downgoing Babinski.    Able to spontaneously move L/E bilaterally  Sensation intact throughout all L/E dermatomes     Imaging:   LUMBAR SPINE TWO TO THREE VIEWS October 26, 2017 8:42 AM      HISTORY: Wedge compression fracture of T11-T12 vertebra, subsequent encounter for fracture with routine healing. Wedge compression fracture of first lumbar vertebra, subsequent encounter for fracture with routine healing.     COMPARISON: Lumbar spine x-rays dated 9/7/2017.     FINDINGS: There are five non-rib bearing lumbar type vertebrae.  Spondylotic spurring is seen in the spine. Mild disc height loss is  seen at L3-L4 and L4-L5. This was also noted on the prior study. No  fracture or malalignment. Facet arthropathy is seen at L5-S1 and at  L4-L5, worse on the left. There are aortic atherosclerotic  calcifications. Pedicles are intact. Psoas margins are maintained. SI  joints demonstrate mild  Patient verified name and . Order for consent not found in EHR. Type 1B surgery, PAT phone assessment complete. Orders not received. Labs per surgeon: no orders received. Labs per anesthesia protocol: none. Pt has Pseudocholinesterase deficiency; will have anesthesia review chart. Patient answered medical/surgical history questions at their best of ability. All prior to admission medications documented in EPIC. Patient instructed to take the following medications the day of surgery according to anesthesia guidelines with a small sip of water: none. On the day before surgery please take Acetaminophen 1000mg in the morning and then again before bed. You may substitute for Tylenol 650 mg. Hold all vitamins 7 days prior to surgery and NSAIDS 5 days prior to surgery. Patient instructed on the following:    > Arrive at A Entrance, time of arrival to be called the day before by 1700  > NPO after midnight, unless otherwise indicated, including gum, mints, and ice chips  > Responsible adult must drive patient to the hospital, stay during surgery, and patient will need supervision 24 hours after anesthesia  > Use antibacterial soap in shower the night before surgery and on the morning of surgery  > All piercings must be removed prior to arrival.    > Leave all valuables (money and jewelry) at home but bring insurance card and ID on DOS.   > Do not wear make-up, nail polish, lotions, cologne, perfumes, powders, or oil on skin. Artificial nails are not permitted. degenerative changes but otherwise  unremarkable. There is a superior endplate compression fracture of T12 which is stable since the prior study.     IMPRESSION:  1. Stable T12 superior endplate compression fracture.  2. Multilevel degenerative disc and facet arthropathy as described  above are essentially stable since the prior x-rays.  3. No other changes.     A/P:  Luther Kam is an 82 year old male who presents for a 14 week f/u of an acute/subacute T12 and L1 compression fractures. Has been wearing brace since injury. Pain has slowly subsided. Images reviewed today reveal stable fractures. Advised patient he may d/c brace. Discussed that he may benefit from PT to work on strengthening low back. Patient states he wishes to hold off at this time, but will call if he wishes to move forward with this. He may follow up with us as needed. Patient voiced understanding and agreement.    - Call the clinic at [phone] for increased pain or any other questions and concerns.      Yuni Lancaster PA-C  [facility]  [address] [address]    Tel [phone]  Pager [phone]      Again, thank you for allowing me to participate in the care of your patient.        Sincerely,        Yuni Lancaster PA-C

## 2023-07-31 ENCOUNTER — OFFICE VISIT (OUTPATIENT)
Dept: FAMILY MEDICINE | Facility: OTHER | Age: 88
End: 2023-07-31
Payer: MEDICARE

## 2023-07-31 VITALS
DIASTOLIC BLOOD PRESSURE: 74 MMHG | HEART RATE: 82 BPM | WEIGHT: 153.5 LBS | HEIGHT: 66 IN | TEMPERATURE: 97.6 F | BODY MASS INDEX: 24.67 KG/M2 | OXYGEN SATURATION: 98 % | SYSTOLIC BLOOD PRESSURE: 110 MMHG | RESPIRATION RATE: 18 BRPM

## 2023-07-31 DIAGNOSIS — G89.29 CHRONIC BILATERAL LOW BACK PAIN WITHOUT SCIATICA: Primary | ICD-10-CM

## 2023-07-31 DIAGNOSIS — L57.0 ACTINIC KERATOSIS: ICD-10-CM

## 2023-07-31 DIAGNOSIS — M54.50 CHRONIC BILATERAL LOW BACK PAIN WITHOUT SCIATICA: Primary | ICD-10-CM

## 2023-07-31 PROCEDURE — 99214 OFFICE O/P EST MOD 30 MIN: CPT | Performed by: PHYSICIAN ASSISTANT

## 2023-07-31 RX ORDER — HYDROCODONE BITARTRATE AND ACETAMINOPHEN 5; 325 MG/1; MG/1
1-2 TABLET ORAL EVERY 8 HOURS PRN
Qty: 90 TABLET | Refills: 0 | Status: SHIPPED | OUTPATIENT
Start: 2023-07-31 | End: 2023-11-24

## 2023-07-31 ASSESSMENT — ANXIETY QUESTIONNAIRES
4. TROUBLE RELAXING: NOT AT ALL
7. FEELING AFRAID AS IF SOMETHING AWFUL MIGHT HAPPEN: NOT AT ALL
6. BECOMING EASILY ANNOYED OR IRRITABLE: SEVERAL DAYS
1. FEELING NERVOUS, ANXIOUS, OR ON EDGE: SEVERAL DAYS
GAD7 TOTAL SCORE: 4
7. FEELING AFRAID AS IF SOMETHING AWFUL MIGHT HAPPEN: NOT AT ALL
IF YOU CHECKED OFF ANY PROBLEMS ON THIS QUESTIONNAIRE, HOW DIFFICULT HAVE THESE PROBLEMS MADE IT FOR YOU TO DO YOUR WORK, TAKE CARE OF THINGS AT HOME, OR GET ALONG WITH OTHER PEOPLE: NOT DIFFICULT AT ALL
GAD7 TOTAL SCORE: 4
8. IF YOU CHECKED OFF ANY PROBLEMS, HOW DIFFICULT HAVE THESE MADE IT FOR YOU TO DO YOUR WORK, TAKE CARE OF THINGS AT HOME, OR GET ALONG WITH OTHER PEOPLE?: NOT DIFFICULT AT ALL
3. WORRYING TOO MUCH ABOUT DIFFERENT THINGS: SEVERAL DAYS
5. BEING SO RESTLESS THAT IT IS HARD TO SIT STILL: NOT AT ALL
2. NOT BEING ABLE TO STOP OR CONTROL WORRYING: SEVERAL DAYS
GAD7 TOTAL SCORE: 4

## 2023-07-31 ASSESSMENT — PAIN SCALES - GENERAL: PAINLEVEL: NO PAIN (1)

## 2023-07-31 ASSESSMENT — PATIENT HEALTH QUESTIONNAIRE - PHQ9
10. IF YOU CHECKED OFF ANY PROBLEMS, HOW DIFFICULT HAVE THESE PROBLEMS MADE IT FOR YOU TO DO YOUR WORK, TAKE CARE OF THINGS AT HOME, OR GET ALONG WITH OTHER PEOPLE: NOT DIFFICULT AT ALL
SUM OF ALL RESPONSES TO PHQ QUESTIONS 1-9: 4
SUM OF ALL RESPONSES TO PHQ QUESTIONS 1-9: 4

## 2023-07-31 NOTE — PROGRESS NOTES
Assessment & Plan     ICD-10-CM    1. Chronic bilateral low back pain without sciatica  M54.50 HYDROcodone-acetaminophen (NORCO) 5-325 MG tablet    G89.29       2. Actinic keratosis  L57.0         Chronic back pain  - Stable, with PRN use of Norco   -  reviewed, no concerns   - #90 lasts about 2 months   - Reviewed use and side effects, refilled   - OK for refills as needed     2. AK   - Reviewed note patient brought from outside dermatology   - States that biopsy was AK  - Discussed etiology and treatment   - Checked back, looks like well healing, looks like shave biopsy was done   - Recommend continued monitoring, should resolve     Review of external notes as documented above    Diagnosis or treatment significantly limited by social determinants of health - None     20 minutes spent on the date of the encounter doing chart review, history and exam, documentation and further activities as noted above    The patient indicates understanding of these issues and agrees with the plan.    SANYA Montero [facility] [place] Sloan is a 88 year old, presenting for the following health issues:  Derm Problem (2nd opinion )      7/31/2023     2:31 PM   Additional Questions   Roomed by Vanita   Accompanied by self         7/31/2023     2:31 PM   Patient Reported Additional Medications   Patient reports taking the following new medications NA       History of Present Illness       Reason for visit:  Derm issue    He eats 4 or more servings of fruits and vegetables daily.He consumes 0 sweetened beverage(s) daily.He exercises with enough effort to increase his heart rate 9 or less minutes per day.  He exercises with enough effort to increase his heart rate 3 or less days per week.   He is taking medications regularly.     - Right scapula     - Size of pencil eraser on back      Blistered & got bigger   - Went to [facility]     Had biopsy, told Actinic Keratosis     -  "Refill of pain pills             Review of Systems   Constitutional, HEENT, cardiovascular, pulmonary, gi and gu systems are negative, except as otherwise noted.      Objective    /74   Pulse 82   Temp 97.6  F (36.4  C) (Temporal)   Resp 18   Ht 1.685 m (5' 6.34\")   Wt 69.6 kg (153 lb 8 oz)   SpO2 98%   BMI 24.52 kg/m    Body mass index is 24.52 kg/m .  Physical Exam   GENERAL APPEARANCE: healthy, alert and no distress  EYES: Eyes grossly normal to inspection, PERRLA, conjunctivae and sclerae without injection or discharge, EOM intact   MS: No musculoskeletal defects are noted and gait is age appropriate without ataxia   SKIN:   Right scapula - healing lesion with central scab, granulation tissue present    No suspicious lesions or rashes, hydration status appears adeuqate with normal skin turgor   PSYCH: Alert and oriented x3; speech- coherent , normal rate and volume; able to articulate logical thoughts, able to abstract reason, no tangential thoughts, no hallucinations or delusions, mentation appears normal, Mood is euthymic. Affect is appropriate for this mood state and bright. Thought content is free of suicidal ideation, hallucinations, and delusions. Dress is adequate and upkept. Eye contact is good during conversation.       Diagnostics: none   "

## 2023-09-28 ENCOUNTER — TELEPHONE (OUTPATIENT)
Dept: FAMILY MEDICINE | Facility: OTHER | Age: 88
End: 2023-09-28
Payer: MEDICARE

## 2023-09-28 RX ORDER — HYDROCODONE BITARTRATE AND ACETAMINOPHEN 5; 325 MG/1; MG/1
1-2 TABLET ORAL EVERY 8 HOURS PRN
Qty: 90 TABLET | Refills: 0 | Status: SHIPPED | OUTPATIENT
Start: 2023-09-28 | End: 2023-10-01

## 2023-09-28 NOTE — TELEPHONE ENCOUNTER
Pt came in today upset saying he needs his pain medication refilled and wanting to talk to Ruddy alford. I stated to him that she is not here and not in until Monday. He was not happy with that and want someone to fill his pain medication. He is hard of hearing so he doesn't want a call he just wants it sent over to the pharmacy.    Can anyone fill the pain medication? He has appointment in October with Ruddy!

## 2023-10-26 ENCOUNTER — OFFICE VISIT (OUTPATIENT)
Dept: FAMILY MEDICINE | Facility: OTHER | Age: 88
End: 2023-10-26
Payer: MEDICARE

## 2023-10-26 VITALS
HEART RATE: 76 BPM | HEIGHT: 65 IN | OXYGEN SATURATION: 99 % | SYSTOLIC BLOOD PRESSURE: 128 MMHG | WEIGHT: 145.5 LBS | DIASTOLIC BLOOD PRESSURE: 82 MMHG | BODY MASS INDEX: 24.24 KG/M2 | RESPIRATION RATE: 20 BRPM | TEMPERATURE: 97 F

## 2023-10-26 DIAGNOSIS — Z12.5 SCREENING FOR PROSTATE CANCER: ICD-10-CM

## 2023-10-26 DIAGNOSIS — Z13.220 SCREENING FOR LIPID DISORDERS: ICD-10-CM

## 2023-10-26 DIAGNOSIS — M54.50 CHRONIC BILATERAL LOW BACK PAIN WITHOUT SCIATICA: Primary | ICD-10-CM

## 2023-10-26 DIAGNOSIS — G89.29 CHRONIC BILATERAL LOW BACK PAIN WITHOUT SCIATICA: Primary | ICD-10-CM

## 2023-10-26 DIAGNOSIS — D64.9 LOW HEMOGLOBIN: ICD-10-CM

## 2023-10-26 DIAGNOSIS — F10.10 ALCOHOL ABUSE, EPISODIC DRINKING BEHAVIOR: ICD-10-CM

## 2023-10-26 DIAGNOSIS — G61.82 MULTIFOCAL MOTOR NEUROPATHY (H): ICD-10-CM

## 2023-10-26 DIAGNOSIS — F11.20 CONTINUOUS OPIOID DEPENDENCE (H): ICD-10-CM

## 2023-10-26 DIAGNOSIS — D22.5 MELANOCYTIC NEVI OF TRUNK: ICD-10-CM

## 2023-10-26 PROCEDURE — 17110 DESTRUCTION B9 LES UP TO 14: CPT | Performed by: PHYSICIAN ASSISTANT

## 2023-10-26 PROCEDURE — 99214 OFFICE O/P EST MOD 30 MIN: CPT | Mod: 25 | Performed by: PHYSICIAN ASSISTANT

## 2023-10-26 RX ORDER — RESPIRATORY SYNCYTIAL VIRUS VACCINE 120MCG/0.5
0.5 KIT INTRAMUSCULAR ONCE
Qty: 1 EACH | Refills: 0 | Status: CANCELLED | OUTPATIENT
Start: 2023-10-26 | End: 2023-10-26

## 2023-10-26 ASSESSMENT — ANXIETY QUESTIONNAIRES
5. BEING SO RESTLESS THAT IT IS HARD TO SIT STILL: NOT AT ALL
1. FEELING NERVOUS, ANXIOUS, OR ON EDGE: MORE THAN HALF THE DAYS
6. BECOMING EASILY ANNOYED OR IRRITABLE: SEVERAL DAYS
GAD7 TOTAL SCORE: 7
GAD7 TOTAL SCORE: 7
3. WORRYING TOO MUCH ABOUT DIFFERENT THINGS: SEVERAL DAYS
7. FEELING AFRAID AS IF SOMETHING AWFUL MIGHT HAPPEN: SEVERAL DAYS
2. NOT BEING ABLE TO STOP OR CONTROL WORRYING: SEVERAL DAYS
IF YOU CHECKED OFF ANY PROBLEMS ON THIS QUESTIONNAIRE, HOW DIFFICULT HAVE THESE PROBLEMS MADE IT FOR YOU TO DO YOUR WORK, TAKE CARE OF THINGS AT HOME, OR GET ALONG WITH OTHER PEOPLE: VERY DIFFICULT

## 2023-10-26 ASSESSMENT — PATIENT HEALTH QUESTIONNAIRE - PHQ9
SUM OF ALL RESPONSES TO PHQ QUESTIONS 1-9: 6
5. POOR APPETITE OR OVEREATING: SEVERAL DAYS

## 2023-10-26 ASSESSMENT — PAIN SCALES - GENERAL: PAINLEVEL: NO PAIN (0)

## 2023-10-26 NOTE — PROGRESS NOTES
Assessment & Plan     ICD-10-CM    1. Chronic bilateral low back pain without sciatica  M54.50 Drug Confirmation Panel Urine with Creat - lab collect    G89.29 Comprehensive metabolic panel (BMP + Alb, Alk Phos, ALT, AST, Total. Bili, TP)      2. F11.2 - Continuous opioid dependence (H)  F11.20 Drug Confirmation Panel Urine with Creat - lab collect     Comprehensive metabolic panel (BMP + Alb, Alk Phos, ALT, AST, Total. Bili, TP)      3. 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 Screening for lipid disorders  Z13.220 Lipid panel reflex to direct LDL Fasting      5. Alcohol abuse, episodic drinking behavior  F10.10 Comprehensive metabolic panel (BMP + Alb, Alk Phos, ALT, AST, Total. Bili, TP)      6. Screening for prostate cancer  Z12.5 PSA, screen          1 & 2. Chronic back pain   -- Stable on PRN use of Norco, only uses when needs, max usually 2 per day if having a bad day, some days doesn't take at all   - #90 per month given      Reviewed medication use and side effects including sedation, addiction, and constipation   -  reviewed, no concerns   - Discussed and signed CSA today   - Will get Utox as well     3. Neuropathy   - Likely from back and prior ETOH use   - Unchanged from previous  - Just wanted it documented     4. Skin lesion. Previously biopsied by outside dermatologist. Benign nevus. Slow to heal and opens frequently due to where it is on his back. Recommend cryotherapy to aid in healing. Reviewed benefits and risks of therapy. Verbal consent obtained. Procedure as below was well tolerated. Discussed after care     5-8.  Will get screening labs, he will make a fasting lab appointment     Encouraged him to get pneumonia and RSV vaccines at the pharmacy.     Review of the result(s) of each unique test - See list       11/8/22 - all labs        8/8/17 - PSA   25 minutes spent by me on the date of the encounter doing chart review, history and exam, documentation and further  activities per the note    Follow up: 3 months     SANYA Montero [facility] [place] Sloan is a 88 year old, presenting for the following health issues:  Follow Up (Lesion on back, neuropathy in feet)      10/26/2023     9:13 AM   Additional Questions   Roomed by Vanita   Accompanied by Self         10/26/2023     9:13 AM   Patient Reported Additional Medications   Patient reports taking the following new medications NA       HPI   Pain History:  When did you first notice your pain? Many years   Have you seen this provider for your pain in the past? Yes   Where in your body do you have pain? Back   Are you seeing anyone else for your pain? No      Chronic Pain Follow Up:    Location of pain: Low back  Analgesia/pain control:    - Recent changes:  Staying the same    - Overall control: Comfortably manageable    - Current treatments: Rx medication only  Adherence:     - Do you ever take more pain medicine than prescribed? No    - When did you take your last dose of pain medicine?  6:30pm, 10/25/23   Adverse effects: No     - Neuropathy in feet ongoing since back issues   - Doing ok with it though     The patient is an 88-year-old male who presents for evaluation of multiple medical concerns.    He has numbness in his feet. He still has some feeling in his feet. His house is safe. He does not have any rugs in his house. He does not trip. He does not have grab bars in his apartment. He has not had a drink in many years, but he abused a little bit.     He has a lesion on his back.Yesterday, he felt a layer of skin was still loose and it is not healing properly. It has been a long time since he had a biopsy.    He wants to get his cholesterol checked. He would like to get his liver panel and prostate checked.      PDMP Review         Value Time User    State PDMP site checked  Yes 9/28/2023 12:34 PM Abimbola Matthew MD          Last CSA Agreement:   CSA -- Patient  "Level:     [Media Unavailable] Controlled Substance Agreement - Opioid - Scan on 4/18/2021  4:19 PM   [Media Unavailable] Controlled Substance Agreement - Opioid - Scan on 8/5/2019  3:04 PM: Opiod/Opiod Plus Controlled Substance Agreement 08/05/19       Last UDS: 4/9/2021        Review of Systems   Constitutional, HEENT, cardiovascular, pulmonary, gi and gu systems are negative, except as otherwise noted.      Objective    /82   Pulse 76   Temp 97  F (36.1  C) (Temporal)   Resp 20   Ht 1.657 m (5' 5.24\")   Wt 66 kg (145 lb 8 oz)   SpO2 99%   BMI 24.04 kg/m    Body mass index is 24.04 kg/m .  Physical Exam   GENERAL APPEARANCE: healthy, alert and no distress  EYES: Eyes grossly normal to inspection, PERRLA, conjunctivae and sclerae without injection or discharge, EOM intact   RESP: Lungs clear to auscultation - no rales, rhonchi or wheezes    CV: Regular rates and rhythm, normal S1 S2, no S3 or S4, no murmur, click or rub, no peripheral edema and peripheral pulses strong and symmetric bilaterally   MS: Significant back curve forward, walks slowly with cane   SKIN:   Central back - skin lesion with open part and healing granulation tissue, dry around it, ~1 cm in diameter   No suspicious lesions or rashes, hydration status appears adeuqate with normal skin turgor   PSYCH: Alert and oriented x3; speech- coherent , normal rate and volume; able to articulate logical thoughts, able to abstract reason, no tangential thoughts, no hallucinations or delusions, mentation appears normal, Mood is euthymic. Affect is appropriate for this mood state and bright. Thought content is free of suicidal ideation, hallucinations, and delusions. Dress is adequate and upkept. Eye contact is good during conversation.     Diagnostics; reviewed in Epic, see orders pending in Epic     Procedure Note:  Pause for the cause has been completed prior to the prceedure.   Patient was identified by both name and date of birth   The correct " site was identified   Site was marked by provider    Written informed consent correct and signed or verbal authorization  to proceed was obtained   Verifed necessary supplies, equipment, and diagnostics are available    Time out was performed immediately prior to procedure    Objective: The lesion(s) is/are of the above mentioned size and location. Using the usual technique, cryotherapy with liquid nitrogen x 3 was performed. The procedure was well tolerated and without complications.

## 2023-10-26 NOTE — LETTER
Opioid / Opioid Plus Controlled Substance Agreement    This is an agreement between you and your provider about the safe and appropriate use of controlled substance/opioids prescribed by your care team. Controlled substances are medicines that can cause physical and mental dependence (abuse).    There are strict laws about having and using these medicines. We here at [facility] are committing to working with you in your efforts to get better. To support you in this work, we ll help you schedule regular office appointments for medicine refills. If we must cancel or change your appointment for any reason, we ll make sure you have enough medicine to last until your next appointment.     As a Provider, I will:  Listen carefully to your concerns and treat you with respect.   Recommend a treatment plan that I believe is in your best interest. This plan may involve therapies other than opioid pain medication.   Talk with you often about the possible benefits, and the risk of harm of any medicine that we prescribe for you.   Provide a plan on how to taper (discontinue or go off) using this medicine if the decision is made to stop its use.    As a Patient, I understand that opioid(s):   Are a controlled substance prescribed by my care team to help me function or work and manage my condition(s).   Are strong medicines and can cause serious side effects such as:  Drowsiness, which can seriously affect my driving ability  A lower breathing rate, enough to cause death  Harm to my thinking ability   Depression   Abuse of and addiction to this medicine  Need to be taken exactly as prescribed. Combining opioids with certain medicines or chemicals (such as illegal drugs, sedatives, sleeping pills, and benzodiazepines) can be dangerous or even fatal. If I stop opioids suddenly, I may have severe withdrawal symptoms.  Do not work for all types of pain nor for all patients. If they re not helpful, I may be asked to stop  them.        The risks, benefits and side effects of these medicine(s) were explained to me. I agree that:  I will take part in other treatments as advised by my care team. This may be psychiatry or counseling, physical therapy, behavioral therapy, group treatment or a referral to a specialist.     I will keep all my appointments. I understand that this is part of the monitoring of opioids. My care team may require an office visit for EVERY opioid/controlled substance refill. If I miss appointments or don t follow instructions, my care team may stop my medicine.    I will take my medicines as prescribed. I will not change the dose or schedule unless my care team tells me to. There will be no refills if I run out early.     I may be asked to come to the clinic and complete a urine drug test or complete a pill count at any time. If I don t give a urine sample or participate in a pill count, the care team may stop my medicine.    I will only receive prescriptions from this clinic for chronic pain. If I am treated by another provider for acute pain issues, I will tell them that I am taking opioid pain medication for chronic pain and that I have a treatment agreement with this provider. I will inform my [facility] care team within one business day if I am given a prescription for any pain medication by another healthcare provider. My [facility] care team can contact other providers and pharmacists about my use of any medicines.    It is up to me to make sure that I don t run out of my medicines on weekends or holidays. If my care team is willing to refill my opioid prescription without a visit, I must request refills only during office hours. Refills may take up to 3 business days to process. I will use one pharmacy to fill all my opioid and other controlled substance prescriptions. I will notify the clinic about any changes to my insurance or medication availability.    I am responsible for my  prescriptions. If the medicine/prescription is lost, stolen or destroyed, it will not be replaced. I also agree not to share controlled substance medicines with anyone.    I am aware I should not use any illegal or recreational drugs. I agree not to drink alcohol unless my care team says I can.       If I enroll in the [place] [facility] program, I will tell my care team prior to my next refill.     I will tell my care team right away if I become pregnant, have a new medical problem treated outside of my regular clinic, or have a change in my medications.    I understand that this medicine can affect my thinking, judgment and reaction time. Alcohol and drugs affect the brain and body, which can affect the safety of my driving. Being under the influence of alcohol or drugs can affect my decision-making, behaviors, personal safety, and the safety of others. Driving while impaired (DWI) can occur if a person is driving, operating, or in physical control of a car, motorcycle, boat, snowmobile, ATV, motorbike, off-road vehicle, or any other motor vehicle ([place] Statute 169A.20). I understand the risk if I choose to drive or operate any vehicle or machinery.    I understand that if I do not follow any of the conditions above, my prescriptions or treatment may be stopped or changed.          Opioids  What You Need to Know    What are opioids?   Opioids are pain medicines that must be prescribed by a doctor. They are also known as narcotics.     Examples are:   morphine (MS Contin, Irena)  oxycodone (Oxycontin)  oxycodone and acetaminophen (Percocet)  hydrocodone and acetaminophen (Vicodin, Norco)   fentanyl patch (Duragesic)   hydromorphone (Dilaudid)   methadone  codeine (Tylenol #3)     What do opioids do well?   Opioids are best for severe short-term pain such as after a surgery or injury. They may work well for cancer pain. They may help some people with long-lasting (chronic) pain.     What do opioids NOT do  well?   Opioids never get rid of pain entirely, and they don t work well for most patients with chronic pain. Opioids don t reduce swelling, one of the causes of pain.                                    Other ways to manage chronic pain and improve function include:     Treat the health problem that may be causing pain  Anti-inflammation medicines, which reduce swelling and tenderness, such as ibuprofen (Advil, Motrin) or naproxen (Aleve)  Acetaminophen (Tylenol)  Antidepressants and anti-seizure medicines, especially for nerve pain  Topical treatments such as patches or creams  Injections or nerve blocks  Chiropractic or osteopathic treatment  Acupuncture, massage, deep breathing, meditation, visual imagery, aromatherapy  Use heat or ice at the pain site  Physical therapy   Exercise  Stop smoking  Take part in therapy       Risks and side effects     Talk to your doctor before you start or decide to keep taking opioids. Possible side effects include:    Lowering your breathing rate enough to cause death  Overdose, including death, especially if taking higher than prescribed doses  Worse depression symptoms; less pleasure in things you usually enjoy  Feeling tired or sluggish  Slower thoughts or cloudy thinking  Being more sensitive to pain over time; pain is harder to control  Trouble sleeping or restless sleep  Changes in hormone levels (for example, less testosterone)  Changes in sex drive or ability to have sex  Constipation  Unsafe driving  Itching and sweating  Dizziness  Nausea, throwing up and dry mouth    What else should I know about opioids?    Opioids may lead to dependence, tolerance, or addiction.    Dependence means that if you stop or reduce the medicine too quickly, you will have withdrawal symptoms. These include loose poop (diarrhea), jitters, flu-like symptoms, nervousness and tremors. Dependence is not the same as addiction.                     Tolerance means needing higher doses over time to  get the same effect. This may increase the chance of serious side effects.    Addiction is when people improperly use a substance that harms their body, their mind or their relations with others. Use of opiates can cause a relapse of addiction if you have a history of drug or alcohol abuse.    People who have used opioids for a long time may have a lower quality of life, worse depression, higher levels of pain and more visits to doctors.    You can overdose on opioids. Take these steps to lower your risk of overdose:    Recognize the signs:  Signs of overdose include decrease or loss of consciousness (blackout), slowed breathing, trouble waking up and blue lips. If someone is worried about overdose, they should call 911.    Talk to your doctor about Narcan (naloxone).   If you are at risk for overdose, you may be given a prescription for Narcan. This medicine very quickly reverses the effects of opioids.   If you overdose, a friend or family member can give you Narcan while waiting for the ambulance. They need to know the signs of overdose and how to give Narcan.     Don't use alcohol or street drugs.   Taking them with opioids can cause death.    Do not take any of these medicines unless your doctor says it s OK. Taking these with opioids can cause death:  Benzodiazepines, such as lorazepam (Ativan), alprazolam (Xanax) or diazepam (Valium)  Muscle relaxers, such as cyclobenzaprine (Flexeril)  Sleeping pills like zolpidem (Ambien)   Other opioids      How to keep you and other people safe while taking opioids:    Never share your opioids with others.  Opioid medicines are regulated by the Drug Enforcement Agency (MER). Selling or sharing medications is a criminal act.    2. Be sure to store opioids in a secure place, locked up if possible. Young children can easily swallow them and overdose.    3. When you are traveling with your medicines, keep them in the original bottles. If you use a pill box, be sure you also  carry a copy of your medicine list from your clinic or pharmacy.    4. Safe disposal of opioids    Most pharmacies have places to get rid of medicine, called disposal kiosks. Medicine disposal options are also available in every [facility]. Search your county and  medication disposal  to find more options. You can find more details at:  [email or web address]     I agree that my provider, clinic care team, and pharmacy may work with any city, state or federal law enforcement agency that investigates the misuse, sale, or other diversion of my controlled medicine. I will allow my provider to discuss my care with, or share a copy of, this agreement with any other treating provider, pharmacy or emergency room where I receive care.    I have read this agreement and have asked questions about anything I did not understand.    _______________________________________________________  Patient Signature - Luther Kam _____________________                   Date     _______________________________________________________  Provider Signature - Sarah Hickey PA-C   _____________________                   Date     _______________________________________________________  Witness Signature (required if provider not present while patient signing)   _____________________                   Date

## 2023-11-13 ENCOUNTER — LAB (OUTPATIENT)
Dept: LAB | Facility: OTHER | Age: 88
End: 2023-11-13
Payer: MEDICARE

## 2023-11-13 DIAGNOSIS — G89.29 CHRONIC BILATERAL LOW BACK PAIN WITHOUT SCIATICA: ICD-10-CM

## 2023-11-13 DIAGNOSIS — Z12.5 SCREENING FOR PROSTATE CANCER: ICD-10-CM

## 2023-11-13 DIAGNOSIS — Z13.220 SCREENING FOR LIPID DISORDERS: ICD-10-CM

## 2023-11-13 DIAGNOSIS — F11.20 CONTINUOUS OPIOID DEPENDENCE (H): ICD-10-CM

## 2023-11-13 DIAGNOSIS — F10.10 ALCOHOL ABUSE, EPISODIC DRINKING BEHAVIOR: ICD-10-CM

## 2023-11-13 DIAGNOSIS — M54.50 CHRONIC BILATERAL LOW BACK PAIN WITHOUT SCIATICA: ICD-10-CM

## 2023-11-13 DIAGNOSIS — D64.9 LOW HEMOGLOBIN: ICD-10-CM

## 2023-11-13 LAB
ALBUMIN SERPL BCG-MCNC: 4.3 G/DL (ref 3.5–5.2)
ALP SERPL-CCNC: 54 U/L (ref 40–129)
ALT SERPL W P-5'-P-CCNC: 19 U/L (ref 0–70)
ANION GAP SERPL CALCULATED.3IONS-SCNC: 11 MMOL/L (ref 7–15)
AST SERPL W P-5'-P-CCNC: 23 U/L (ref 0–45)
BILIRUB SERPL-MCNC: 0.6 MG/DL
BUN SERPL-MCNC: 18.7 MG/DL (ref 8–23)
CALCIUM SERPL-MCNC: 9.4 MG/DL (ref 8.8–10.2)
CHLORIDE SERPL-SCNC: 101 MMOL/L (ref 98–107)
CHOLEST SERPL-MCNC: 164 MG/DL
CREAT SERPL-MCNC: 0.81 MG/DL (ref 0.67–1.17)
CREAT UR-MCNC: 88 MG/DL
DEPRECATED HCO3 PLAS-SCNC: 27 MMOL/L (ref 22–29)
EGFRCR SERPLBLD CKD-EPI 2021: 85 ML/MIN/1.73M2
ERYTHROCYTE [DISTWIDTH] IN BLOOD BY AUTOMATED COUNT: 14.7 % (ref 10–15)
GLUCOSE SERPL-MCNC: 95 MG/DL (ref 70–99)
HCT VFR BLD AUTO: 38 % (ref 40–53)
HDLC SERPL-MCNC: 89 MG/DL
HGB BLD-MCNC: 12.5 G/DL (ref 13.3–17.7)
LDLC SERPL CALC-MCNC: 65 MG/DL
MCH RBC QN AUTO: 31.3 PG (ref 26.5–33)
MCHC RBC AUTO-ENTMCNC: 32.9 G/DL (ref 31.5–36.5)
MCV RBC AUTO: 95 FL (ref 78–100)
NONHDLC SERPL-MCNC: 75 MG/DL
PLATELET # BLD AUTO: 254 10E3/UL (ref 150–450)
POTASSIUM SERPL-SCNC: 4.4 MMOL/L (ref 3.4–5.3)
PROT SERPL-MCNC: 7.2 G/DL (ref 6.4–8.3)
PSA SERPL DL<=0.01 NG/ML-MCNC: 2.25 NG/ML
RBC # BLD AUTO: 4 10E6/UL (ref 4.4–5.9)
SODIUM SERPL-SCNC: 139 MMOL/L (ref 135–145)
TRIGL SERPL-MCNC: 50 MG/DL
WBC # BLD AUTO: 5.7 10E3/UL (ref 4–11)

## 2023-11-13 PROCEDURE — 80053 COMPREHEN METABOLIC PANEL: CPT

## 2023-11-13 PROCEDURE — 85027 COMPLETE CBC AUTOMATED: CPT

## 2023-11-13 PROCEDURE — G0480 DRUG TEST DEF 1-7 CLASSES: HCPCS

## 2023-11-13 PROCEDURE — 80061 LIPID PANEL: CPT

## 2023-11-13 PROCEDURE — 36415 COLL VENOUS BLD VENIPUNCTURE: CPT

## 2023-11-13 PROCEDURE — G0103 PSA SCREENING: HCPCS

## 2023-11-13 NOTE — LETTER
November 20, 2023      Luther Kam  [address] [facility] [address]        Dear ,    We are writing to inform you of your test results.    Your test results fall within the expected range(s) or remain unchanged from previous results.  Please continue with current treatment plan.    Resulted Orders   Comprehensive metabolic panel (BMP + Alb, Alk Phos, ALT, AST, Total. Bili, TP)   Result Value Ref Range    Sodium 139 135 - 145 mmol/L      Comment:      Reference intervals for this test were updated on 09/26/2023 to more accurately reflect our healthy population. There may be differences in the flagging of prior results with similar values performed with this method. Interpretation of those prior results can be made in the context of the updated reference intervals.     Potassium 4.4 3.4 - 5.3 mmol/L    Carbon Dioxide (CO2) 27 22 - 29 mmol/L    Anion Gap 11 7 - 15 mmol/L    Urea Nitrogen 18.7 8.0 - 23.0 mg/dL    Creatinine 0.81 0.67 - 1.17 mg/dL    GFR Estimate 85 >60 mL/min/1.73m2    Calcium 9.4 8.8 - 10.2 mg/dL    Chloride 101 98 - 107 mmol/L    Glucose 95 70 - 99 mg/dL    Alkaline Phosphatase 54 40 - 129 U/L    AST 23 0 - 45 U/L      Comment:      Reference intervals for this test were updated on 6/12/2023 to more accurately reflect our healthy population. There may be differences in the flagging of prior results with similar values performed with this method. Interpretation of those prior results can be made in the context of the updated reference intervals.    ALT 19 0 - 70 U/L      Comment:      Reference intervals for this test were updated on 6/12/2023 to more accurately reflect our healthy population. There may be differences in the flagging of prior results with similar values performed with this method. Interpretation of those prior results can be made in the context of the updated reference intervals.      Protein Total 7.2 6.4 - 8.3 g/dL    Albumin 4.3 3.5 - 5.2 g/dL    Bilirubin  Total 0.6 <=1.2 mg/dL   Lipid panel reflex to direct LDL Fasting   Result Value Ref Range    Cholesterol 164 <200 mg/dL    Triglycerides 50 <150 mg/dL    Direct Measure HDL 89 >=40 mg/dL    LDL Cholesterol Calculated 65 <=100 mg/dL    Non HDL Cholesterol 75 <130 mg/dL    Narrative    Cholesterol  Desirable:  <200 mg/dL    Triglycerides  Normal:  Less than 150 mg/dL  Borderline High:  150-199 mg/dL  High:  200-499 mg/dL  Very High:  Greater than or equal to 500 mg/dL    Direct Measure HDL  Female:  Greater than or equal to 50 mg/dL   Male:  Greater than or equal to 40 mg/dL    LDL Cholesterol  Desirable:  <100mg/dL  Above Desirable:  100-129 mg/dL   Borderline High:  130-159 mg/dL   High:  160-189 mg/dL   Very High:  >= 190 mg/dL    Non HDL Cholesterol  Desirable:  130 mg/dL  Above Desirable:  130-159 mg/dL  Borderline High:  160-189 mg/dL  High:  190-219 mg/dL  Very High:  Greater than or equal to 220 mg/dL   PSA, screen   Result Value Ref Range    Prostate Specific Antigen Screen 2.25 ng/mL      Comment:      No reference ranges have been established for patients over 80 years.    Narrative    This result is obtained using the Roche Elecsys total PSA method on the luis alberto e601 immunoassay analyzer. Results obtained with different assay methods or kits cannot be used interchangeably.   CBC with platelets   Result Value Ref Range    WBC Count 5.7 4.0 - 11.0 10e3/uL    RBC Count 4.00 (L) 4.40 - 5.90 10e6/uL    Hemoglobin 12.5 (L) 13.3 - 17.7 g/dL    Hematocrit 38.0 (L) 40.0 - 53.0 %    MCV 95 78 - 100 fL    MCH 31.3 26.5 - 33.0 pg    MCHC 32.9 31.5 - 36.5 g/dL    RDW 14.7 10.0 - 15.0 %    Platelet Count 254 150 - 450 10e3/uL   Urine Drug Confirmation Panel   Result Value Ref Range    Dihydrocodeine ng/mL 132 (H) <50 ng/mL    Dihydrocodeine 150 Absent ng/mg [creat]      Comment:      Hydromorphone and dihydrocodeine are expected metabolites of hydrocodone. Hydromorphone and dihydrocodeine are also available as scheduled  prescription medications.    Hydrocodone ng/mL 273 (H) <50 ng/mL    Hydrocodone 310 Absent ng/mg [creat]      Comment:      Sources of hydrocodone include scheduled prescription medications.  Hydromorphone and dihydrocodeine are expected metabolites of hydrocodone. Hydromorphone and dihydrocodeine are also available as scheduled prescription medications.    Hydromorphone ng/mL 162 (H) <50 ng/mL    Hydromorphone 184 Absent ng/mg [creat]      Comment:      Hydromorphone may be present as a metabolite of morphine.  Concentrations of hydromorphone rarely exceed 5% of the morphine concentration when this is the source of hydromorphone.  Hydromorphone and dihydrocodeine are expected metabolites of hydrocodone. Hydromorphone and dihydrocodeine are also available as scheduled prescription medications.    Narrative    This test was developed and its performance characteristics determined by the [facility],  [facility]. It has not been cleared or approved by the FDA. The laboratory is regulated under CLIA as qualified to perform high-complexity testing. This test is used for clinical purposes. It should not be regarded as investigational or for research.    Drugs with concentrations less than the cutoff will not be reported.  The drugs with applicable detection cutoff limits that are included within the Drug Confirmation Panel are:    The following drugs are detected with a cutoff of 3 ng/ml: FENTANYL    The following drugs are detected with a cutoff of 5 ng/mL: 6-ACETYLMORPHINE, BUPRENORPHINE, NALOXONE, NORBUPRENORPHINE, NORFENTANYL    The following drugs are detected with a cutoff of 10 ng/mL: SUFENTANIL    The following drugs are detected with a cutoff of 20 ng/mL: PCP (PHENCYCLIDINE)    The following drugs are detected with a cutoff of 50 ng/mL: 7-AMINOCLONAZEPAM, 7-AMINOFLUNITRAZEPAM, ALPRAZOLAM, AMPHETAMINE, A-OH-ALPRAZOLAM, A-OH-MIDAZOLAM, A-OH-TRIAZOLAM, BENZOYLECGONINE  (Cocaine Metabolite), CLONAZEPAM, COCAETHYLENE (Cocaine Metabolite), CODEINE, DIAZEPAM, DIHYDROCODEINE, EDDP (Methadone Metabolite), HYDROCODONE, HYDROMORPHONE, LORAZEPAM, MDA (3,4-Methylenedioxyamphetamine), MDEA (3,3-Vkjabuqjkmnrmz-U-ethylcathinone), MDMA (Methylenedioxyamphetamine,Ecstasy), MEPERIDINE, METHADONE, METHAMPHETAMINE, METHYLPHENIDATE (Ritalin), MORPHINE, NALTREXONE, N-DESMETH-TAPENTADOL, NORCODEINE, NORDIAZEPAM, NORMEPERIDINE, O-ALBERT-TRAMADOL, OXAZEPAM, OXYCODONE, OXYMORPHONE, PROPOXYPHENE, RITALINIC ACID, TAPENTADOL, TEMAZEPAM, THEBAINE, TRAMADOL    The following drugs are detected with a cutoff of 100 ng/mL: GABAPENTIN, KETAMINE    The following drugs are detected with a cutoff of 200 ng/mL: PREGABALIN     Urine Creatinine for Drug Screen Panel   Result Value Ref Range    Creatinine Urine for Drug Screen 88 mg/dL      Comment:      The reference range has not been established for creatinine in random urines. The results should be integrated into the clinical context for interpretation.       If you have any questions or concerns, please call the clinic at the number listed above.       Sincerely,      Sarah Hickey PA-C

## 2023-11-17 LAB
DHC UR CFM-MCNC: 132 NG/ML
DHC/CREAT UR: 150 NG/MG {CREAT}
HYDROCODONE UR CFM-MCNC: 273 NG/ML
HYDROCODONE/CREAT UR: 310 NG/MG {CREAT}
HYDROMORPHONE UR CFM-MCNC: 162 NG/ML
HYDROMORPHONE/CREAT UR: 184 NG/MG {CREAT}

## 2023-11-24 DIAGNOSIS — G89.29 CHRONIC BILATERAL LOW BACK PAIN WITHOUT SCIATICA: ICD-10-CM

## 2023-11-24 DIAGNOSIS — M54.50 CHRONIC BILATERAL LOW BACK PAIN WITHOUT SCIATICA: ICD-10-CM

## 2023-11-24 NOTE — TELEPHONE ENCOUNTER
Medication Question or Refill        What medication are you calling about (include dose and sig)?: Hydrocodone    Preferred Pharmacy:       [id number] - [place], [place] - [address] AT [facility] [address]  [address]  [place] [facility] [address]  Phone: [phone] Fax: [phone]      Controlled Substance Agreement on file:   CSA -- Patient Level:     [Media Unavailable] Controlled Substance Agreement - Opioid - Scan on 11/2/2023  9:42 AM   [Media Unavailable] Controlled Substance Agreement - Opioid - Scan on 4/18/2021  4:19 PM   [Media Unavailable] Controlled Substance Agreement - Opioid - Scan on 8/5/2019  3:04 PM: Opiod/Opiod Plus Controlled Substance Agreement 08/05/19       Who prescribed the medication?: Angely Lynch    Do you need a refill? Yes    When did you use the medication last?     Patient offered an appointment? No    Do you have any questions or concerns?  No      Do not call patient he will check with pharmacy

## 2023-11-27 RX ORDER — HYDROCODONE BITARTRATE AND ACETAMINOPHEN 5; 325 MG/1; MG/1
1-2 TABLET ORAL EVERY 8 HOURS PRN
Qty: 90 TABLET | Refills: 0 | Status: SHIPPED | OUTPATIENT
Start: 2023-11-27 | End: 2024-01-23

## 2023-11-27 NOTE — TELEPHONE ENCOUNTER
reviewed. Due for routine fill. E-prescribed.     Ruddy Hickey PA-C  [facility]

## 2023-12-27 ENCOUNTER — NURSE TRIAGE (OUTPATIENT)
Dept: FAMILY MEDICINE | Facility: OTHER | Age: 88
End: 2023-12-27

## 2023-12-27 ENCOUNTER — ALLIED HEALTH/NURSE VISIT (OUTPATIENT)
Dept: FAMILY MEDICINE | Facility: OTHER | Age: 88
End: 2023-12-27
Payer: MEDICARE

## 2023-12-27 DIAGNOSIS — R03.0 ELEVATED BLOOD PRESSURE READING WITHOUT DIAGNOSIS OF HYPERTENSION: Primary | ICD-10-CM

## 2023-12-27 PROCEDURE — 99207 PR NO CHARGE NURSE ONLY: CPT

## 2023-12-27 NOTE — TELEPHONE ENCOUNTER
"S-(situation): pt came to office with a high blood pressure reading from his home BP machine.     B-(background): never been on bp meds, never had hypertension issues     A-(assessment): pt is asymptomatic. 191/101, 177/98 and 158/88    R-(recommendations): huddled with provider to be seen within 3/7 days due to holiday. RN discussed in detail when to be seen sooner.     Patient verbalized understanding and in agreement with plan of care.     Jovana Huber RN      Reason for Disposition   Systolic BP >= 160 OR Diastolic >= 100    Additional Information   Negative: Sounds like a life-threatening emergency to the triager   Negative: Symptom is main concern (e.g., headache, chest pain)   Negative: Low blood pressure is main concern   Negative: Systolic BP >= 160 OR Diastolic >= 100, and any cardiac (e.g., breathing difficulty, chest pain) or neurologic symptoms (e.g., new-onset blurred or double vision)   Negative: Pregnant 20 or more weeks (or postpartum < 6 weeks) with new hand or face swelling   Negative: Pregnant 20 or more weeks (or postpartum < 6 weeks) and Systolic BP >= 160 OR Diastolic >= 110   Negative: Patient sounds very sick or weak to the triager   Negative: Systolic BP >= 200 OR Diastolic >= 120 and having NO cardiac or neurologic symptoms   Negative: Pregnant 20 or more weeks (or postpartum < 6 weeks) with Systolic BP >= 140 OR Diastolic >= 90   Negative: Systolic BP >= 180 OR Diastolic >= 110, and missed most recent dose of blood pressure medication   Negative: Systolic BP >= 180 OR Diastolic >= 110   Negative: Patient wants to be seen   Negative: Ran out of BP medications   Negative: Taking BP medications and feels is having side effects (e.g., impotence, cough, dizziness)    Answer Assessment - Initial Assessment Questions  1. BLOOD PRESSURE: \"What is the blood pressure?\" \"Did you take at least two measurements 5 minutes apart?\"      194/101, 177/97, 158/91  2. ONSET: \"When did you take your blood " "pressure?\"      Home   3. HOW: \"How did you take your blood pressure?\" (e.g., automatic home BP monitor, visiting nurse)      Automatic   4. HISTORY: \"Do you have a history of high blood pressure?\"      no  5. MEDICINES: \"Are you taking any medicines for blood pressure?\" \"Have you missed any doses recently?\"      none  6. OTHER SYMPTOMS: \"Do you have any symptoms?\" (e.g., blurred vision, chest pain, difficulty breathing, headache, weakness)      none  7. PREGNANCY: \"Is there any chance you are pregnant?\" \"When was your last menstrual period?\"      N/a    Protocols used: Blood Pressure - High-A-OH    "

## 2024-01-03 ENCOUNTER — OFFICE VISIT (OUTPATIENT)
Dept: FAMILY MEDICINE | Facility: OTHER | Age: 89
End: 2024-01-03
Payer: MEDICARE

## 2024-01-03 VITALS
WEIGHT: 147 LBS | HEART RATE: 83 BPM | BODY MASS INDEX: 24.49 KG/M2 | OXYGEN SATURATION: 99 % | DIASTOLIC BLOOD PRESSURE: 90 MMHG | SYSTOLIC BLOOD PRESSURE: 138 MMHG | RESPIRATION RATE: 18 BRPM | TEMPERATURE: 98.2 F | HEIGHT: 65 IN

## 2024-01-03 DIAGNOSIS — G61.82 MULTIFOCAL MOTOR NEUROPATHY (H): ICD-10-CM

## 2024-01-03 DIAGNOSIS — R03.0 ELEVATED BLOOD PRESSURE READING WITHOUT DIAGNOSIS OF HYPERTENSION: Primary | ICD-10-CM

## 2024-01-03 DIAGNOSIS — F11.20 CONTINUOUS OPIOID DEPENDENCE (H): ICD-10-CM

## 2024-01-03 PROCEDURE — 99213 OFFICE O/P EST LOW 20 MIN: CPT | Performed by: PHYSICIAN ASSISTANT

## 2024-01-03 ASSESSMENT — PAIN SCALES - GENERAL: PAINLEVEL: NO PAIN (0)

## 2024-01-03 NOTE — PROGRESS NOTES
Assessment & Plan       ICD-10-CM    1. Elevated blood pressure reading without diagnosis of hypertension  R03.0       2. 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 Continuous opioid dependence (H)  F11.20           1. Recently elevated blood pressure, higher than previous readings, up to 190 systolic and over 100 diastolic. This is likely secondary to his high sodium meal on Christmas as his pressures have come down nicely since then. I recommend he monitor his salt and caffeine intake and continue to monitor home pressures for any rising readings. Blood pressures today back to the normal range given his age. Contact the clinic for any worsening pressures or symptoms.    BP Readings from Last 6 Encounters:   01/03/24 (!) 138/90   10/26/23 128/82   07/31/23 110/74   03/16/23 132/80   11/08/22 126/78   10/14/22 136/78     2-3. Follows closely with PCP.    Osmar Natarajan PA-C  [facility] MICHELLE Sloan is a 88 year old, presenting for the following health issues:  Blood pressure concerns        1/3/2024     3:36 PM   Additional Questions   Roomed by Vivi   Accompanied by Self         1/3/2024     3:36 PM   Patient Reported Additional Medications   Patient reports taking the following new medications none     Blood pressure readings the last couple days at home were:  137/83, 124/82 and 124/82.  History of Present Illness       Reason for visit:  High blood pressure readings  Symptom onset:  1-2 weeks ago  Symptoms include:  No symptoms blood pressure readings just consistently higher than normal  Symptom intensity:  Mild  Symptom progression:  Staying the same  Had these symptoms before:  No  What makes it worse:  None  What makes it better:  None    He eats 4 or more servings of fruits and vegetables daily.He consumes 0 sweetened beverage(s) daily.He exercises with enough effort to increase his heart rate 10 to 19 minutes per day.  He exercises with enough effort to  "increase his heart rate 3 or less days per week.   He is taking medications regularly.     The day after Christmas, his blood pressure started spiking, up to 190/120 at homes. He came in for a nursing blood pressure check in 12/27 and had readings of 191/101, 177/98 and 158/88. He has been asymptomatic and denies any headaches, palpitations or chest pain. His blood pressure has come down since then with readings at home the past few days as above. He admits to eating a very salty meal for Christmas and wonder if this could have played a role. He denies heavy caffeine use.     Review of Systems   Constitutional, HEENT, cardiovascular, pulmonary, gi and gu systems are negative, except as otherwise noted.      Objective    BP (!) 138/90   Pulse 83   Temp 98.2  F (36.8  C) (Temporal)   Resp 18   Ht 1.656 m (5' 5.2\")   Wt 66.7 kg (147 lb)   SpO2 99%   BMI 24.31 kg/m    Body mass index is 24.31 kg/m .  Physical Exam   GENERAL: healthy, alert and no distress  RESP: lungs clear to auscultation - no rales, rhonchi or wheezes  CV: regular rate and rhythm, normal S1 S2, no S3 or S4, no murmur, click or rub  NEURO: Normal strength and tone, mentation intact and speech normal. Gait is stable.   PSYCH: mentation appears normal, affect normal/bright                "

## 2024-01-23 DIAGNOSIS — M54.50 CHRONIC BILATERAL LOW BACK PAIN WITHOUT SCIATICA: ICD-10-CM

## 2024-01-23 DIAGNOSIS — G89.29 CHRONIC BILATERAL LOW BACK PAIN WITHOUT SCIATICA: ICD-10-CM

## 2024-01-23 RX ORDER — HYDROCODONE BITARTRATE AND ACETAMINOPHEN 5; 325 MG/1; MG/1
1-2 TABLET ORAL EVERY 8 HOURS PRN
Qty: 90 TABLET | Refills: 0 | Status: SHIPPED | OUTPATIENT
Start: 2024-01-23 | End: 2024-03-12

## 2024-01-23 NOTE — TELEPHONE ENCOUNTER
Medication Question or Refill        What medication are you calling about (include dose and sig)?: HYDROcodone-acetaminophen (NORCO) 5-325 MG tablet     Preferred Pharmacy:     [id number] - [place], [place] - [address] AT [facility] [address]  [address]  [address]  [facility] [address]  Phone: [phone] Fax: [phone]      Controlled Substance Agreement on file:   CSA -- Patient Level:     [Media Unavailable] Controlled Substance Agreement - Opioid - Scan on 11/2/2023  9:42 AM   [Media Unavailable] Controlled Substance Agreement - Opioid - Scan on 4/18/2021  4:19 PM   [Media Unavailable] Controlled Substance Agreement - Opioid - Scan on 8/5/2019  3:04 PM: Opiod/Opiod Plus Controlled Substance Agreement 08/05/19       Who prescribed the medication?: Sarah Hickey    Do you need a refill? Yes    When did you use the medication last? unknown    Patient offered an appointment? No, is scheduled for follow up 03-04-24    Do you have any questions or concerns?  No      Okay to leave a detailed message?: No at Cell number on file:  Patient does NOT want a phone call as he cannot hear on the phone.    No relevant phone numbers on file.

## 2024-01-23 NOTE — TELEPHONE ENCOUNTER
reviewed. Due for routine fill. E-prescribed.     Ruddy Hickey PA-C  [facility]

## 2024-02-15 ENCOUNTER — TELEPHONE (OUTPATIENT)
Dept: FAMILY MEDICINE | Facility: OTHER | Age: 89
End: 2024-02-15
Payer: MEDICARE

## 2024-02-15 NOTE — TELEPHONE ENCOUNTER
Patient Quality Outreach    Patient is due for the following:   Physical Annual Wellness Visit    Next Steps:   Schedule a Annual Wellness Visit    Type of outreach:    Phone, left message for patient/parent to call back. and Sent letter.      Questions for provider review:               Chapis Zee, [facility]

## 2024-03-12 ENCOUNTER — OFFICE VISIT (OUTPATIENT)
Dept: FAMILY MEDICINE | Facility: OTHER | Age: 89
End: 2024-03-12
Payer: MEDICARE

## 2024-03-12 VITALS
RESPIRATION RATE: 22 BRPM | DIASTOLIC BLOOD PRESSURE: 90 MMHG | OXYGEN SATURATION: 99 % | BODY MASS INDEX: 24.75 KG/M2 | SYSTOLIC BLOOD PRESSURE: 130 MMHG | WEIGHT: 145 LBS | TEMPERATURE: 97.6 F | HEIGHT: 64 IN | HEART RATE: 91 BPM

## 2024-03-12 DIAGNOSIS — G89.29 CHRONIC BILATERAL LOW BACK PAIN WITHOUT SCIATICA: ICD-10-CM

## 2024-03-12 DIAGNOSIS — R53.81 PHYSICAL DECONDITIONING: ICD-10-CM

## 2024-03-12 DIAGNOSIS — G61.82 MULTIFOCAL MOTOR NEUROPATHY (H): Primary | ICD-10-CM

## 2024-03-12 DIAGNOSIS — F41.1 GAD (GENERALIZED ANXIETY DISORDER): ICD-10-CM

## 2024-03-12 DIAGNOSIS — S22.080S COMPRESSION FRACTURE OF T12 VERTEBRA, SEQUELA: ICD-10-CM

## 2024-03-12 DIAGNOSIS — M51.34 DDD (DEGENERATIVE DISC DISEASE), THORACIC: ICD-10-CM

## 2024-03-12 DIAGNOSIS — L73.8 PSEUDOFOLLICULITIS: ICD-10-CM

## 2024-03-12 DIAGNOSIS — H91.93 BILATERAL HEARING LOSS, UNSPECIFIED HEARING LOSS TYPE: ICD-10-CM

## 2024-03-12 DIAGNOSIS — M40.00 ACQUIRED POSTURAL KYPHOSIS: ICD-10-CM

## 2024-03-12 DIAGNOSIS — M54.50 CHRONIC BILATERAL LOW BACK PAIN WITHOUT SCIATICA: ICD-10-CM

## 2024-03-12 DIAGNOSIS — R54 ADVANCED AGE: ICD-10-CM

## 2024-03-12 PROCEDURE — 99214 OFFICE O/P EST MOD 30 MIN: CPT | Performed by: PHYSICIAN ASSISTANT

## 2024-03-12 RX ORDER — CLINDAMYCIN PHOSPHATE 10 UG/ML
LOTION TOPICAL 2 TIMES DAILY
Qty: 60 ML | Refills: 0 | Status: SHIPPED | OUTPATIENT
Start: 2024-03-12 | End: 2024-07-29

## 2024-03-12 RX ORDER — HYDROCODONE BITARTRATE AND ACETAMINOPHEN 5; 325 MG/1; MG/1
1-2 TABLET ORAL EVERY 8 HOURS PRN
Qty: 90 TABLET | Refills: 0 | Status: SHIPPED | OUTPATIENT
Start: 2024-03-12 | End: 2024-05-07

## 2024-03-12 ASSESSMENT — PAIN SCALES - GENERAL: PAINLEVEL: NO PAIN (0)

## 2024-03-12 NOTE — PROGRESS NOTES
"Assessment & Plan     ICD-10-CM    1. 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 Chronic bilateral low back pain without sciatica  M54.50 Home Care Referral    G89.29 HYDROcodone-acetaminophen (NORCO) 5-325 MG tablet      3. ABHIJEET (generalized anxiety disorder)  F41.1 Home Care Referral      4. Bilateral hearing loss, unspecified hearing loss type  H91.93 Home Care Referral      5. Advanced age  R54 Home Care Referral      6. Physical deconditioning  R53.81 Home Care Referral      7. DDD (degenerative disc disease), thoracic  M51.34 Home Care Referral      8. Compression fracture of T12 vertebra, sequela  S22.080S Home Care Referral      9. 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         - Patient here to address several concerns     - Mainly, due to his back he is not able to do things around his house      Does leave to go to his [employer], but otherwise can't go anywhere   - Recommend physical therapy home cares and safety assessment in home as he reports hasn't fallen but a few \"close calls\"   - Order placed     - Chronic back pain      -- Stable on PRN use of Norco, only uses when needs, max usually 2 per day if having a bad day, some days doesn't take at all   - #90 per month given      Reviewed medication use and side effects including sedation, addiction, and constipation   -  reviewed, no concerns   - CSA 10/26/23   - Utox 10/26/23 no concerns     - Skin lesion on back - looks like healing, continue to follow with dermatology     - Scalp - looks like pseudofolliculitis, patient does uses electrical razor   - Recommend cleocin lotion BID until improves   - Reviewed use and side effects     ADDENDUM:   - Chronic neuropathy      Makes it hard to feel his feet, can feel some things but barely      Worried about falls due to tripping and not feeling       Walks very slowly      Not on medication at this time for this " "     Continue to monitor      Dicussed good shoes       Review of the result(s) of each unique test - 11/13/23 - Utox     Diagnosis or treatment significantly limited by social determinants of health - None     35 minutes spent on the date of the encounter doing chart review, history and exam, documentation and further activities as noted above    Ruddy Au-SANYA Lynch  [facility]         Subjective   Luther is a 89 year old, presenting for the following health issues:  home Health care      3/12/2024    11:46 AM   Additional Questions   Roomed by Alisa RAM   Accompanied by Self     HPI   Wants to see if you can help him get home health care     Skin lesion on back  - Frozen in the past   - Saw dermatology in [place] and negative biopsy      Then saw regular associated skin care derm       Biopsy was inconclusive       Told to wear silicone bandage, feels like make it worse     - Walk with cane, can't put bandage on, neuropathy in feet   - Light housekeeping, carrying garbage out, grocery shopping      Can't lift things       Everything takes 3x as long   - Personal acre - just for bandage on back   - No falls   - Safety assessment   - Drives only in town not on freeway      Does feel feet not complete numb      [employer], [employer], [employer], and clinic all within 2 miles only places he goes         Scalp lesion   - Tender and sore   - Itchy at times   - Ongoing       Review of Systems  Constitutional, HEENT, cardiovascular, pulmonary, gi and gu systems are negative, except as otherwise noted.      Objective    BP (!) 130/90   Pulse 86   Temp 97.6  F (36.4  C) (Temporal)   Resp 22   Ht 1.632 m (5' 4.25\")   Wt 65.8 kg (145 lb)   BMI 24.70 kg/m    Body mass index is 24.7 kg/m .  Physical Exam   GENERAL APPEARANCE: healthy, alert and no distress  EYES: Eyes grossly normal to inspection, PERRLA, conjunctivae and sclerae without injection or discharge, EOM intact   RESP: Lungs clear to " auscultation - no rales, rhonchi or wheezes    CV: Regular rates and rhythm, normal S1 S2, no S3 or S4, no murmur, click or rub, no peripheral edema and peripheral pulses strong and symmetric bilaterally   MS: Slow shuffling gate with cane, back with severe kyphosis forward       Strengthen 5+ bilateral lower extremities with decreased sensation in foot, normal reflexes         Upper extremities with 3+ strength and normal sensation and reflexes   SKIN:   Mid-back - skin lesion - flat erythematous healing skin lesion   Right parietal scalp - clustered area of irritation and excoriation, some raised papules, erythematous base   No suspicious lesions or rashes, hydration status appears adeuqate with normal skin turgor   PSYCH: Alert and oriented x3; speech- coherent , normal rate and volume; able to articulate logical thoughts, able to abstract reason, no tangential thoughts, no hallucinations or delusions, mentation appears normal, Mood is euthymic. Affect is appropriate for this mood state and bright. Thought content is free of suicidal ideation, hallucinations, and delusions. Dress is adequate and upkept. Eye contact is good during conversation.       Diagnostics: none         Signed Electronically by: Sarah Hickey PA-C

## 2024-03-14 ENCOUNTER — TELEPHONE (OUTPATIENT)
Dept: FAMILY MEDICINE | Facility: OTHER | Age: 89
End: 2024-03-14
Payer: MEDICARE

## 2024-03-14 NOTE — TELEPHONE ENCOUNTER
Home Care is calling regarding an established patient with [facility].       Requesting orders from: Sarah Hickey  Provider is following patient: Yes  Is this a 60-day recertification request?  No    Orders Requested    Skilled Nursing  Eval and treat     Physical Therapy  Request for delay in care, service is not able to be provided within same scheduled day.   3/15    Social Work  Request for delay in care, service is not able to be provided within same scheduled day.   3/15    HHA (Home Health Aide)  Request for delay in care, service is not able to be provided within same scheduled day.   3/15    Confirmed ok to leave a detailed message with call back.  Contact information confirmed and updated as needed.    Jovana Huber RN

## 2024-03-15 NOTE — TELEPHONE ENCOUNTER
Called and left message for ok for delay in care orders as requested.     Jen Torres RN on 3/15/2024 at 1:09 PM

## 2024-03-16 ENCOUNTER — MEDICAL CORRESPONDENCE (OUTPATIENT)
Dept: HEALTH INFORMATION MANAGEMENT | Facility: CLINIC | Age: 89
End: 2024-03-16

## 2024-03-18 ENCOUNTER — TELEPHONE (OUTPATIENT)
Dept: FAMILY MEDICINE | Facility: OTHER | Age: 89
End: 2024-03-18
Payer: MEDICARE

## 2024-03-18 NOTE — TELEPHONE ENCOUNTER
INCOMING FORMS    Sender: [facility] care    Type of Form, letter or note (What is requested?): order    How was the form received?: Fax    How should forms be returned?:  Fax : [phone]    Form placed in CDL bin for review/signature if appropriate.

## 2024-03-18 NOTE — TELEPHONE ENCOUNTER
Note addendum added    OK for orders as below     Ruddy Hickey PA-C  [facility]

## 2024-03-18 NOTE — TELEPHONE ENCOUNTER
Multifocal motor neuropathy (H)    not addressed in visit note willing to addend what is unstable about this diagnosis that needs home care       Home Care is calling regarding an established patient with [facility].       Requesting orders from: Sarah Hickey  Provider is following patient: Yes  Is this a 60-day recertification request?  No    Orders Requested    Skilled Nursing  Request for initial certification (first set of orders)   Frequency:  1x/wk for 4 wks every other week for 4 weeks     Physical Therapy  Request for initial evaluation and treatment (one time)     Occupational Therapy  Request for initial evaluation and treatment (one time)     Social Work  Request for initial evaluation and treatment (one time)       Confirmed ok to leave a detailed message with call back.  Contact information confirmed and updated as needed.    [phone]    Jovana Huber RN

## 2024-03-20 ENCOUNTER — TELEPHONE (OUTPATIENT)
Dept: FAMILY MEDICINE | Facility: OTHER | Age: 89
End: 2024-03-20
Payer: MEDICARE

## 2024-03-20 NOTE — TELEPHONE ENCOUNTER
Home Care is calling regarding an established patient with [facility].       Requesting orders from: Sarah Hickey  Provider is following patient: Yes  Is this a 60-day recertification request?  No    Orders Requested    Physical Therapy  Request for initial certification (first set of orders)   Frequency:  1x/month for 1 months      Confirmed ok to leave a detailed message with call back.  Contact information confirmed and updated as needed.    [phone]    Jovana Huber RN

## 2024-03-21 ENCOUNTER — TELEPHONE (OUTPATIENT)
Dept: FAMILY MEDICINE | Facility: OTHER | Age: 89
End: 2024-03-21
Payer: MEDICARE

## 2024-03-21 NOTE — TELEPHONE ENCOUNTER
Home Care is calling regarding an established patient with [facility].     Requesting orders from: Sarah Hickey  Provider is following patient: Yes  Is this a 60-day recertification request?  No    Orders Requested    Occupational Therapy  Request for initial certification (first set of orders)   Frequency:  1x/wk for 2 wks starting 3/31    Information was gathered and will be sent to provider for review.  RN will contact Home Care with information after provider review.  Confirmed ok to leave a detailed message with call back.  Contact information confirmed and updated as needed.    Barb Chandra RN

## 2024-03-22 ENCOUNTER — TELEPHONE (OUTPATIENT)
Dept: FAMILY MEDICINE | Facility: OTHER | Age: 89
End: 2024-03-22
Payer: MEDICARE

## 2024-03-22 NOTE — TELEPHONE ENCOUNTER
Form from [facility] received.  Called [facility] to have them send over new form with covering providers name.  Waiting callback from [facility].  CDL is out til 4/2. Form in TC hold bin.     Please have [facility] re faxed form with Roberto England.  He is designated provider this afternoon.

## 2024-03-22 NOTE — TELEPHONE ENCOUNTER
INCOMING FORMS    Sender: [facility]    Type of Form, letter or note (What is requested?): order    How was the form received?: Fax    How should forms be returned?:  Fax : [phone]    Form placed in CDL bin for review/signature if appropriate.  [facility] called back and are OK waiting until pcp is back in office.

## 2024-03-26 ENCOUNTER — TELEPHONE (OUTPATIENT)
Dept: FAMILY MEDICINE | Facility: OTHER | Age: 89
End: 2024-03-26
Payer: MEDICARE

## 2024-03-26 NOTE — TELEPHONE ENCOUNTER
Called and spoke with DANIEL Shirley and provided verbal approval for requested orders.     Daniela JENNINGSN, RN  [facility]

## 2024-03-26 NOTE — TELEPHONE ENCOUNTER
INCOMING FORMS    Sender: [facility]    Type of Form, letter or note (What is requested?): HHC/POC    How was the form received?: Fax    How should forms be returned?:  Fax : [phone]    Form placed in CDL bin for review/signature if appropriate.       PCP ONLY to sign forms. [facility] aware provider out until next week.

## 2024-03-27 ENCOUNTER — TELEPHONE (OUTPATIENT)
Dept: FAMILY MEDICINE | Facility: OTHER | Age: 89
End: 2024-03-27
Payer: MEDICARE

## 2024-03-27 NOTE — TELEPHONE ENCOUNTER
[facility], ok to wait for PCP to sign      BRANDY Gambino CNP  Questions or concerns please feel free to send me a [employer] message or call me  Phone : [phone]

## 2024-03-27 NOTE — TELEPHONE ENCOUNTER
INCOMING FORMS    Sender: [facility]    Type of Form, letter or note (What is requested?): orders    How was the form received?: Fax    How should forms be returned?:  Fax : [phone]    Form placed in CDL bin for review/signature if appropriate.       Form needs to be completed only by PCP. [facility] was notified yesterday on return date of pcp.

## 2024-04-02 ENCOUNTER — MEDICAL CORRESPONDENCE (OUTPATIENT)
Dept: HEALTH INFORMATION MANAGEMENT | Facility: CLINIC | Age: 89
End: 2024-04-02

## 2024-04-02 ENCOUNTER — TELEPHONE (OUTPATIENT)
Dept: FAMILY MEDICINE | Facility: OTHER | Age: 89
End: 2024-04-02
Payer: MEDICARE

## 2024-04-02 DIAGNOSIS — Z53.9 DIAGNOSIS NOT YET DEFINED: Primary | ICD-10-CM

## 2024-04-02 PROCEDURE — G0180 MD CERTIFICATION HHA PATIENT: HCPCS | Performed by: PHYSICIAN ASSISTANT

## 2024-04-02 NOTE — TELEPHONE ENCOUNTER
INCOMING FORMS    Sender: [facility]     Type of Form, letter or note (What is requested?): order    How was the form received?: Fax    How should forms be returned?:  Fax : [phone]    Form placed in CDL bin for review/signature if appropriate.

## 2024-04-04 ENCOUNTER — MEDICAL CORRESPONDENCE (OUTPATIENT)
Dept: HEALTH INFORMATION MANAGEMENT | Facility: CLINIC | Age: 89
End: 2024-04-04
Payer: MEDICARE

## 2024-04-04 ENCOUNTER — TELEPHONE (OUTPATIENT)
Dept: FAMILY MEDICINE | Facility: OTHER | Age: 89
End: 2024-04-04
Payer: MEDICARE

## 2024-04-04 NOTE — TELEPHONE ENCOUNTER
INCOMING FORMS    Sender: [facility]    Type of Form, letter or note (What is requested?): order    How was the form received?: Fax    How should forms be returned?:  Fax : [phone]    Form placed in CDL bin for review/signature if appropriate.

## 2024-04-11 ENCOUNTER — TELEPHONE (OUTPATIENT)
Dept: FAMILY MEDICINE | Facility: OTHER | Age: 89
End: 2024-04-11
Payer: MEDICARE

## 2024-04-11 NOTE — TELEPHONE ENCOUNTER
Home Care is calling regarding an established patient with [facility].       Requesting orders from: Sarah Hickey  Provider is following patient: Yes  Is this a 60-day recertification request?  No    Orders Requested    Social Work  Request for initial certification (first set of orders)   Frequency: 1 visit within 21 days     He is requesting help with calling home care agencies private pay home care       Confirmed ok to leave a detailed message with call back.  Contact information confirmed and updated as needed.    Jovana Huber RN

## 2024-04-16 ENCOUNTER — TELEPHONE (OUTPATIENT)
Dept: FAMILY MEDICINE | Facility: OTHER | Age: 89
End: 2024-04-16
Payer: MEDICARE

## 2024-04-16 ENCOUNTER — MEDICAL CORRESPONDENCE (OUTPATIENT)
Dept: HEALTH INFORMATION MANAGEMENT | Facility: CLINIC | Age: 89
End: 2024-04-16
Payer: MEDICARE

## 2024-04-16 NOTE — TELEPHONE ENCOUNTER
INCOMING FORMS    Sender: [facility]      Type of Form, letter or note (What is requested?): order    How was the form received?: Fax    How should forms be returned?:  Fax : [phone]    Form placed in CDL bin for review/signature if appropriate.

## 2024-05-01 ENCOUNTER — TELEPHONE (OUTPATIENT)
Dept: FAMILY MEDICINE | Facility: OTHER | Age: 89
End: 2024-05-01
Payer: MEDICARE

## 2024-05-01 NOTE — TELEPHONE ENCOUNTER
Home Care is calling regarding an established patient with [facility].     Requesting orders from: Sarah Hickey  Provider is following patient: Yes  Is this a 60-day recertification request?  No    Orders Requested    Skilled Nursing  Request for discontinuation of care on 5/10  Goals have been met/progressing.    Information was gathered and will be sent to provider for review.  RN will contact Home Care with information after provider review.  Confirmed ok to leave a detailed message with call back.  Contact information confirmed and updated as needed.    Barb Chandra RN

## 2024-05-02 NOTE — TELEPHONE ENCOUNTER
RN placed call to jenny Chavarria message on secure voicemail approving of verbal order per below from provider. Left call back number for additional questions/concerns.    MICHAELA SequeiraN, RN  [facility] ~ Registered Nurse  Clinic Triage  May 2, 2024

## 2024-05-06 ENCOUNTER — TELEPHONE (OUTPATIENT)
Dept: FAMILY MEDICINE | Facility: OTHER | Age: 89
End: 2024-05-06
Payer: MEDICARE

## 2024-05-06 DIAGNOSIS — G89.29 CHRONIC BILATERAL LOW BACK PAIN WITHOUT SCIATICA: ICD-10-CM

## 2024-05-06 DIAGNOSIS — M54.50 CHRONIC BILATERAL LOW BACK PAIN WITHOUT SCIATICA: ICD-10-CM

## 2024-05-06 NOTE — TELEPHONE ENCOUNTER
Medication Question or Refill        What medication are you calling about (include dose and sig)?: HYDROCODONE    Preferred Pharmacy:   [facility] - [place] - [address] [address]  Phone: [phone] Fax: [phone]    [id number] - [place], [place] - [address] AT [facility] [address] [facility] [address]  [address] [facility] [address]  Phone: [phone] Fax: [phone]      Controlled Substance Agreement on file:   CSA -- Patient Level:     [Media Unavailable] Controlled Substance Agreement - Opioid - Scan on 11/2/2023  9:42 AM   [Media Unavailable] Controlled Substance Agreement - Opioid - Scan on 4/18/2021  4:19 PM   [Media Unavailable] Controlled Substance Agreement - Opioid - Scan on 8/5/2019  3:04 PM: Opiod/Opiod Plus Controlled Substance Agreement 08/05/19       Who prescribed the medication?: CDL    Do you need a refill? Yes    When did you use the medication last? TODAY    Patient offered an appointment? No PT SAID HE HAD AN APPT FEW WEEKS AGO    Do you have any questions or concerns?  No      Okay to leave a detailed message?: No at Home number on file [phone] PT DOESN'T WANT TO BE CALLED, HE WILL CONTACT THE PHARMACY.

## 2024-05-07 RX ORDER — HYDROCODONE BITARTRATE AND ACETAMINOPHEN 5; 325 MG/1; MG/1
1-2 TABLET ORAL EVERY 8 HOURS PRN
Qty: 90 TABLET | Refills: 0 | Status: SHIPPED | OUTPATIENT
Start: 2024-05-07 | End: 2024-06-30

## 2024-05-07 NOTE — TELEPHONE ENCOUNTER
Pt came in again today and asked where his pain pills were. He stated this has never taken this long before and he just doesn't understand why!

## 2024-05-07 NOTE — TELEPHONE ENCOUNTER
Looks like yesterday's encounter was never routed to me. I only got today's encounter.   Medication was not pended and neither was pharmacy.      reviewed. Due for routine fill. E-prescribed.     Ruddy Hickey PA-C  [facility]      Cyclosporine Counseling:  I discussed with the patient the risks of cyclosporine including but not limited to hypertension, gingival hyperplasia,myelosuppression, immunosuppression, liver damage, kidney damage, neurotoxicity, lymphoma, and serious infections. The patient understands that monitoring is required including baseline blood pressure, CBC, CMP, lipid panel and uric acid, and then 1-2 times monthly CMP and blood pressure.

## 2024-06-28 ENCOUNTER — TELEPHONE (OUTPATIENT)
Dept: FAMILY MEDICINE | Facility: OTHER | Age: 89
End: 2024-06-28
Payer: MEDICARE

## 2024-06-28 DIAGNOSIS — G89.29 CHRONIC BILATERAL LOW BACK PAIN WITHOUT SCIATICA: ICD-10-CM

## 2024-06-28 DIAGNOSIS — M54.50 CHRONIC BILATERAL LOW BACK PAIN WITHOUT SCIATICA: ICD-10-CM

## 2024-06-28 NOTE — TELEPHONE ENCOUNTER
Medication Question or Refill        What medication are you calling about (include dose and sig)?:  HYDROCODONE    Preferred Pharmacy:   [facility] - [place], [place] - [address] [address]  Phone: [phone] Fax: [phone]    [id number] - [place], [place] - [address] AT [facility] [address] [facility] [address]  [address] [facility] [address]  Phone: [phone] Fax: [phone]      Controlled Substance Agreement on file:   CSA -- Patient Level:     [Media Unavailable] Controlled Substance Agreement - Opioid - Scan on 11/2/2023  9:42 AM   [Media Unavailable] Controlled Substance Agreement - Opioid - Scan on 4/18/2021  4:19 PM   [Media Unavailable] Controlled Substance Agreement - Opioid - Scan on 8/5/2019  3:04 PM: Opiod/Opiod Plus Controlled Substance Agreement 08/05/19       Who prescribed the medication?: BENJAMIN MALDONADO    Do you need a refill? Yes    When did you use the medication last?       Patient offered an appointment? No    Do you have any questions or concerns?  No      Okay to leave a detailed message?: No at Home number on file [phone] (home)

## 2024-06-30 RX ORDER — HYDROCODONE BITARTRATE AND ACETAMINOPHEN 5; 325 MG/1; MG/1
1-2 TABLET ORAL EVERY 8 HOURS PRN
Qty: 90 TABLET | Refills: 0 | Status: SHIPPED | OUTPATIENT
Start: 2024-06-30 | End: 2024-07-29

## 2024-07-01 NOTE — TELEPHONE ENCOUNTER
This was routed as a CC'd chart inappropriately.     reviewed. Due for routine fill. E-prescribed.     Ruddy Au-SANYA Lynch  [facility]

## 2024-07-29 ENCOUNTER — OFFICE VISIT (OUTPATIENT)
Dept: FAMILY MEDICINE | Facility: OTHER | Age: 89
End: 2024-07-29
Payer: MEDICARE

## 2024-07-29 VITALS
DIASTOLIC BLOOD PRESSURE: 78 MMHG | OXYGEN SATURATION: 99 % | HEART RATE: 82 BPM | WEIGHT: 146.5 LBS | TEMPERATURE: 98.3 F | BODY MASS INDEX: 23.54 KG/M2 | RESPIRATION RATE: 18 BRPM | HEIGHT: 66 IN | SYSTOLIC BLOOD PRESSURE: 130 MMHG

## 2024-07-29 DIAGNOSIS — G89.29 CHRONIC BILATERAL LOW BACK PAIN WITHOUT SCIATICA: ICD-10-CM

## 2024-07-29 DIAGNOSIS — J30.1 SEASONAL ALLERGIC RHINITIS DUE TO POLLEN: Primary | ICD-10-CM

## 2024-07-29 DIAGNOSIS — M54.50 CHRONIC BILATERAL LOW BACK PAIN WITHOUT SCIATICA: ICD-10-CM

## 2024-07-29 PROCEDURE — 99214 OFFICE O/P EST MOD 30 MIN: CPT | Performed by: PHYSICIAN ASSISTANT

## 2024-07-29 RX ORDER — CETIRIZINE HYDROCHLORIDE 10 MG/1
10 TABLET ORAL DAILY
Qty: 30 TABLET | Refills: 2 | Status: SHIPPED | OUTPATIENT
Start: 2024-07-29

## 2024-07-29 RX ORDER — MOMETASONE FUROATE MONOHYDRATE 50 UG/1
2 SPRAY, METERED NASAL DAILY
Qty: 17 G | Refills: 4 | Status: SHIPPED | OUTPATIENT
Start: 2024-07-29

## 2024-07-29 RX ORDER — HYDROCODONE BITARTRATE AND ACETAMINOPHEN 5; 325 MG/1; MG/1
1-2 TABLET ORAL EVERY 8 HOURS PRN
Qty: 90 TABLET | Refills: 0 | Status: SHIPPED | OUTPATIENT
Start: 2024-07-31 | End: 2024-09-24

## 2024-07-29 ASSESSMENT — ANXIETY QUESTIONNAIRES
8. IF YOU CHECKED OFF ANY PROBLEMS, HOW DIFFICULT HAVE THESE MADE IT FOR YOU TO DO YOUR WORK, TAKE CARE OF THINGS AT HOME, OR GET ALONG WITH OTHER PEOPLE?: SOMEWHAT DIFFICULT
GAD7 TOTAL SCORE: 7
IF YOU CHECKED OFF ANY PROBLEMS ON THIS QUESTIONNAIRE, HOW DIFFICULT HAVE THESE PROBLEMS MADE IT FOR YOU TO DO YOUR WORK, TAKE CARE OF THINGS AT HOME, OR GET ALONG WITH OTHER PEOPLE: SOMEWHAT DIFFICULT
3. WORRYING TOO MUCH ABOUT DIFFERENT THINGS: SEVERAL DAYS
1. FEELING NERVOUS, ANXIOUS, OR ON EDGE: MORE THAN HALF THE DAYS
2. NOT BEING ABLE TO STOP OR CONTROL WORRYING: SEVERAL DAYS
4. TROUBLE RELAXING: SEVERAL DAYS
5. BEING SO RESTLESS THAT IT IS HARD TO SIT STILL: NOT AT ALL
GAD7 TOTAL SCORE: 7
7. FEELING AFRAID AS IF SOMETHING AWFUL MIGHT HAPPEN: SEVERAL DAYS
7. FEELING AFRAID AS IF SOMETHING AWFUL MIGHT HAPPEN: SEVERAL DAYS
6. BECOMING EASILY ANNOYED OR IRRITABLE: SEVERAL DAYS
GAD7 TOTAL SCORE: 7

## 2024-07-29 ASSESSMENT — ENCOUNTER SYMPTOMS: WHEEZING: 1

## 2024-07-29 ASSESSMENT — PAIN SCALES - PAIN ENJOYMENT GENERAL ACTIVITY SCALE (PEG)
PEG_TOTALSCORE: 4.67
INTERFERED_ENJOYMENT_LIFE: 4
INTERFERED_GENERAL_ACTIVITY: 5
INTERFERED_GENERAL_ACTIVITY: 5
INTERFERED_ENJOYMENT_LIFE: 4
AVG_PAIN_PASTWEEK: 5
AVG_PAIN_PASTWEEK: 5
PEG_TOTALSCORE: 4.67

## 2024-07-29 ASSESSMENT — PATIENT HEALTH QUESTIONNAIRE - PHQ9
10. IF YOU CHECKED OFF ANY PROBLEMS, HOW DIFFICULT HAVE THESE PROBLEMS MADE IT FOR YOU TO DO YOUR WORK, TAKE CARE OF THINGS AT HOME, OR GET ALONG WITH OTHER PEOPLE: SOMEWHAT DIFFICULT
SUM OF ALL RESPONSES TO PHQ QUESTIONS 1-9: 5
SUM OF ALL RESPONSES TO PHQ QUESTIONS 1-9: 5

## 2024-07-29 ASSESSMENT — PAIN SCALES - GENERAL: PAINLEVEL: NO PAIN (0)

## 2024-07-29 NOTE — PROGRESS NOTES
Assessment & Plan     ICD-10-CM    1. Seasonal allergic rhinitis due to pollen  J30.1 mometasone (NASONEX) 50 MCG/ACT nasal spray     cetirizine (ZYRTEC) 10 MG tablet      2. Chronic bilateral low back pain without sciatica  M54.50 HYDROcodone-acetaminophen (NORCO) 5-325 MG tablet    G89.29         Allergies   - Tried Flonase in the past after seeing allergist and some over the counter tablets without success, doesn't know what time   - Recommend Zyrtec and Nasonex spray   - Discussed use and side effects     2. Chronic back pain    -- Stable on PRN use of Norco, only uses when needs, max usually 2 per day if having a bad day, some days doesn't take at all   - #90 per month given      Reviewed medication use and side effects including sedation, addiction, and constipation   -  reviewed, no concerns   - CSA 10/26/23   - Utox 10/26/23 no concerns       - Advised to schedule eye exam for his concern about cataracts       Review of the result(s) of each unique test - None     Diagnosis or treatment significantly limited by social determinants of health - None     18 minutes spent on the date of the encounter doing chart review, history and exam, documentation and further activities as noted above    The patient indicates understanding of these issues and agrees with the plan.    Ruddy Au-SANYA Lynch  [facility]         Queta Sloan is a 89 year old, presenting for the following health issues:  Allergies        7/29/2024     1:58 PM   Additional Questions   Roomed by Vanita   Accompanied by Self         7/29/2024     1:58 PM   Patient Reported Additional Medications   Patient reports taking the following new medications NA     History of Present Illness       Reason for visit:  Allergies/runny nose  Symptom onset:  More than a month  Symptoms include:  Runny nose, sneeze  Symptom intensity:  Severe  Symptom progression:  Staying the same  Had these symptoms before:  Yes  Has  "tried/received treatment for these symptoms:  Yes  Previous treatment was successful:  No  What makes it worse:  Some foods seem to trigger  What makes it better:  Sneezing    He eats 4 or more servings of fruits and vegetables daily.He consumes 2 sweetened beverage(s) daily.He exercises with enough effort to increase his heart rate 9 or less minutes per day.  He exercises with enough effort to increase his heart rate 7 days per week.   He is taking medications regularly.     - Nothing new, just more bothersome   - Did see allergy in the past   - Did a nose spray, didn't help      Did over the counter ones pills too but didn't help     - Eyes      Got checked at [facility] - told cataract       Feels cloudier       Review of Systems  Constitutional, neuro, ENT, endocrine, pulmonary, cardiac, gastrointestinal, genitourinary, musculoskeletal, integument and psychiatric systems are negative, except as otherwise noted.      Objective    /78   Pulse 82   Temp 98.3  F (36.8  C) (Temporal)   Resp 18   Ht 1.664 m (5' 5.5\")   Wt 66.5 kg (146 lb 8 oz)   SpO2 99%   BMI 24.01 kg/m    Body mass index is 24.01 kg/m .  Physical Exam   GENERAL APPEARANCE: healthy, alert and no distress  EYES: Eyes grossly normal to inspection, PERRLA, conjunctivae and sclerae without injection or discharge, EOM intact   HEENT: patient declined to remove mask and gloves   RESP: Lungs clear to auscultation - no rales, rhonchi or wheezes    CV: Regular rates and rhythm, normal S1 S2, no S3 or S4, no murmur, click or rub  MS: No musculoskeletal defects are noted and gait is age appropriate without ataxia   SKIN: No suspicious lesions or rashes, hydration status appears adeuqate with normal skin turgor   PSYCH: Alert and oriented x3; speech- coherent , normal rate and volume; able to articulate logical thoughts, able to abstract reason, no tangential thoughts, no hallucinations or delusions, mentation appears normal, Mood is euthymic. Affect " is appropriate for this mood state and bright. Thought content is free of suicidal ideation, hallucinations, and delusions. Dress is adequate and upkept. Eye contact is good during conversation.       Diagnostics: none         Signed Electronically by: Sarah Hickey PA-C

## 2024-07-29 NOTE — PATIENT INSTRUCTIONS
- Recommend Cetirizine - 1 tablet daily plus nasal spray 2 sprays each side of nose (4 in total)     - Can  pain pills on Wednesday

## 2024-07-30 ENCOUNTER — TELEPHONE (OUTPATIENT)
Dept: FAMILY MEDICINE | Facility: OTHER | Age: 89
End: 2024-07-30
Payer: MEDICARE

## 2024-07-30 DIAGNOSIS — J30.1 SEASONAL ALLERGIC RHINITIS DUE TO POLLEN: ICD-10-CM

## 2024-07-30 NOTE — TELEPHONE ENCOUNTER
mometasone (NASONEX) 50 MCG/ACT nasal spray       Prior Authorization Retail Medication Request    Medication/Dose: mometasone (NASONEX) 50 MCG/ACT nasal spray  Diagnosis and ICD code (if different than what is on RX):    New/renewal/insurance change PA/secondary ins. PA:  Previously Tried and Failed:    Rationale:      Insurance   Primary:   Insurance ID:      Secondary (if applicable):  Insurance ID:      Pharmacy Information (if different than what is on RX)  Name:    Phone:    Fax:

## 2024-07-31 NOTE — TELEPHONE ENCOUNTER
Retail Pharmacy Prior Authorization Team   Phone: [phone]    PA Initiation    Medication: mometasone (NASONEX) 50 MCG/ACT nasal spray  Insurance Company: [employer] Phone [phone] Fax [phone]  Pharmacy Filling the Rx: [id number] - [place], [place] - [address] AT [facility] [address]  Filling Pharmacy Phone: [phone]  Filling Pharmacy Fax: [phone]  Start Date: 7/31/2024

## 2024-07-31 NOTE — TELEPHONE ENCOUNTER
PA not needed.  Pharmacy was processing the incorrect insurance.  Advised pharmacy the correct insurance and they were able to process.  They will notify patient when ready.

## 2024-08-30 ENCOUNTER — TELEPHONE (OUTPATIENT)
Dept: FAMILY MEDICINE | Facility: OTHER | Age: 89
End: 2024-08-30
Payer: MEDICARE

## 2024-08-30 NOTE — TELEPHONE ENCOUNTER
Patient Quality Outreach    Patient is due for the following:   Physical Annual Wellness Visit    Next Steps:   Schedule a Annual Wellness Visit    Type of outreach:    Sent letter.      Questions for provider review:    None           Chapis Zee, [facility]

## 2024-09-24 ENCOUNTER — TELEPHONE (OUTPATIENT)
Dept: FAMILY MEDICINE | Facility: OTHER | Age: 89
End: 2024-09-24
Payer: MEDICARE

## 2024-09-24 DIAGNOSIS — G89.29 CHRONIC BILATERAL LOW BACK PAIN WITHOUT SCIATICA: ICD-10-CM

## 2024-09-24 DIAGNOSIS — M54.50 CHRONIC BILATERAL LOW BACK PAIN WITHOUT SCIATICA: ICD-10-CM

## 2024-09-24 RX ORDER — HYDROCODONE BITARTRATE AND ACETAMINOPHEN 5; 325 MG/1; MG/1
1-2 TABLET ORAL EVERY 8 HOURS PRN
Qty: 90 TABLET | Refills: 0 | Status: SHIPPED | OUTPATIENT
Start: 2024-09-24

## 2024-09-24 NOTE — TELEPHONE ENCOUNTER
reviewed. Due for routine fill. E-prescribed.     Ruddy Hickey PA-C  [facility]

## 2024-11-06 ENCOUNTER — ALLIED HEALTH/NURSE VISIT (OUTPATIENT)
Dept: FAMILY MEDICINE | Facility: OTHER | Age: 89
End: 2024-11-06
Payer: MEDICARE

## 2024-11-06 DIAGNOSIS — G89.29 CHRONIC BILATERAL LOW BACK PAIN WITHOUT SCIATICA: ICD-10-CM

## 2024-11-06 DIAGNOSIS — M54.50 CHRONIC BILATERAL LOW BACK PAIN WITHOUT SCIATICA: Primary | ICD-10-CM

## 2024-11-06 DIAGNOSIS — M54.50 CHRONIC BILATERAL LOW BACK PAIN WITHOUT SCIATICA: ICD-10-CM

## 2024-11-06 DIAGNOSIS — G89.29 CHRONIC BILATERAL LOW BACK PAIN WITHOUT SCIATICA: Primary | ICD-10-CM

## 2024-11-06 PROCEDURE — 99207 PR NO CHARGE NURSE ONLY: CPT

## 2024-11-06 RX ORDER — HYDROCODONE BITARTRATE AND ACETAMINOPHEN 5; 325 MG/1; MG/1
1-2 TABLET ORAL EVERY 8 HOURS PRN
Qty: 90 TABLET | Refills: 0 | Status: SHIPPED | OUTPATIENT
Start: 2024-11-06

## 2024-11-06 NOTE — TELEPHONE ENCOUNTER
reviewed. Due for routine fill. E-prescribed.     Ruddy Hickey PA-C  [facility]

## 2024-11-06 NOTE — TELEPHONE ENCOUNTER
Patient arrived in clinic requesting refill of Norco. He reports he has a hard time hearing and taking phone calls is more difficult so her came to the clinic to ask in person. He was also wondering about refills and if they can be automatically refilled for him. Medication pended below. Routing to provider.    HYDROcodone-acetaminophen (NORCO) 5-325 MG tablet Take 1-2 tablets by mouth every 8 hours as needed for moderate to severe pain. (Max 4 per day) 90 tablet 0 ordered 09/24/2024 -- -- -- --              Nila Lam RN on 11/6/2024 at 12:37 PM

## 2024-11-06 NOTE — PROGRESS NOTES
Patient presented to clinic just for medication refill request. Telephone encounter created.    Nila Lam RN on 11/6/2024 at 12:33 PM

## 2024-12-02 ENCOUNTER — OFFICE VISIT (OUTPATIENT)
Dept: FAMILY MEDICINE | Facility: OTHER | Age: 89
End: 2024-12-02
Payer: MEDICARE

## 2024-12-02 VITALS
WEIGHT: 148.5 LBS | SYSTOLIC BLOOD PRESSURE: 118 MMHG | TEMPERATURE: 98.8 F | HEIGHT: 65 IN | BODY MASS INDEX: 24.74 KG/M2 | OXYGEN SATURATION: 99 % | RESPIRATION RATE: 22 BRPM | HEART RATE: 80 BPM | DIASTOLIC BLOOD PRESSURE: 78 MMHG

## 2024-12-02 DIAGNOSIS — M54.50 CHRONIC BILATERAL LOW BACK PAIN WITHOUT SCIATICA: ICD-10-CM

## 2024-12-02 DIAGNOSIS — F10.10 ALCOHOL ABUSE, EPISODIC DRINKING BEHAVIOR: ICD-10-CM

## 2024-12-02 DIAGNOSIS — D64.9 LOW HEMOGLOBIN: ICD-10-CM

## 2024-12-02 DIAGNOSIS — Z13.220 SCREENING FOR LIPID DISORDERS: ICD-10-CM

## 2024-12-02 DIAGNOSIS — Z00.00 ENCOUNTER FOR MEDICARE ANNUAL WELLNESS EXAM: Primary | ICD-10-CM

## 2024-12-02 DIAGNOSIS — G61.82 MULTIFOCAL MOTOR NEUROPATHY (H): ICD-10-CM

## 2024-12-02 DIAGNOSIS — F11.20 CONTINUOUS OPIOID DEPENDENCE (H): ICD-10-CM

## 2024-12-02 DIAGNOSIS — G89.29 CHRONIC BILATERAL LOW BACK PAIN WITHOUT SCIATICA: ICD-10-CM

## 2024-12-02 PROCEDURE — G0439 PPPS, SUBSEQ VISIT: HCPCS | Performed by: PHYSICIAN ASSISTANT

## 2024-12-02 RX ORDER — HYDROCODONE BITARTRATE AND ACETAMINOPHEN 5; 325 MG/1; MG/1
1-2 TABLET ORAL EVERY 8 HOURS PRN
Qty: 90 TABLET | Refills: 0 | Status: SHIPPED | OUTPATIENT
Start: 2024-12-06

## 2024-12-02 SDOH — HEALTH STABILITY: PHYSICAL HEALTH: ON AVERAGE, HOW MANY MINUTES DO YOU ENGAGE IN EXERCISE AT THIS LEVEL?: 0 MIN

## 2024-12-02 SDOH — HEALTH STABILITY: PHYSICAL HEALTH: ON AVERAGE, HOW MANY DAYS PER WEEK DO YOU ENGAGE IN MODERATE TO STRENUOUS EXERCISE (LIKE A BRISK WALK)?: 0 DAYS

## 2024-12-02 ASSESSMENT — ANXIETY QUESTIONNAIRES
1. FEELING NERVOUS, ANXIOUS, OR ON EDGE: SEVERAL DAYS
7. FEELING AFRAID AS IF SOMETHING AWFUL MIGHT HAPPEN: SEVERAL DAYS
3. WORRYING TOO MUCH ABOUT DIFFERENT THINGS: SEVERAL DAYS
7. FEELING AFRAID AS IF SOMETHING AWFUL MIGHT HAPPEN: SEVERAL DAYS
5. BEING SO RESTLESS THAT IT IS HARD TO SIT STILL: NOT AT ALL
2. NOT BEING ABLE TO STOP OR CONTROL WORRYING: SEVERAL DAYS
GAD7 TOTAL SCORE: 5
GAD7 TOTAL SCORE: 5
4. TROUBLE RELAXING: SEVERAL DAYS
6. BECOMING EASILY ANNOYED OR IRRITABLE: NOT AT ALL
IF YOU CHECKED OFF ANY PROBLEMS ON THIS QUESTIONNAIRE, HOW DIFFICULT HAVE THESE PROBLEMS MADE IT FOR YOU TO DO YOUR WORK, TAKE CARE OF THINGS AT HOME, OR GET ALONG WITH OTHER PEOPLE: SOMEWHAT DIFFICULT
GAD7 TOTAL SCORE: 5
8. IF YOU CHECKED OFF ANY PROBLEMS, HOW DIFFICULT HAVE THESE MADE IT FOR YOU TO DO YOUR WORK, TAKE CARE OF THINGS AT HOME, OR GET ALONG WITH OTHER PEOPLE?: SOMEWHAT DIFFICULT

## 2024-12-02 ASSESSMENT — PATIENT HEALTH QUESTIONNAIRE - PHQ9
10. IF YOU CHECKED OFF ANY PROBLEMS, HOW DIFFICULT HAVE THESE PROBLEMS MADE IT FOR YOU TO DO YOUR WORK, TAKE CARE OF THINGS AT HOME, OR GET ALONG WITH OTHER PEOPLE: SOMEWHAT DIFFICULT
SUM OF ALL RESPONSES TO PHQ QUESTIONS 1-9: 6
SUM OF ALL RESPONSES TO PHQ QUESTIONS 1-9: 6

## 2024-12-02 ASSESSMENT — PAIN SCALES - PAIN ENJOYMENT GENERAL ACTIVITY SCALE (PEG)
INTERFERED_ENJOYMENT_LIFE: 8
INTERFERED_GENERAL_ACTIVITY: 5
INTERFERED_ENJOYMENT_LIFE: 8
AVG_PAIN_PASTWEEK: 5
PEG_TOTALSCORE: 6
INTERFERED_GENERAL_ACTIVITY: 5
PEG_TOTALSCORE: 6
AVG_PAIN_PASTWEEK: 5

## 2024-12-02 ASSESSMENT — SOCIAL DETERMINANTS OF HEALTH (SDOH): HOW OFTEN DO YOU GET TOGETHER WITH FRIENDS OR RELATIVES?: ONCE A WEEK

## 2024-12-02 ASSESSMENT — PAIN SCALES - GENERAL: PAINLEVEL_OUTOF10: NO PAIN (1)

## 2024-12-02 NOTE — PROGRESS NOTES
Preventive Care Visit  [facility]  Sarah Hickey PA-C, Family Medicine  Dec 2, 2024    Assessment & Plan     ICD-10-CM    1. Encounter for Medicare annual wellness exam  Z00.00       2. Screening for lipid disorders  Z13.220 Lipid panel reflex to direct LDL Fasting      3. 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 Continuous opioid dependence (H)  F11.20 Drug Confirmation Panel Urine with Creat - lab collect      5. 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 Bili, TP)      7. Chronic bilateral low back pain without sciatica  M54.50     G89.29       1/2. Annual Visit/Lipid Screening  Routine health maintenance was discussed with the patient. A lipid panel was ordered to monitor for hyperlipidemia. Most recent labs one year ago were within normal limits. Patient will do these fasting in the coming weeks. Results will be shared with the patient once available.     3. Low Hemoglobin  Patient has chronically low hemoglobin which will be monitored via a CBC in the coming weeks. Results will be shared with the patient once available.     4/5/6/7. 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 Urine drug screening was ordered and patient will do this with his fasting labs in the coming weeks. CSA was signed and updated today. A CMP was also ordered to check liver function. Results will be shared with the patient.     Patient has been advised of split billing requirements and indicates understanding: Yes    Counseling  Appropriate preventive services were addressed with this patient via screening, questionnaire, or discussion as appropriate for fall prevention, nutrition, physical activity, Tobacco-use cessation,  social engagement, weight loss and cognition.  Checklist reviewing preventive services available has been given to the patient.  Reviewed patient's diet, addressing concerns and/or questions.   The patient was instructed to see the dentist every 6 months.   Updated plan of care.  Patient reported difficulty with activities of daily living were addressed today.Patient reported safety concerns were addressed today.Addressed any concerns about safety while driving.  The patient was provided with written information regarding signs of hearing loss.   Information on urinary incontinence and treatment options given to patient.   The patient's PHQ-9 score is consistent with mild depression. He was provided with information regarding depression.   I have reviewed Opioid Use Disorder and Substance Use Disorder risk factors and made any needed referrals.       Review of the result(s) of each unique test - See list        11/13/23 - all labs   Diagnosis or treatment significantly limited by social determinants of health - none     20 minutes spent on the date of the encounter doing chart review, history and exam, documentation and further activities as noted above    The patient indicates understanding of these issues and agrees with the plan.    Follow up: 6 months     Ruddy Au-SANYA Lynch  [facility]       Queta Sloan is a 89 year old, presenting for the following:  Physical        12/2/2024     2:12 PM   Additional Questions   Roomed by Vanita   Accompanied by Self         12/2/2024     2:12 PM   Patient Reported Additional Medications   Patient reports taking the following new medications MYRA Sloan is a 89-year-old presenting for routine wellness visit. Patient would like labs done in the near future when he is fasting. Patient does not need any refills today. Patient had a bad fall on 10/31/24 and he was seen in the ED. Patient is recovered from this.    Pain History:  When  did you first notice your pain? Many years   Have you seen this provider for your pain in the past? Yes   Where in your body do you have pain? Back, general all over pain: joints  Are you seeing anyone else for your pain? No        10/26/2023     9:22 AM 7/29/2024     1:46 PM 12/2/2024     1:59 PM   PHQ-9 SCORE   PHQ-9 Total Score MyChart  5 (Mild depression) 6 (Mild depression)   PHQ-9 Total Score 6 5 6        Patient-reported           10/26/2023     9:22 AM 7/29/2024     1:48 PM 12/2/2024     2:00 PM   ABHIJEET-7 SCORE   Total Score  7 (mild anxiety) 5 (mild anxiety)   Total Score 7 7 5        Patient-reported               10/26/2023     9:22 AM 7/29/2024     1:46 PM 12/2/2024     1:59 PM   PHQ-9 SCORE   PHQ-9 Total Score MyChart  5 (Mild depression) 6 (Mild depression)   PHQ-9 Total Score 6 5 6        Patient-reported           10/26/2023     9:22 AM 7/29/2024     1:48 PM 12/2/2024     2:00 PM   ABHIJEET-7 SCORE   Total Score  7 (mild anxiety) 5 (mild anxiety)   Total Score 7 7 5        Patient-reported           10/26/2023     9:22 AM   PEG Score   PEG Total Score 5.67       Chronic Pain Follow Up:    Location of pain: General all over body  Analgesia/pain control:    - Recent changes:  none    - Overall control: Comfortably manageable    - Current treatments: pain medication    Adherence:     - Do you ever take more pain medicine than prescribed? No    - When did you take your last dose of pain medicine?  1pm   Adverse effects: No   PDMP Review         Value Time User    State PDMP site checked  Yes 11/6/2024  1:10 PM Sarah Hickey PA-C          Last CSA Agreement:   CSA -- Patient Level:     [Media Unavailable] Controlled Substance Agreement - Opioid - Scan on 11/2/2023  9:42 AM   [Media Unavailable] Controlled Substance Agreement - Opioid - Scan on 4/18/2021  4:19 PM   [Media Unavailable] Controlled Substance Agreement - Opioid - Scan on 8/5/2019  3:04 PM: Opiod/Opiod Plus Controlled Substance  Agreement 08/05/19       Last UDS: 11/17/2023      Health Care Directive  Patient does not have a Health Care Directive: Discussed advance care planning with patient; however, patient declined at this time.      12/2/2024   General Health   How would you rate your overall physical health? (!) FAIR   Feel stress (tense, anxious, or unable to sleep) Only a little      (!) STRESS CONCERN      12/2/2024   Nutrition   Diet: Regular (no restrictions)            12/2/2024   Exercise   Days per week of moderate/strenous exercise 0 days   Average minutes spent exercising at this level 0 min      (!) EXERCISE CONCERN      12/2/2024   Social Factors   Frequency of gathering with friends or relatives Once a week   Worry food won't last until get money to buy more No   Food not last or not have enough money for food? No   Do you have housing? (Housing is defined as stable permanent housing and does not include staying ouside in a car, in a tent, in an abandoned building, in an overnight shelter, or couch-surfing.) Yes   Are you worried about losing your housing? No   Lack of transportation? No   Unable to get utilities (heat,electricity)? No            12/2/2024   Fall Risk   Fallen 2 or more times in the past year? No    Trouble with walking or balance? Yes        Patient-reported         12/2/2024   Activities of Daily Living- Home Safety   Needs help with the following daily activites Transportation   Safety concerns in the home No grab bars in the bathroom            12/2/2024   Dental   Dentist two times every year? (!) NO            12/2/2024   Hearing Screening   Hearing concerns? (!) I FEEL THAT PEOPLE ARE MUMBLING OR NOT SPEAKING CLEARLY.    (!) I NEED TO ASK PEOPLE TO SPEAK UP OR REPEAT THEMSELVES.    (!) TROUBLE UNDERSTANDING SOFT OR WHISPERED SPEECH.    (!) TROUBLE UNDERSTANDING SPEECH ON THE TELEPHONE       Multiple values from one day are sorted in reverse-chronological order         12/2/2024   Driving Risk  Screening   Patient/family members have concerns about driving (!) YES, only drives a few blocks            12/2/2024   General Alertness/Fatigue Screening   Have you been more tired than usual lately? No            12/2/2024   Urinary Incontinence Screening   Bothered by leaking urine in past 6 months Yes            12/2/2024   TB Screening   Were you born outside of the [place]? No          Today's PHQ-9 Score:       12/2/2024     1:59 PM   PHQ-9 SCORE   PHQ-9 Total Score MyChart 6 (Mild depression)   PHQ-9 Total Score 6        Patient-reported         12/2/2024   Substance Use   Alcohol more than 3/day or more than 7/wk Not Applicable   Do you have a current opioid prescription? (!) YES   How severe/bad is pain from 1 to 10? 1/10   Do you use any other substances recreationally? No      Social History     Tobacco Use    Smoking status: Former     Current packs/day: 0.50     Types: Cigarettes    Smokeless tobacco: Never    Tobacco comments:     socially, not daily   Vaping Use    Vaping status: Never Used   Substance Use Topics    Alcohol use: Not Currently     Comment: none     Drug use: No       Reviewed and updated as needed this visit by Provider   Tobacco  Allergies  Meds  Problems  Med Hx  Surg Hx  Fam Hx            Past Medical History:   Diagnosis Date    Irritable bowel syndrome     Rosacea      Past Surgical History:   Procedure Laterality Date    HC REPAIR ING HERNIA,5+Y/O,REDUCIBL  2007    right    ZZHC HEMORRHOIDECT EXTERNAL, COMPLETE  1962     Lab work is in process  Labs reviewed in EPIC  BP Readings from Last 3 Encounters:   12/02/24 118/78   07/29/24 130/78   03/12/24 (!) 130/90    Wt Readings from Last 3 Encounters:   12/02/24 67.4 kg (148 lb 8 oz)   07/29/24 66.5 kg (146 lb 8 oz)   03/12/24 65.8 kg (145 lb)                  Current providers sharing in care for this patient include:  Patient Care Team:  Sarah Hickey PA-C as PCP - General (Physician Assistant -  "Medical)  Sarah Hickey PA-C as Assigned PCP  Sarah Hickey PA-C as Assigned Pain Medication Provider    The following health maintenance items are reviewed in Epic and correct as of today:  Health Maintenance   Topic Date Due    HEPATITIS A IMMUNIZATION (1 of 2 - Risk 2-dose series) Never done    ZOSTER IMMUNIZATION (1 of 2) Never done    CONTROLLED SUBSTANCE AGREEMENT FOR CHRONIC PAIN MANAGEMENT  11/02/2024    URINE DRUG SCREEN  11/13/2024    MEDICARE ANNUAL WELLNESS VISIT  12/02/2025    ABHIJEET ASSESSMENT  12/02/2025    ANNUAL REVIEW OF HM ORDERS  12/02/2025    FALL RISK ASSESSMENT  12/02/2025    PHQ-9  12/02/2025    DTAP/TDAP/TD IMMUNIZATION (2 - Td or Tdap) 11/16/2026    ADVANCE CARE PLANNING  11/09/2027    PHQ-2 (once per calendar year)  Completed    INFLUENZA VACCINE  Completed    Pneumococcal Vaccine: 65+ Years  Completed    RSV VACCINE  Completed    COVID-19 Vaccine  Completed    HPV IMMUNIZATION  Aged Out    MENINGITIS IMMUNIZATION  Aged Out    RSV MONOCLONAL ANTIBODY  Aged Out         Review of Systems  Constitutional, neuro, ENT, endocrine, pulmonary, cardiac, gastrointestinal, genitourinary, musculoskeletal, integument and psychiatric systems are negative, except as otherwise noted.     Objective    Exam  /78   Pulse 80   Temp 98.8  F (37.1  C) (Temporal)   Resp 22   Ht 1.66 m (5' 5.35\")   Wt 67.4 kg (148 lb 8 oz)   SpO2 99%   BMI 24.44 kg/m     Estimated body mass index is 24.44 kg/m  as calculated from the following:    Height as of this encounter: 1.66 m (5' 5.35\").    Weight as of this encounter: 67.4 kg (148 lb 8 oz).    Physical Exam  GENERAL: alert and no distress  EYES: Eyes grossly normal to inspection, PERRL and conjunctivae and sclerae normal  HENT: ear canals and TM's normal, nose and mouth without ulcers or lesions  NECK: no adenopathy, no asymmetry, masses, or scars  RESP: lungs clear to auscultation - no rales, rhonchi or wheezes  CV: regular " rate and rhythm, normal S1 S2, no S3 or S4, no murmur, click or rub, no peripheral edema  ABDOMEN: soft, nontender, no hepatosplenomegaly, no masses and bowel sounds normal  MS: no gross musculoskeletal defects noted, no edema  SKIN: no suspicious lesions or rashes  NEURO: Normal strength and tone, mentation intact and speech normal  PSYCH: mentation appears normal, affect normal/bright      Diagnostics: reviewed in Epic, see orders pending in [facility]         12/2/2024   Mini Cog   Clock Draw Score 2 Normal   3 Item Recall 2 objects recalled   Mini Cog Total Score 4            Signed Electronically by: Sarah Hickey PA-C

## 2024-12-02 NOTE — PATIENT INSTRUCTIONS
Patient Education       - Schedule fasting lab appointment     Preventive Care Advice   This is general advice given by our system to help you stay healthy. However, your care team may have specific advice just for you. Please talk to your care team about your preventive care needs.  Nutrition  Eat 5 or more servings of fruits and vegetables each day.  Try wheat bread, brown rice and whole grain pasta (instead of white bread, rice, and pasta).  Get enough calcium and vitamin D. Check the label on foods and aim for 100% of the RDA (recommended daily allowance).  Lifestyle  Exercise at least 150 minutes each week  (30 minutes a day, 5 days a week).  Do muscle strengthening activities 2 days a week. These help control your weight and prevent disease.  No smoking.  Wear sunscreen to prevent skin cancer.  Have a dental exam and cleaning every 6 months.  Yearly exams  See your health care team every year to talk about:  Any changes in your health.  Any medicines your care team has prescribed.  Preventive care, family planning, and ways to prevent chronic diseases.  Shots (vaccines)   HPV shots (up to age 26), if you've never had them before.  Hepatitis B shots (up to age 59), if you've never had them before.  COVID-19 shot: Get this shot when it's due.  Flu shot: Get a flu shot every year.  Tetanus shot: Get a tetanus shot every 10 years.  Pneumococcal, hepatitis A, and RSV shots: Ask your care team if you need these based on your risk.  Shingles shot (for age 50 and up)  General health tests  Diabetes screening:  Starting at age 35, Get screened for diabetes at least every 3 years.  If you are younger than age 35, ask your care team if you should be screened for diabetes.  Cholesterol test: At age 39, start having a cholesterol test every 5 years, or more often if advised.  Bone density scan (DEXA): At age 50, ask your care team if you should have this scan for osteoporosis (brittle bones).  Hepatitis C: Get tested at  least once in your life.  STIs (sexually transmitted infections)  Before age 24: Ask your care team if you should be screened for STIs.  After age 24: Get screened for STIs if you're at risk. You are at risk for STIs (including HIV) if:  You are sexually active with more than one person.  You don't use condoms every time.  You or a partner was diagnosed with a sexually transmitted infection.  If you are at risk for HIV, ask about PrEP medicine to prevent HIV.  Get tested for HIV at least once in your life, whether you are at risk for HIV or not.  Cancer screening tests  Cervical cancer screening: If you have a cervix, begin getting regular cervical cancer screening tests starting at age 21.  Breast cancer scan (mammogram): If you've ever had breasts, begin having regular mammograms starting at age 40. This is a scan to check for breast cancer.  Colon cancer screening: It is important to start screening for colon cancer at age 45.  Have a colonoscopy test every 10 years (or more often if you're at risk) Or, ask your provider about stool tests like a FIT test every year or Cologuard test every 3 years.  To learn more about your testing options, visit:   .  For help making a decision, visit:   https://bit.ly/lc13437.  Prostate cancer screening test: If you have a prostate, ask your care team if a prostate cancer screening test (PSA) at age 55 is right for you.  Lung cancer screening: If you are a current or former smoker ages 50 to 80, ask your care team if ongoing lung cancer screenings are right for you.  For informational purposes only. Not to replace the advice of your health care provider. Copyright   2023 [facility]. All rights reserved. Clinically reviewed by the [facility]. I Had Cancer 350137 - REV 01/24.  Learning About Activities of Daily Living  What are activities of daily living?     Activities of daily living (ADLs) are the basic self-care tasks you do every day.  These include eating, bathing, dressing, and moving around.  As you age, and if you have health problems, you may find that it's harder to do some of these tasks. If so, your doctor can suggest ideas that may help.  To measure what kind of help you may need, your doctor will ask how well you are able to do ADLs. Let your doctor know if there are any tasks that you are having trouble doing. This is an important first step to getting help. And when you have the help you need, you can stay as independent as possible.  How will a doctor assess your ADLs?  Asking about ADLs is part of a routine health checkup your doctor will likely do as you age. Your health check might be done in a doctor's office, in your home, or at a hospital. The goal is to find out if you are having any problems that could make it hard to care for yourself or that make it unsafe for you to be on your own.  To measure your ADLs, your doctor will ask how hard it is for you to do routine tasks. Your doctor may also want to know if you have changed the way you do a task because of a health problem. Your doctor may watch how you:  Walk back and forth.  Keep your balance while you stand or walk.  Move from sitting to standing or from a bed to a chair.  Button or unbutton a shirt or sweater.  Remove and put on your shoes.  It's common to feel a little worried or anxious if you find you can't do all the things you used to be able to do. Talking with your doctor about ADLs is a way to make sure you're as safe as possible and able to care for yourself as well as you can. You may want to bring a caregiver, friend, or family member to your checkup. They can help you talk to your doctor.  Follow-up care is a key part of your treatment and safety. Be sure to make and go to all appointments, and call your doctor if you are having problems. It's also a good idea to know your test results and keep a list of the medicines you take.  Current as of: October 24,  2023  Content Version: 14.2 2024 [facility] [employer].   Care instructions adapted under license by your healthcare professional. If you have questions about a medical condition or this instruction, always ask your healthcare professional. Healthwise, Incorporated disclaims any warranty or liability for your use of this information.    Preventing Falls: Care Instructions  Injuries and health problems such as trouble walking or poor eyesight can increase your risk of falling. So can some medicines. But there are things you can do to help prevent falls. You can exercise to get stronger. You can also arrange your home to make it safer.    Talk to your doctor about the medicines you take. Ask if any of them increase the risk of falls and whether they can be changed or stopped.   Try to exercise regularly. It can help improve your strength and balance. This can help lower your risk of falling.         Practice fall safety and prevention.   Wear low-heeled shoes that fit well and give your feet good support. Talk to your doctor if you have foot problems that make this hard.  Carry a cellphone or wear a medical alert device that you can use to call for help.  Use stepladders instead of chairs to reach high objects. Don't climb if you're at risk for falls. Ask for help, if needed.  Wear the correct eyeglasses, if you need them.        Make your home safer.   Remove rugs, cords, clutter, and furniture from walkways.  Keep your house well lit. Use night-lights in hallways and bathrooms.  Install and use sturdy handrails on stairways.  Wear nonskid footwear, even inside. Don't walk barefoot or in socks without shoes.        Be safe outside.   Use handrails, curb cuts, and ramps whenever possible.  Keep your hands free by using a shoulder bag or backpack.  Try to walk in well-lit areas. Watch out for uneven ground, changes in pavement, and debris.  Be careful in the winter. Walk on the grass or gravel when sidewalks are  "slippery. Use de-icer on steps and walkways. Add non-slip devices to shoes.    Put grab bars and nonskid mats in your shower or tub and near the toilet. Try to use a shower chair or bath bench when bathing.   Get into a tub or shower by putting in your weaker leg first. Get out with your strong side first. Have a phone or medical alert device in the bathroom with you.   Where can you learn more?  Go to https://www.[employer].net/patiented  Enter G117 in the search box to learn more about \"Preventing Falls: Care Instructions.\"  Current as of: July 17, 2023  Content Version: 14.2 2024 [employer].   Care instructions adapted under license by your healthcare professional. If you have questions about a medical condition or this instruction, always ask your healthcare professional. Healthwise, Incorporated disclaims any warranty or liability for your use of this information.    Hearing Loss: Care Instructions  Overview     Hearing loss is a sudden or slow decrease in how well you hear. It can range from slight to profound. Permanent hearing loss can occur with aging. It also can happen when you are exposed long-term to loud noise. Examples include listening to loud music, riding motorcycles, or being around other loud machines.  Hearing loss can affect your work and home life. It can make you feel lonely or depressed. You may feel that you have lost your independence. But hearing aids and other devices can help you hear better and feel connected to others.  Follow-up care is a key part of your treatment and safety. Be sure to make and go to all appointments, and call your doctor if you are having problems. It's also a good idea to know your test results and keep a list of the medicines you take.  How can you care for yourself at home?  Avoid loud noises whenever possible. This helps keep your hearing from getting worse.  Always wear hearing protection around loud noises.  Wear a hearing aid as directed.  A " "professional can help you pick a hearing aid that will work best for you.  You can also get hearing aids over the counter for mild to moderate hearing loss.  Have hearing tests as your doctor suggests. They can show whether your hearing has changed. Your hearing aid may need to be adjusted.  Use other devices as needed. These may include:  Telephone amplifiers and hearing aids that can connect to a television, stereo, radio, or microphone.  Devices that use lights or vibrations. These alert you to the doorbell, a ringing telephone, or a baby monitor.  Television closed-captioning. This shows the words at the bottom of the screen. Most new TVs can do this.  TTY (text telephone). This lets you type messages back and forth on the telephone instead of talking or listening. These devices are also called TDD. When messages are typed on the keyboard, they are sent over the phone line to a receiving TTY. The message is shown on a monitor.  Use text messaging, social media, and email if it is hard for you to communicate by telephone.  Try to learn a listening technique called speechreading. It is not lipreading. You pay attention to people's gestures, expressions, posture, and tone of voice. These clues can help you understand what a person is saying. Face the person you are talking to, and have them face you. Make sure the lighting is good. You need to see the other person's face clearly.  Think about counseling if you need help to adjust to your hearing loss.  When should you call for help?  Watch closely for changes in your health, and be sure to contact your doctor if:    You think your hearing is getting worse.     You have new symptoms, such as dizziness or nausea.   Where can you learn more?  Go to https://www.[employer].net/patiented  Enter R798 in the search box to learn more about \"Hearing Loss: Care Instructions.\"  Current as of: September 27, 2023  Content Version: 14.2 2024 [facility] [employer].   Care " instructions adapted under license by your healthcare professional. If you have questions about a medical condition or this instruction, always ask your healthcare professional. [employer] disclaims any warranty or liability for your use of this information.    Bladder Training: Care Instructions  Your Care Instructions     Bladder training is used to treat urge incontinence and stress incontinence. Urge incontinence means that the need to urinate comes on so fast that you can't get to a toilet in time. Stress incontinence means that you leak urine because of pressure on your bladder. For example, it may happen when you laugh, cough, or lift something heavy.  Bladder training can increase how long you can wait before you have to urinate. It can also help your bladder hold more urine. And it can give you better control over the urge to urinate.  It is important to remember that bladder training takes a few weeks to a few months to make a difference. You may not see results right away, but don't give up.  Follow-up care is a key part of your treatment and safety. Be sure to make and go to all appointments, and call your doctor if you are having problems. It's also a good idea to know your test results and keep a list of the medicines you take.  How can you care for yourself at home?  Work with your doctor to come up with a bladder training program that is right for you. You may use one or more of the following methods.  Delayed urination  In the beginning, try to keep from urinating for 5 minutes after you first feel the need to go.  While you wait, take deep, slow breaths to relax. Kegel exercises can also help you delay the need to go to the bathroom.  After some practice, when you can easily wait 5 minutes to urinate, try to wait 10 minutes before you urinate.  Slowly increase the waiting period until you are able to control when you have to urinate.  Scheduled urination  Empty your bladder when you  "first wake up in the morning.  Schedule times throughout the day when you will urinate.  Start by going to the bathroom every hour, even if you don't need to go.  Slowly increase the time between trips to the bathroom.  When you have found a schedule that works well for you, keep doing it.  If you wake up during the night and have to urinate, do it. Apply your schedule to waking hours only.  Kegel exercises  These tighten and strengthen pelvic muscles, which can help you control the flow of urine. (If doing these exercises causes pain, stop doing them and talk with your doctor.) To do Kegel exercises:  Squeeze your muscles as if you were trying not to pass gas. Or squeeze your muscles as if you were stopping the flow of urine. Your belly, legs, and buttocks shouldn't move.  Hold the squeeze for 3 seconds, then relax for 5 to 10 seconds.  Start with 3 seconds, then add 1 second each week until you are able to squeeze for 10 seconds.  Repeat the exercise 10 times a session. Do 3 to 8 sessions a day.  When should you call for help?  Watch closely for changes in your health, and be sure to contact your doctor if:    Your incontinence is getting worse.     You do not get better as expected.   Where can you learn more?  Go to https://www.[employer].net/patiented  Enter V684 in the search box to learn more about \"Bladder Training: Care Instructions.\"  Current as of: November 15, 2023  Content Version: 14.2 2024 [facility] [employer].   Care instructions adapted under license by your healthcare professional. If you have questions about a medical condition or this instruction, always ask your healthcare professional. Healthwise, Incorporated disclaims any warranty or liability for your use of this information.    Learning About Depression Screening  What is depression screening?  Depression screening is a way to see if you have depression symptoms. It may be done by a doctor or counselor. It's often part of a routine " "checkup. That's because your mental health is just as important as your physical health.  Depression is a mental health condition that affects how you feel, think, and act. You may:  Have less energy.  Lose interest in your daily activities.  Feel sad and grouchy for a long time.  Depression is very common. It affects people of all ages.  Many things can lead to depression. Some people become depressed after they have a stroke or find out they have a major illness like cancer or heart disease. The death of a loved one or a breakup may lead to depression. It can run in families. Most experts believe that a combination of inherited genes and stressful life events can cause it.  What happens during screening?  You may be asked to fill out a form about your depression symptoms. You and the doctor will discuss your answers. The doctor may ask you more questions to learn more about how you think, act, and feel.  What happens after screening?  If you have symptoms of depression, your doctor will talk to you about your options.  Doctors usually treat depression with medicines or counseling. Often, combining the two works best. Many people don't get help because they think that they'll get over the depression on their own. But people with depression may not get better unless they get treatment.  The cause of depression is not well understood. There may be many factors involved. But if you have depression, it's not your fault.  A serious symptom of depression is thinking about death or suicide. If you or someone you care about talks about this or about feeling hopeless, get help right away.  It's important to know that depression can be treated. Medicine, counseling, and self-care may help.  Where can you learn more?  Go to https://www.[employer].net/patiented  Enter T185 in the search box to learn more about \"Learning About Depression Screening.\"  Current as of: June 24, 2023  Content Version: 14.2 2024 [facility].   Care instructions adapted under license by your healthcare professional. If you have questions about a medical condition or this instruction, always ask your healthcare professional. [employer] disclaims any warranty or liability for your use of this information.    Chronic Pain: Care Instructions  Your Care Instructions     Chronic pain is pain that lasts a long time (months or even years) and may or may not have a clear cause. It is different from acute pain, which usually does have a clear cause--like an injury or illness--and gets better over time. Chronic pain:  Lasts over time but may vary from day to day.  Does not go away despite efforts to end it.  May disrupt your sleep and lead to fatigue.  May cause depression or anxiety.  May make your muscles tense, causing more pain.  Can disrupt your work, hobbies, home life, and relationships with friends and family.  Chronic pain is a very real condition. It is not just in your head. Treatment can help and usually includes several methods used together, such as medicines, physical therapy, exercise, and other treatments. Learning how to relax and changing negative thought patterns can also help you cope.  Chronic pain is complex. Taking an active role in your treatment will help you better manage your pain. Tell your doctor if you have trouble dealing with your pain. You may have to try several things before you find what works best for you.  Follow-up care is a key part of your treatment and safety. Be sure to make and go to all appointments, and call your doctor if you are having problems. It's also a good idea to know your test results and keep a list of the medicines you take.  How can you care for yourself at home?  Pace yourself. Break up large jobs into smaller tasks. Save harder tasks for days when you have less pain, or go back and forth between hard tasks and easier ones. Take rest breaks.  Relax, and reduce stress. Relaxation  techniques such as deep breathing or meditation can help.  Keep moving. Gentle, daily exercise can help reduce pain over the long run. Try low- or no-impact exercises such as walking, swimming, and stationary biking. Do stretches to stay flexible.  Try heat, cold packs, and massage.  Get enough sleep. Chronic pain can make you tired and drain your energy. Talk with your doctor if you have trouble sleeping because of pain.  Think positive. Your thoughts can affect your pain level. Do things that you enjoy to distract yourself when you have pain instead of focusing on the pain. See a movie, read a book, listen to music, or spend time with a friend.  If you think you are depressed, talk to your doctor about treatment.  Keep a daily pain diary. Record how your moods, thoughts, sleep patterns, activities, and medicine affect your pain. You may find that your pain is worse during or after certain activities or when you are feeling a certain emotion. Having a record of your pain can help you and your doctor find the best ways to treat your pain.  Take pain medicines exactly as directed.  If the doctor gave you a prescription medicine for pain, take it as prescribed.  If you are not taking a prescription pain medicine, ask your doctor if you can take an over-the-counter medicine.  Reducing constipation caused by pain medicine  Talk to your doctor about a laxative. If a laxative doesn't work, your doctor may suggest a prescription medicine.  Include fruits, vegetables, beans, and whole grains in your diet each day. These foods are high in fiber.  If your doctor recommends it, get more exercise. Walking is a good choice. Bit by bit, increase the amount you walk every day. Try for at least 30 minutes on most days of the week.  Schedule time each day for a bowel movement. A daily routine may help. Take your time and do not strain when having a bowel movement.  When should you call for help?   Call your doctor now or seek  "immediate medical care if:    Your pain gets worse or is out of control.     You feel down or blue, or you do not enjoy things like you once did. You may be depressed, which is common in people with chronic pain. Depression can be treated.     You have vomiting or cramps for more than 2 hours.   Watch closely for changes in your health, and be sure to contact your doctor if:    You cannot sleep because of pain.     You are very worried or anxious about your pain.     You have trouble taking your pain medicine.     You have any concerns about your pain medicine.     You have trouble with bowel movements, such as:  No bowel movement in 3 days.  Blood in the anal area, in your stool, or on the toilet paper.  Diarrhea for more than 24 hours.   Where can you learn more?  Go to https://www.[employer].net/patiented  Enter N004 in the search box to learn more about \"Chronic Pain: Care Instructions.\"  Current as of: July 10, 2023  Content Version: 14.2 2024 [facility] [employer].   Care instructions adapted under license by your healthcare professional. If you have questions about a medical condition or this instruction, always ask your healthcare professional. Healthwise, Incorporated disclaims any warranty or liability for your use of this information.       "

## 2024-12-02 NOTE — LETTER

## 2025-01-15 DIAGNOSIS — G89.29 CHRONIC BILATERAL LOW BACK PAIN WITHOUT SCIATICA: ICD-10-CM

## 2025-01-15 DIAGNOSIS — M54.50 CHRONIC BILATERAL LOW BACK PAIN WITHOUT SCIATICA: ICD-10-CM

## 2025-01-15 RX ORDER — HYDROCODONE BITARTRATE AND ACETAMINOPHEN 5; 325 MG/1; MG/1
1-2 TABLET ORAL EVERY 8 HOURS PRN
Qty: 90 TABLET | Refills: 0 | Status: SHIPPED | OUTPATIENT
Start: 2025-01-15

## 2025-01-15 NOTE — TELEPHONE ENCOUNTER
reviewed. Due for routine fill. E-prescribed.     Ruddy Hickey PA-C  [facility]

## 2025-01-15 NOTE — TELEPHONE ENCOUNTER
Medication Question or Refill    Contacts       Contact Date/Time Type Contact Phone/Fax    01/15/2025 11:24 AM CST Phone (Incoming) Luther Kam (Self) [phone] (H)            What medication are you calling about (include dose and sig)?: HYDROcodone-acetaminophen     Preferred Pharmacy:       [id number] - [place], [place] - [address] AT [facility] [address]  [address] [facility] [address]  Phone: [phone] Fax: [phone]      Controlled Substance Agreement on file:   CSA -- Patient Level:     [Media Unavailable] Controlled Substance Agreement - Opioid - Scan on 12/2/2024  3:36 PM   [Media Unavailable] Controlled Substance Agreement - Opioid - Scan on 11/2/2023  9:42 AM   [Media Unavailable] Controlled Substance Agreement - Opioid - Scan on 4/18/2021  4:19 PM   [Media Unavailable] Controlled Substance Agreement - Opioid - Scan on 8/5/2019  3:04 PM: Opiod/Opiod Plus Controlled Substance Agreement 08/05/19       Who prescribed the medication?: Angely    Do you need a refill? Yes    When did you use the medication last? 01/14/25    Patient offered an appointment? No    Do you have any questions or concerns?  Yes: Need a refill, pt is hard of hearing, does not want to do phone tag.       Okay to leave a detailed message?: Yes at Cell number on file:  Hard of hearing  No relevant phone numbers on file.

## 2025-02-25 ENCOUNTER — LAB (OUTPATIENT)
Dept: LAB | Facility: OTHER | Age: 89
End: 2025-02-25
Payer: MEDICARE

## 2025-02-25 DIAGNOSIS — Z13.220 SCREENING FOR LIPID DISORDERS: ICD-10-CM

## 2025-02-25 DIAGNOSIS — G61.82 MULTIFOCAL MOTOR NEUROPATHY (H): ICD-10-CM

## 2025-02-25 DIAGNOSIS — F11.20 CONTINUOUS OPIOID DEPENDENCE (H): ICD-10-CM

## 2025-02-25 DIAGNOSIS — D64.9 LOW HEMOGLOBIN: ICD-10-CM

## 2025-02-25 LAB
ALBUMIN SERPL BCG-MCNC: 4.2 G/DL (ref 3.5–5.2)
ALP SERPL-CCNC: 63 U/L (ref 40–150)
ALT SERPL W P-5'-P-CCNC: 16 U/L (ref 0–70)
ANION GAP SERPL CALCULATED.3IONS-SCNC: 14 MMOL/L (ref 7–15)
AST SERPL W P-5'-P-CCNC: 22 U/L (ref 0–45)
BILIRUB SERPL-MCNC: 0.6 MG/DL
BUN SERPL-MCNC: 21 MG/DL (ref 8–23)
CALCIUM SERPL-MCNC: 9.3 MG/DL (ref 8.8–10.4)
CHLORIDE SERPL-SCNC: 98 MMOL/L (ref 98–107)
CHOLEST SERPL-MCNC: 155 MG/DL
CREAT SERPL-MCNC: 0.76 MG/DL (ref 0.67–1.17)
CREAT UR-MCNC: 59 MG/DL
EGFRCR SERPLBLD CKD-EPI 2021: 86 ML/MIN/1.73M2
ERYTHROCYTE [DISTWIDTH] IN BLOOD BY AUTOMATED COUNT: 14.5 % (ref 10–15)
FASTING STATUS PATIENT QL REPORTED: YES
FASTING STATUS PATIENT QL REPORTED: YES
GLUCOSE SERPL-MCNC: 91 MG/DL (ref 70–99)
HCO3 SERPL-SCNC: 23 MMOL/L (ref 22–29)
HCT VFR BLD AUTO: 36.6 % (ref 40–53)
HDLC SERPL-MCNC: 81 MG/DL
HGB BLD-MCNC: 12 G/DL (ref 13.3–17.7)
LDLC SERPL CALC-MCNC: 66 MG/DL
MCH RBC QN AUTO: 30.5 PG (ref 26.5–33)
MCHC RBC AUTO-ENTMCNC: 32.8 G/DL (ref 31.5–36.5)
MCV RBC AUTO: 93 FL (ref 78–100)
NONHDLC SERPL-MCNC: 74 MG/DL
PLATELET # BLD AUTO: 217 10E3/UL (ref 150–450)
POTASSIUM SERPL-SCNC: 4.3 MMOL/L (ref 3.4–5.3)
PROT SERPL-MCNC: 6.9 G/DL (ref 6.4–8.3)
RBC # BLD AUTO: 3.93 10E6/UL (ref 4.4–5.9)
SODIUM SERPL-SCNC: 135 MMOL/L (ref 135–145)
TRIGL SERPL-MCNC: 42 MG/DL
WBC # BLD AUTO: 4.7 10E3/UL (ref 4–11)

## 2025-02-25 PROCEDURE — 36415 COLL VENOUS BLD VENIPUNCTURE: CPT

## 2025-02-25 PROCEDURE — 80061 LIPID PANEL: CPT

## 2025-02-25 PROCEDURE — 85027 COMPLETE CBC AUTOMATED: CPT

## 2025-02-25 PROCEDURE — 80053 COMPREHEN METABOLIC PANEL: CPT

## 2025-02-25 PROCEDURE — G0482 DRUG TEST DEF 15-21 CLASSES: HCPCS

## 2025-02-25 NOTE — LETTER
February 27, 2025      Luther Mitchell [place] [facility] [address]                Dear ,    We are writing to inform you of your test results.    Your test results fall within the expected range(s) or remain unchanged from previous results.  Please continue with current treatment plan.          Resulted Orders   CBC with platelets   Result Value Ref Range    WBC Count 4.7 4.0 - 11.0 10e3/uL    RBC Count 3.93 (L) 4.40 - 5.90 10e6/uL    Hemoglobin 12.0 (L) 13.3 - 17.7 g/dL    Hematocrit 36.6 (L) 40.0 - 53.0 %    MCV 93 78 - 100 fL    MCH 30.5 26.5 - 33.0 pg    MCHC 32.8 31.5 - 36.5 g/dL    RDW 14.5 10.0 - 15.0 %    Platelet Count 217 150 - 450 10e3/uL   Lipid panel reflex to direct LDL Fasting   Result Value Ref Range    Cholesterol 155 <200 mg/dL    Triglycerides 42 <150 mg/dL    Direct Measure HDL 81 >=40 mg/dL    LDL Cholesterol Calculated 66 <100 mg/dL    Non HDL Cholesterol 74 <130 mg/dL    Patient Fasting > 8hrs? Yes     Narrative    Cholesterol  Desirable: < 200 mg/dL  Borderline High: 200 - 239 mg/dL  High: >= 240 mg/dL    Triglycerides  Normal: < 150 mg/dL  Borderline High: 150 - 199 mg/dL  High: 200-499 mg/dL  Very High: >= 500 mg/dL    Direct Measure HDL  Female: >= 50 mg/dL   Male: >= 40 mg/dL    LDL Cholesterol  Desirable: < 100 mg/dL  Above Desirable: 100 - 129 mg/dL   Borderline High: 130 - 159 mg/dL   High:  160 - 189 mg/dL   Very High: >= 190 mg/dL    Non HDL Cholesterol  Desirable: < 130 mg/dL  Above Desirable: 130 - 159 mg/dL  Borderline High: 160 - 189 mg/dL  High: 190 - 219 mg/dL  Very High: >= 220 mg/dL   Comprehensive metabolic panel (BMP + Alb, Alk Phos, ALT, AST, Total. Bili, TP)   Result Value Ref Range    Sodium 135 135 - 145 mmol/L    Potassium 4.3 3.4 - 5.3 mmol/L    Carbon Dioxide (CO2) 23 22 - 29 mmol/L    Anion Gap 14 7 - 15 mmol/L    Urea Nitrogen 21.0 8.0 - 23.0 mg/dL    Creatinine 0.76 0.67 - 1.17 mg/dL    GFR Estimate 86 >60 mL/min/1.73m2      Comment:       eGFR calculated using 2021 CKD-EPI equation.    Calcium 9.3 8.8 - 10.4 mg/dL    Chloride 98 98 - 107 mmol/L    Glucose 91 70 - 99 mg/dL    Alkaline Phosphatase 63 40 - 150 U/L    AST 22 0 - 45 U/L    ALT 16 0 - 70 U/L    Protein Total 6.9 6.4 - 8.3 g/dL    Albumin 4.2 3.5 - 5.2 g/dL    Bilirubin Total 0.6 <=1.2 mg/dL    Patient Fasting > 8hrs? Yes    Urine Drug Confirmation Panel   Result Value Ref Range    Dihydrocodeine ng/mL 122 (H) <50 ng/mL    Dihydrocodeine 207 Absent ng/mg [creat]      Comment:      Hydromorphone and dihydrocodeine are expected metabolites of hydrocodone. Hydromorphone and dihydrocodeine are also available as scheduled prescription medications.    Hydrocodone ng/mL 149 (H) <50 ng/mL    Hydrocodone 253 Absent ng/mg [creat]      Comment:      Sources of hydrocodone include scheduled prescription medications.  Hydromorphone and dihydrocodeine are expected metabolites of hydrocodone. Hydromorphone and dihydrocodeine are also available as scheduled prescription medications.    Hydromorphone ng/mL 86 (H) <50 ng/mL    Hydromorphone 146 Absent ng/mg [creat]      Comment:      Hydromorphone may be present as a metabolite of morphine.  Concentrations of hydromorphone rarely exceed 5% of the morphine concentration when this is the source of hydromorphone.  Hydromorphone and dihydrocodeine are expected metabolites of hydrocodone. Hydromorphone and dihydrocodeine are also available as scheduled prescription medications.    Narrative    This test was developed and its performance characteristics determined by the [facility]  [facility]. It has not been cleared or approved by the FDA. The laboratory is regulated under CLIA as qualified to perform high-complexity testing. This test is used for clinical purposes. It should not be regarded as investigational or for research.  Drugs with concentrations less than the cutoff will not be reported.  The drugs with  applicable detection cutoff limits that are included within the Drug Confirmation Panel are:    The following drugs are detected with a cutoff of 3 ng/ml: FENTANYL    The following drugs are detected with a cutoff of 5 ng/mL: 6-ACETYLMORPHINE, BUPRENORPHINE, NALOXONE, NORBUPRENORPHINE, NORFENTANYL    The following drugs are detected with a cutoff of 10 ng/mL: SUFENTANIL    The following drugs are detected with a cutoff of 20 ng/mL: PCP (PHENCYCLIDINE)    The following drugs are detected with a cutoff of 50 ng/mL: 7-AMINOCLONAZEPAM, 7-AMINOFLUNITRAZEPAM, ALPRAZOLAM, AMPHETAMINE, A-OH-ALPRAZOLAM, A-OH-MIDAZOLAM, A-OH-TRIAZOLAM, BENZOYLECGONINE (Cocaine Metabolite), CLONAZEPAM, COCAETHYLENE (Cocaine Metabolite), CODEINE, DIAZEPAM, DIHYDROCODEINE, EDDP (Methadone Metabolite), HYDROCODONE, HYDROMORPHONE, LORAZEPAM, MDA (3,4-Methylenedioxyamphetamine), MDEA (3,4-Kagmyhabzhhxqd-O-ethylcathinone), MDMA (Methylenedioxyamphetamine,Ecstasy), MEPERIDINE, METHADONE, METHAMPHETAMINE, METHYLPHENIDATE (Ritalin), MORPHINE, NALTREXONE, N-DESMETH-TAPENTADOL, NORCODEINE, NORDIAZEPAM, NORMEPERIDINE, O-ALBERT-TRAMADOL, OXAZEPAM, OXYCODONE, OXYMORPHONE, PROPOXYPHENE, RITALINIC ACID, TAPENTADOL, TEMAZEPAM, THEBAINE, TRAMADOL    The following drugs are detected with a cutoff of 100 ng/mL: GABAPENTIN, KETAMINE    The following drugs are detected with a cutoff of 200 ng/mL: PREGABALIN, XYLAZINE    Providers with questions surrounding testing results should contact the Clinical Chemistry service line.   Urine Creatinine for Drug Screen Panel   Result Value Ref Range    Creatinine Urine for Drug Screen 59 mg/dL      Comment:      The reference range has not been established for creatinine in random urines. The results should be integrated into the clinical context for interpretation.       If you have any questions or concerns, please call the clinic at the number listed above.       Sincerely,      Sarah Hickey  SANYA    Electronically signed

## 2025-02-27 LAB
DHC UR CFM-MCNC: 122 NG/ML
DHC/CREAT UR: 207 NG/MG {CREAT}
HYDROCODONE UR CFM-MCNC: 149 NG/ML
HYDROCODONE/CREAT UR: 253 NG/MG {CREAT}
HYDROMORPHONE UR CFM-MCNC: 86 NG/ML
HYDROMORPHONE/CREAT UR: 146 NG/MG {CREAT}

## 2025-03-03 DIAGNOSIS — M54.50 CHRONIC BILATERAL LOW BACK PAIN WITHOUT SCIATICA: ICD-10-CM

## 2025-03-03 DIAGNOSIS — G89.29 CHRONIC BILATERAL LOW BACK PAIN WITHOUT SCIATICA: ICD-10-CM

## 2025-03-03 RX ORDER — HYDROCODONE BITARTRATE AND ACETAMINOPHEN 5; 325 MG/1; MG/1
1-2 TABLET ORAL EVERY 8 HOURS PRN
Qty: 90 TABLET | Refills: 0 | Status: SHIPPED | OUTPATIENT
Start: 2025-03-03

## 2025-03-03 NOTE — TELEPHONE ENCOUNTER
reviewed. Due for routine fill. E-prescribed.     Ruddy Hickey PA-C  [facility]

## 2025-03-03 NOTE — TELEPHONE ENCOUNTER
Updated patient that med was filled and sent to his pharmacy. Closing encounter.    Halle Reagan RN on 3/3/2025 at 5:26 PM

## 2025-03-03 NOTE — TELEPHONE ENCOUNTER
Medication Question or Refill        What medication are you calling about (include dose and sig)?: HYDROcodone-acetaminophen (NORCO) 5-325 MG tablet     Preferred Pharmacy:    [id number] - [place], [place] - [address] AT [facility] [address]  [address]  [facility] [address]  Phone: [phone] Fax: [phone]      Controlled Substance Agreement on file:   CSA -- Patient Level:     [Media Unavailable] Controlled Substance Agreement - Opioid - Scan on 12/2/2024  3:36 PM   [Media Unavailable] Controlled Substance Agreement - Opioid - Scan on 11/2/2023  9:42 AM   [Media Unavailable] Controlled Substance Agreement - Opioid - Scan on 4/18/2021  4:19 PM   [Media Unavailable] Controlled Substance Agreement - Opioid - Scan on 8/5/2019  3:04 PM: Opiod/Opiod Plus Controlled Substance Agreement 08/05/19       Who prescribed the medication?: Sarah Hickey PA-C    Do you need a refill? Yes      Do you have any questions or concerns?  No      Okay to leave a detailed message?: Yes at Home number on file [phone] (home)

## 2025-04-09 ENCOUNTER — TELEPHONE (OUTPATIENT)
Dept: FAMILY MEDICINE | Facility: OTHER | Age: 89
End: 2025-04-09
Payer: MEDICARE

## 2025-04-09 DIAGNOSIS — G89.29 CHRONIC BILATERAL LOW BACK PAIN WITHOUT SCIATICA: ICD-10-CM

## 2025-04-09 DIAGNOSIS — M54.50 CHRONIC BILATERAL LOW BACK PAIN WITHOUT SCIATICA: ICD-10-CM

## 2025-04-09 RX ORDER — HYDROCODONE BITARTRATE AND ACETAMINOPHEN 5; 325 MG/1; MG/1
1-2 TABLET ORAL EVERY 8 HOURS PRN
Qty: 90 TABLET | Refills: 0 | Status: SHIPPED | OUTPATIENT
Start: 2025-04-09

## 2025-04-09 NOTE — TELEPHONE ENCOUNTER
Patient calling to get his refill. He said he will stop into the clinic because the pharmacy never seems to have his refill when he requests it. Advised him I will send a message to provider to get this started. He does not need a call back.

## 2025-04-09 NOTE — TELEPHONE ENCOUNTER
reviewed. Due for routine fill. E-prescribed.     Ruddy Hickey PA-C  [facility]

## 2025-05-19 DIAGNOSIS — M54.50 CHRONIC BILATERAL LOW BACK PAIN WITHOUT SCIATICA: ICD-10-CM

## 2025-05-19 DIAGNOSIS — G89.29 CHRONIC BILATERAL LOW BACK PAIN WITHOUT SCIATICA: ICD-10-CM

## 2025-05-19 RX ORDER — HYDROCODONE BITARTRATE AND ACETAMINOPHEN 5; 325 MG/1; MG/1
1-2 TABLET ORAL EVERY 8 HOURS PRN
Qty: 90 TABLET | Refills: 0 | Status: SHIPPED | OUTPATIENT
Start: 2025-05-19

## 2025-05-19 NOTE — TELEPHONE ENCOUNTER
Patient presents to the clinic asking about refill request for Hydrocodone. Will send request to provider.

## 2025-06-24 DIAGNOSIS — G89.29 CHRONIC BILATERAL LOW BACK PAIN WITHOUT SCIATICA: ICD-10-CM

## 2025-06-24 DIAGNOSIS — M54.50 CHRONIC BILATERAL LOW BACK PAIN WITHOUT SCIATICA: ICD-10-CM

## 2025-06-24 RX ORDER — HYDROCODONE BITARTRATE AND ACETAMINOPHEN 5; 325 MG/1; MG/1
1-2 TABLET ORAL EVERY 8 HOURS PRN
Qty: 90 TABLET | Refills: 0 | Status: SHIPPED | OUTPATIENT
Start: 2025-06-24

## 2025-06-24 NOTE — TELEPHONE ENCOUNTER
reviewed. Due for routine fill. E-prescribed.     Ruddy Hickey PA-C  [facility]

## 2025-06-24 NOTE — TELEPHONE ENCOUNTER
Patient stopped in at the clinic requesting a refill on his Norco.     Last refill: 5/19/25  Max 4/day  # 90      Jocelyn Slater MA on 6/24/2025 at 1:50 PM

## 2025-07-29 ENCOUNTER — TRANSFERRED RECORDS (OUTPATIENT)
Dept: HEALTH INFORMATION MANAGEMENT | Facility: CLINIC | Age: 89
End: 2025-07-29
Payer: MEDICARE

## 2025-07-29 DIAGNOSIS — M54.50 CHRONIC BILATERAL LOW BACK PAIN WITHOUT SCIATICA: ICD-10-CM

## 2025-07-29 DIAGNOSIS — G89.29 CHRONIC BILATERAL LOW BACK PAIN WITHOUT SCIATICA: ICD-10-CM

## 2025-07-29 RX ORDER — HYDROCODONE BITARTRATE AND ACETAMINOPHEN 5; 325 MG/1; MG/1
1-2 TABLET ORAL EVERY 8 HOURS PRN
Qty: 90 TABLET | Refills: 0 | Status: SHIPPED | OUTPATIENT
Start: 2025-07-29

## 2025-07-29 NOTE — TELEPHONE ENCOUNTER
reviewed. Due for routine fill. E-prescribed.     Ruddy Hickey PA-C  [facility]

## 2025-07-29 NOTE — TELEPHONE ENCOUNTER
Patient has a few days of medication left, calling for a refill.    He is very hard of hearing and requested that we send the Rx directly to his provider so that no one needs to call him back.    Nuria Oakley XRO/

## 2025-09-02 DIAGNOSIS — G89.29 CHRONIC BILATERAL LOW BACK PAIN WITHOUT SCIATICA: ICD-10-CM

## 2025-09-02 DIAGNOSIS — M54.50 CHRONIC BILATERAL LOW BACK PAIN WITHOUT SCIATICA: ICD-10-CM

## 2025-09-02 RX ORDER — HYDROCODONE BITARTRATE AND ACETAMINOPHEN 5; 325 MG/1; MG/1
1-2 TABLET ORAL EVERY 8 HOURS PRN
Qty: 90 TABLET | Refills: 0 | Status: SHIPPED | OUTPATIENT
Start: 2025-09-02